# Patient Record
Sex: FEMALE | Race: BLACK OR AFRICAN AMERICAN | NOT HISPANIC OR LATINO | Employment: OTHER | URBAN - METROPOLITAN AREA
[De-identification: names, ages, dates, MRNs, and addresses within clinical notes are randomized per-mention and may not be internally consistent; named-entity substitution may affect disease eponyms.]

---

## 2019-04-10 ENCOUNTER — HOSPITAL ENCOUNTER (INPATIENT)
Facility: HOSPITAL | Age: 74
LOS: 6 days | DRG: 464 | End: 2019-04-16
Attending: EMERGENCY MEDICINE | Admitting: EMERGENCY MEDICINE
Payer: MEDICARE

## 2019-04-10 DIAGNOSIS — S91.302A OPEN WOUND OF LEFT HEEL, INITIAL ENCOUNTER: ICD-10-CM

## 2019-04-10 DIAGNOSIS — S91.309A WOUND OF FOOT: ICD-10-CM

## 2019-04-10 DIAGNOSIS — M86.072 ACUTE HEMATOGENOUS OSTEOMYELITIS OF LEFT FOOT: ICD-10-CM

## 2019-04-10 DIAGNOSIS — R09.89 DECREASED PEDAL PULSES: ICD-10-CM

## 2019-04-10 DIAGNOSIS — L89.90 PRESSURE INJURY OF SKIN, UNSPECIFIED INJURY STAGE, UNSPECIFIED LOCATION: Primary | ICD-10-CM

## 2019-04-10 LAB
ALBUMIN SERPL BCP-MCNC: 2.9 G/DL (ref 3.5–5.2)
ALP SERPL-CCNC: 76 U/L (ref 55–135)
ALT SERPL W/O P-5'-P-CCNC: 21 U/L (ref 10–44)
ANION GAP SERPL CALC-SCNC: 8 MMOL/L (ref 8–16)
AST SERPL-CCNC: 16 U/L (ref 10–40)
BASOPHILS # BLD AUTO: 0.03 K/UL (ref 0–0.2)
BASOPHILS NFR BLD: 0.3 % (ref 0–1.9)
BILIRUB SERPL-MCNC: 0.2 MG/DL (ref 0.1–1)
BUN SERPL-MCNC: 29 MG/DL (ref 8–23)
CALCIUM SERPL-MCNC: 10.8 MG/DL (ref 8.7–10.5)
CHLORIDE SERPL-SCNC: 112 MMOL/L (ref 95–110)
CO2 SERPL-SCNC: 27 MMOL/L (ref 23–29)
CREAT SERPL-MCNC: 1.7 MG/DL (ref 0.5–1.4)
CRP SERPL-MCNC: 68.1 MG/L (ref 0–8.2)
DIFFERENTIAL METHOD: ABNORMAL
EOSINOPHIL # BLD AUTO: 0.2 K/UL (ref 0–0.5)
EOSINOPHIL NFR BLD: 2.5 % (ref 0–8)
ERYTHROCYTE [DISTWIDTH] IN BLOOD BY AUTOMATED COUNT: 15 % (ref 11.5–14.5)
ERYTHROCYTE [SEDIMENTATION RATE] IN BLOOD BY WESTERGREN METHOD: 120 MM/HR (ref 0–20)
EST. GFR  (AFRICAN AMERICAN): 34 ML/MIN/1.73 M^2
EST. GFR  (NON AFRICAN AMERICAN): 29 ML/MIN/1.73 M^2
GLUCOSE SERPL-MCNC: 98 MG/DL (ref 70–110)
HCT VFR BLD AUTO: 36.6 % (ref 37–48.5)
HGB BLD-MCNC: 10.9 G/DL (ref 12–16)
LYMPHOCYTES # BLD AUTO: 1.8 K/UL (ref 1–4.8)
LYMPHOCYTES NFR BLD: 19.7 % (ref 18–48)
MCH RBC QN AUTO: 26.1 PG (ref 27–31)
MCHC RBC AUTO-ENTMCNC: 29.8 G/DL (ref 32–36)
MCV RBC AUTO: 88 FL (ref 82–98)
MONOCYTES # BLD AUTO: 0.7 K/UL (ref 0.3–1)
MONOCYTES NFR BLD: 7.5 % (ref 4–15)
NEUTROPHILS # BLD AUTO: 6.5 K/UL (ref 1.8–7.7)
NEUTROPHILS NFR BLD: 70 % (ref 38–73)
PLATELET # BLD AUTO: 387 K/UL (ref 150–350)
PMV BLD AUTO: 8.6 FL (ref 9.2–12.9)
POTASSIUM SERPL-SCNC: 4.5 MMOL/L (ref 3.5–5.1)
PROT SERPL-MCNC: 8.4 G/DL (ref 6–8.4)
RBC # BLD AUTO: 4.18 M/UL (ref 4–5.4)
SODIUM SERPL-SCNC: 147 MMOL/L (ref 136–145)
WBC # BLD AUTO: 9.29 K/UL (ref 3.9–12.7)

## 2019-04-10 PROCEDURE — 87077 CULTURE AEROBIC IDENTIFY: CPT

## 2019-04-10 PROCEDURE — 99285 EMERGENCY DEPT VISIT HI MDM: CPT

## 2019-04-10 PROCEDURE — 85652 RBC SED RATE AUTOMATED: CPT

## 2019-04-10 PROCEDURE — 87186 SC STD MICRODIL/AGAR DIL: CPT

## 2019-04-10 PROCEDURE — 80053 COMPREHEN METABOLIC PANEL: CPT

## 2019-04-10 PROCEDURE — 87070 CULTURE OTHR SPECIMN AEROBIC: CPT

## 2019-04-10 PROCEDURE — 11000001 HC ACUTE MED/SURG PRIVATE ROOM

## 2019-04-10 PROCEDURE — 86140 C-REACTIVE PROTEIN: CPT

## 2019-04-10 PROCEDURE — 85025 COMPLETE CBC W/AUTO DIFF WBC: CPT

## 2019-04-10 RX ORDER — DONEPEZIL HYDROCHLORIDE 10 MG/1
10 TABLET, FILM COATED ORAL NIGHTLY
Status: DISCONTINUED | OUTPATIENT
Start: 2019-04-10 | End: 2019-04-16 | Stop reason: HOSPADM

## 2019-04-10 RX ORDER — FLUTICASONE PROPIONATE 50 MCG
1 SPRAY, SUSPENSION (ML) NASAL DAILY
Status: DISCONTINUED | OUTPATIENT
Start: 2019-04-11 | End: 2019-04-11

## 2019-04-10 RX ORDER — CARVEDILOL 6.25 MG/1
6.25 TABLET ORAL 2 TIMES DAILY WITH MEALS
Status: DISCONTINUED | OUTPATIENT
Start: 2019-04-11 | End: 2019-04-16 | Stop reason: HOSPADM

## 2019-04-10 RX ORDER — SODIUM CHLORIDE 0.9 % (FLUSH) 0.9 %
10 SYRINGE (ML) INJECTION
Status: DISCONTINUED | OUTPATIENT
Start: 2019-04-10 | End: 2019-04-11

## 2019-04-10 RX ORDER — ZINC SULFATE 50(220)MG
220 CAPSULE ORAL DAILY
COMMUNITY

## 2019-04-10 RX ORDER — CHOLECALCIFEROL (VITAMIN D3) 25 MCG
1000 TABLET ORAL DAILY
COMMUNITY

## 2019-04-10 RX ORDER — ARIPIPRAZOLE 5 MG/1
5 TABLET ORAL DAILY
Status: DISCONTINUED | OUTPATIENT
Start: 2019-04-11 | End: 2019-04-16 | Stop reason: HOSPADM

## 2019-04-10 RX ORDER — ACETAMINOPHEN 325 MG/1
650 TABLET ORAL EVERY 8 HOURS PRN
Status: DISCONTINUED | OUTPATIENT
Start: 2019-04-10 | End: 2019-04-11

## 2019-04-10 RX ORDER — FLUTICASONE PROPIONATE 50 MCG
1 SPRAY, SUSPENSION (ML) NASAL DAILY
COMMUNITY

## 2019-04-10 RX ORDER — ONDANSETRON 2 MG/ML
4 INJECTION INTRAMUSCULAR; INTRAVENOUS EVERY 8 HOURS PRN
Status: DISCONTINUED | OUTPATIENT
Start: 2019-04-10 | End: 2019-04-11

## 2019-04-10 RX ORDER — DONEPEZIL HYDROCHLORIDE 10 MG/1
10 TABLET, FILM COATED ORAL NIGHTLY
COMMUNITY

## 2019-04-10 RX ORDER — TRAZODONE HYDROCHLORIDE 50 MG/1
25 TABLET ORAL NIGHTLY
COMMUNITY

## 2019-04-10 RX ORDER — CARVEDILOL 6.25 MG/1
6.25 TABLET ORAL 2 TIMES DAILY WITH MEALS
COMMUNITY

## 2019-04-10 RX ORDER — ARIPIPRAZOLE 15 MG/1
15 TABLET ORAL NIGHTLY
COMMUNITY

## 2019-04-10 RX ORDER — ESCITALOPRAM OXALATE 10 MG/1
15 TABLET ORAL DAILY
COMMUNITY

## 2019-04-10 NOTE — ED PROVIDER NOTES
Encounter Date: 4/10/2019    SCRIBE #1 NOTE: I, Emeka Bajwa, am scribing for, and in the presence of,  Anu Dunn MD. I have scribed the following portions of the note - Other sections scribed: HPI, ROS.       History     Chief Complaint   Patient presents with    Wound Infection     From [place] with wound to left heel. Per EMS, nursing home wants to rule out osteo.      CC: Wound Infection    HPI: This 74 y.o. Female with history of cognitive disturbance, gait disturbance, GERD, CKD, dementia presents to the ED from [place] for an evaluation of a L heel wound. Her nursing home wanted her to come to the ED to rule out osteomyelitis.  Patient denies any complaints but she is a poor historian.  Pt states her wound is not painful anymore.  History obtained primarily by reviewing the nursing home medical records that were sent with the patient.      The history is provided by the patient. No  was used.     Review of patient's allergies indicates:  No Known Allergies  Past Medical History:   Diagnosis Date    Hypertension      History reviewed. No pertinent surgical history.  History reviewed. No pertinent family history.  Social History     Tobacco Use    Smoking status: Never Smoker   Substance Use Topics    Alcohol use: Not Currently    Drug use: Not on file     Review of Systems   Constitutional: Negative for chills and fever.   Respiratory: Negative for shortness of breath.    Cardiovascular: Negative for chest pain.   Gastrointestinal: Negative for abdominal pain, nausea and vomiting.   Genitourinary: Negative for difficulty urinating.   Musculoskeletal: Negative for back pain and neck pain.   Skin: Positive for wound.   Neurological: Negative for headaches.   Psychiatric/Behavioral: The patient is not nervous/anxious.        Physical Exam     Initial Vitals [04/10/19 1620]   BP Pulse Resp Temp SpO2   (!) 162/89 72 18 98.8 °F (37.1 °C) 100 %      MAP       --         Physical  Exam    Constitutional: She appears well-developed and well-nourished. She is not diaphoretic. No distress.   HENT:   Mouth/Throat: Oropharynx is clear and moist.   Eyes: Pupils are equal, round, and reactive to light.   Neck: Neck supple.   Cardiovascular: Normal rate and regular rhythm.   Pulses:       Dorsalis pedis pulses are 2+ on the right side, and 2+ on the left side.   Pulmonary/Chest: Breath sounds normal.   Abdominal: Soft. Bowel sounds are normal. There is no tenderness.   Neurological: She is alert.   Patient moves all 4 extremities   Skin: Skin is warm.   Approximately 4-5 cm ulceration to the left posterior heel, the tissue within this ulceration appears necrotic and has a foul smell.  There is no surrounding crepitus or swelling. I do not palpate any bone within this tissue.  There is serous fluid draining from this wound.  On the right heel there appears to be a dark fluid filled blister, this area is nontender to palpation, there is no crepitus or surrounding erythema, there is no drainage from this wound.         ED Course   Procedures  Labs Reviewed   CBC W/ AUTO DIFFERENTIAL - Abnormal; Notable for the following components:       Result Value    Hemoglobin 10.9 (*)     Hematocrit 36.6 (*)     MCH 26.1 (*)     MCHC 29.8 (*)     RDW 15.0 (*)     Platelets 387 (*)     MPV 8.6 (*)     All other components within normal limits   COMPREHENSIVE METABOLIC PANEL - Abnormal; Notable for the following components:    Sodium 147 (*)     Chloride 112 (*)     BUN, Bld 29 (*)     Creatinine 1.7 (*)     Calcium 10.8 (*)     Albumin 2.9 (*)     eGFR if  34 (*)     eGFR if non  29 (*)     All other components within normal limits   SEDIMENTATION RATE - Abnormal; Notable for the following components:    Sed Rate 120 (*)     All other components within normal limits   C-REACTIVE PROTEIN - Abnormal; Notable for the following components:    CRP 68.1 (*)     All other components within  normal limits          Imaging Results          MRI Foot (Hindfoot) Left Without Contrast (Final result)  Result time 04/10/19 22:49:34    Final result by Keshav Venegas MD (04/10/19 22:49:34)                 Impression:      Osseous edema within the posterior calcaneus which may be secondary to reactive edema in this patient with posterior heel ulceration, although potential early developing osteomyelitis not excluded.      Electronically signed by: Keshav Venegas MD  Date:    04/10/2019  Time:    22:49             Narrative:    EXAMINATION:  MRI FOOT (HINDFOOT) LEFT WITHOUT CONTRAST    CLINICAL HISTORY:  Soft tissue disorders related to use, overuse and pressure;pressure ulcer L heel, rule out osteomyelitis;    TECHNIQUE:  Multiplanar, multisequence images were obtained of the left hindfoot without the use of IV contrast.    COMPARISON:  Left foot radiographs from the same date.    FINDINGS:  Osseous edema is seen involving the posterior aspect of the calcaneus in the region of posterior soft tissue ulceration.  Mild heterogenous T1 marrow signal is seen in this region.  T1 marrow signal is otherwise preserved throughout the remainder of the hindfoot.  No evidence of fracture.  Achilles tendon is intact.  No focal fluid collections seen.                               X-Ray Foot Complete Left (Final result)  Result time 04/10/19 17:33:42    Final result by Raymundo Peraza MD (04/10/19 17:33:42)                 Impression:      Suspected soft tissue ulceration about the heel without definite underlying acute displaced fracture-dislocation or destructive osseous process seen, noting that radiographic appearance of osteomyelitis can lag clinical presentation up to 2 weeks.  Further evaluation with elective/nonemergent bone scan can be obtained as warranted.      Electronically signed by: Raymundo Peraza MD  Date:    04/10/2019  Time:    17:33             Narrative:    EXAMINATION:  XR FOOT COMPLETE 3 VIEW  LEFT    CLINICAL HISTORY:  .  Unspecified open wound, unspecified foot, initial encounter    TECHNIQUE:  AP, lateral and oblique views of the left foot were performed.    COMPARISON:  None    FINDINGS:  Slight hallux valgus configuration.  Exaggerated flexion at the 2nd through 5th PIP joints with overlapping of osseous structures somewhat limits evaluation on all views.    Skin irregularity with localized soft tissue defect measuring at least 2.5 cm at the heel overlying the calcaneal tuberosity.  No associated subcutaneous gas foci or radiodense retained foreign body.  No subjacent osseous erosion or periosteal reaction.    No displaced fracture, dislocation or destructive osseous process.  Small plantar calcaneal spur.  Mild degenerative change at the 1st MTP joint and dorsal midfoot.                                 Medical Decision Making:   Initial Assessment:   74-year-old female presents from nursing home to rule out osteomyelitis on her left heel.  The wound appears to have necrotic tissue within it and has a foul smell and is draining serous fluid.  The patient does not appear toxic.  Differential includes pressure ulcer versus osteomyelitis, I do not suspect necrotizing fasciitis versus abscess based on my exam.  Workup with labs including inflammatory markers, x-ray.  ED Management:  No leukocytosis, inflammatory markers are elevated, x-ray is nondiagnostic for osteomyelitis.  MRI of her left hindfoot has been ordered, patient to be admitted for MRI and podiatry consult.  Discussed case with Dr. Salcedo.             Scribe Attestation:   Scribe #1: I performed the above scribed service and the documentation accurately describes the services I performed. I attest to the accuracy of the note.    Attending Attestation:           Physician Attestation for Scribe:  Physician Attestation Statement for Scribe #1: I, Anu Dunn MD, reviewed documentation, as scribed by Emeka Bajwa in my presence, and it is  both accurate and complete.                    Clinical Impression:       ICD-10-CM ICD-9-CM   1. Pressure injury of skin, unspecified injury stage, unspecified location L89.90 707.00     707.20   2. Wound of foot S91.309A 892.0                                Anu Dunn MD  04/11/19 6233

## 2019-04-10 NOTE — ED TRIAGE NOTES
Pt to the ED via EMS from [place] with c/o L heel infection. MD would like pt evaluated for infection to bone. No acute distress noted. Pt is awake alert and oriented. VSS.

## 2019-04-11 ENCOUNTER — TELEPHONE (OUTPATIENT)
Dept: PODIATRY | Facility: CLINIC | Age: 74
End: 2019-04-11

## 2019-04-11 PROBLEM — D63.8 ANEMIA OF CHRONIC DISEASE: Chronic | Status: ACTIVE | Noted: 2019-04-11

## 2019-04-11 PROBLEM — F03.918 DEMENTIA WITH BEHAVIORAL DISTURBANCE: Chronic | Status: ACTIVE | Noted: 2019-04-11

## 2019-04-11 PROBLEM — L97.423 CHRONIC HEEL ULCER, LEFT, WITH NECROSIS OF MUSCLE: Status: ACTIVE | Noted: 2019-04-11

## 2019-04-11 PROBLEM — M86.072 ACUTE HEMATOGENOUS OSTEOMYELITIS OF LEFT FOOT: Status: ACTIVE | Noted: 2019-04-11

## 2019-04-11 PROBLEM — I10 ESSENTIAL HYPERTENSION: Chronic | Status: ACTIVE | Noted: 2019-04-11

## 2019-04-11 PROBLEM — F32.A DEPRESSION: Chronic | Status: ACTIVE | Noted: 2019-04-11

## 2019-04-11 PROBLEM — S91.302A OPEN WOUND OF LEFT HEEL: Status: ACTIVE | Noted: 2019-04-10

## 2019-04-11 PROBLEM — N18.30 CKD (CHRONIC KIDNEY DISEASE) STAGE 3, GFR 30-59 ML/MIN: Chronic | Status: ACTIVE | Noted: 2019-04-11

## 2019-04-11 PROBLEM — L97.424: Status: ACTIVE | Noted: 2019-04-11

## 2019-04-11 PROBLEM — K21.9 GERD (GASTROESOPHAGEAL REFLUX DISEASE): Chronic | Status: ACTIVE | Noted: 2019-04-11

## 2019-04-11 LAB
ALBUMIN SERPL BCP-MCNC: 2.6 G/DL (ref 3.5–5.2)
ALP SERPL-CCNC: 66 U/L (ref 55–135)
ALT SERPL W/O P-5'-P-CCNC: 16 U/L (ref 10–44)
ANION GAP SERPL CALC-SCNC: 9 MMOL/L (ref 8–16)
AST SERPL-CCNC: 13 U/L (ref 10–40)
BASOPHILS # BLD AUTO: 0.03 K/UL (ref 0–0.2)
BASOPHILS NFR BLD: 0.3 % (ref 0–1.9)
BILIRUB SERPL-MCNC: 0.3 MG/DL (ref 0.1–1)
BUN SERPL-MCNC: 29 MG/DL (ref 8–23)
CALCIUM SERPL-MCNC: 10.4 MG/DL (ref 8.7–10.5)
CHLORIDE SERPL-SCNC: 113 MMOL/L (ref 95–110)
CO2 SERPL-SCNC: 24 MMOL/L (ref 23–29)
CREAT SERPL-MCNC: 1.6 MG/DL (ref 0.5–1.4)
DIFFERENTIAL METHOD: ABNORMAL
EOSINOPHIL # BLD AUTO: 0.2 K/UL (ref 0–0.5)
EOSINOPHIL NFR BLD: 1.6 % (ref 0–8)
ERYTHROCYTE [DISTWIDTH] IN BLOOD BY AUTOMATED COUNT: 14.8 % (ref 11.5–14.5)
EST. GFR  (AFRICAN AMERICAN): 36 ML/MIN/1.73 M^2
EST. GFR  (NON AFRICAN AMERICAN): 31 ML/MIN/1.73 M^2
GLUCOSE SERPL-MCNC: 127 MG/DL (ref 70–110)
HCT VFR BLD AUTO: 35.1 % (ref 37–48.5)
HGB BLD-MCNC: 10.4 G/DL (ref 12–16)
LYMPHOCYTES # BLD AUTO: 2.5 K/UL (ref 1–4.8)
LYMPHOCYTES NFR BLD: 21.2 % (ref 18–48)
MAGNESIUM SERPL-MCNC: 2.3 MG/DL (ref 1.6–2.6)
MCH RBC QN AUTO: 25.9 PG (ref 27–31)
MCHC RBC AUTO-ENTMCNC: 29.6 G/DL (ref 32–36)
MCV RBC AUTO: 88 FL (ref 82–98)
MONOCYTES # BLD AUTO: 1.1 K/UL (ref 0.3–1)
MONOCYTES NFR BLD: 9.3 % (ref 4–15)
NEUTROPHILS # BLD AUTO: 7.9 K/UL (ref 1.8–7.7)
NEUTROPHILS NFR BLD: 67.6 % (ref 38–73)
PHOSPHATE SERPL-MCNC: 3.8 MG/DL (ref 2.7–4.5)
PLATELET # BLD AUTO: 358 K/UL (ref 150–350)
PMV BLD AUTO: 8.8 FL (ref 9.2–12.9)
POTASSIUM SERPL-SCNC: 4.8 MMOL/L (ref 3.5–5.1)
PROT SERPL-MCNC: 7.4 G/DL (ref 6–8.4)
RBC # BLD AUTO: 4.01 M/UL (ref 4–5.4)
SODIUM SERPL-SCNC: 146 MMOL/L (ref 136–145)
WBC # BLD AUTO: 11.61 K/UL (ref 3.9–12.7)

## 2019-04-11 PROCEDURE — 11000001 HC ACUTE MED/SURG PRIVATE ROOM

## 2019-04-11 PROCEDURE — 36415 COLL VENOUS BLD VENIPUNCTURE: CPT

## 2019-04-11 PROCEDURE — 84100 ASSAY OF PHOSPHORUS: CPT

## 2019-04-11 PROCEDURE — 99223 PR INITIAL HOSPITAL CARE,LEVL III: ICD-10-PCS | Mod: ,,,

## 2019-04-11 PROCEDURE — 25000003 PHARM REV CODE 250: Performed by: INTERNAL MEDICINE

## 2019-04-11 PROCEDURE — 83735 ASSAY OF MAGNESIUM: CPT

## 2019-04-11 PROCEDURE — 99223 1ST HOSP IP/OBS HIGH 75: CPT | Mod: ,,,

## 2019-04-11 PROCEDURE — 63600175 PHARM REV CODE 636 W HCPCS: Performed by: INTERNAL MEDICINE

## 2019-04-11 PROCEDURE — 25000003 PHARM REV CODE 250: Performed by: EMERGENCY MEDICINE

## 2019-04-11 PROCEDURE — 85025 COMPLETE CBC W/AUTO DIFF WBC: CPT

## 2019-04-11 PROCEDURE — 80053 COMPREHEN METABOLIC PANEL: CPT

## 2019-04-11 PROCEDURE — 94761 N-INVAS EAR/PLS OXIMETRY MLT: CPT

## 2019-04-11 RX ORDER — VANCOMYCIN HCL IN 5 % DEXTROSE 1G/250ML
1000 PLASTIC BAG, INJECTION (ML) INTRAVENOUS
Status: DISCONTINUED | OUTPATIENT
Start: 2019-04-12 | End: 2019-04-15

## 2019-04-11 RX ORDER — ACETAMINOPHEN 500 MG
500 TABLET ORAL EVERY 6 HOURS PRN
Status: DISCONTINUED | OUTPATIENT
Start: 2019-04-11 | End: 2019-04-16 | Stop reason: HOSPADM

## 2019-04-11 RX ORDER — CLONIDINE HYDROCHLORIDE 0.1 MG/1
0.1 TABLET ORAL 3 TIMES DAILY PRN
Status: DISCONTINUED | OUTPATIENT
Start: 2019-04-11 | End: 2019-04-16 | Stop reason: HOSPADM

## 2019-04-11 RX ORDER — FAMOTIDINE 20 MG/1
20 TABLET, FILM COATED ORAL DAILY
Status: DISCONTINUED | OUTPATIENT
Start: 2019-04-11 | End: 2019-04-16 | Stop reason: HOSPADM

## 2019-04-11 RX ORDER — RAMELTEON 8 MG/1
8 TABLET ORAL NIGHTLY PRN
Status: DISCONTINUED | OUTPATIENT
Start: 2019-04-11 | End: 2019-04-16 | Stop reason: HOSPADM

## 2019-04-11 RX ADMIN — DONEPEZIL HYDROCHLORIDE 10 MG: 10 TABLET, FILM COATED ORAL at 08:04

## 2019-04-11 RX ADMIN — ESCITALOPRAM OXALATE 15 MG: 5 TABLET, FILM COATED ORAL at 08:04

## 2019-04-11 RX ADMIN — DONEPEZIL HYDROCHLORIDE 10 MG: 10 TABLET, FILM COATED ORAL at 12:04

## 2019-04-11 RX ADMIN — PIPERACILLIN AND TAZOBACTAM 4.5 G: 4; .5 INJECTION, POWDER, LYOPHILIZED, FOR SOLUTION INTRAVENOUS; PARENTERAL at 04:04

## 2019-04-11 RX ADMIN — VANCOMYCIN HYDROCHLORIDE 1250 MG: 500 INJECTION, POWDER, LYOPHILIZED, FOR SOLUTION INTRAVENOUS at 02:04

## 2019-04-11 RX ADMIN — PIPERACILLIN AND TAZOBACTAM 4.5 G: 4; .5 INJECTION, POWDER, LYOPHILIZED, FOR SOLUTION INTRAVENOUS; PARENTERAL at 01:04

## 2019-04-11 RX ADMIN — PIPERACILLIN AND TAZOBACTAM 4.5 G: 4; .5 INJECTION, POWDER, LYOPHILIZED, FOR SOLUTION INTRAVENOUS; PARENTERAL at 08:04

## 2019-04-11 RX ADMIN — ARIPIPRAZOLE 5 MG: 5 TABLET ORAL at 09:04

## 2019-04-11 RX ADMIN — FAMOTIDINE 20 MG: 20 TABLET ORAL at 08:04

## 2019-04-11 NOTE — PLAN OF CARE
Problem: Adult Inpatient Plan of Care  Goal: Plan of Care Review  Outcome: Ongoing (interventions implemented as appropriate)     04/11/19 0302   Plan of Care Review   Plan of Care Reviewed With patient   Pt remains free of falls and injuries. With bilat heel wounds. Okay to leave L heel ANTHONY per Dr. Salcedo. Will consult podiatry and wound care. sundance boots applied. Pt turned as needed to prevent further skin breakdown. New hydrocolloid placed to sacral pressure injury. IV abx initiated. VSSAF. No other complaints at this time.

## 2019-04-11 NOTE — PLAN OF CARE
TN met with patient to introduce self and to explain duties of case management. Patient oriented to self only. TN contacted [facility] to complete patient's discharge needs assessment, TN spoke with Nurse Jt. Jt stated patient has been oriented mostly to self after her return from [facility] approximately 2 weeks ago. Jt also stated patient was transferred to [facility] due to refusal of mediations, hallucinations, and acts of paranoia. Patient returned from [facility] with a decline in her mental status and blisters to her heels per Jt. Jt stated patient is currently at total assist; however, before going to [facility] she ambulated with assistance. Patient will return to [facility] when medically stable for discharge.         04/11/19 8688   Discharge Assessment   Assessment Type Discharge Planning Assessment   Assessment information obtained from? Other  (nurse at [facility])   Prior to hospitilization cognitive status: Unable to Assess   Prior to hospitalization functional status: Wheelchair Bound;Needs Assistance   Current cognitive status: Not Oriented to Place;Not Oriented to Time  (ORIENTED to PERSON )   Current Functional Status:   (tbd)   Facility Arrived From:   ([facility] )   Lives With facility resident   Able to Return to Prior Arrangements yes   Is patient able to care for self after discharge? No   Who are your caregiver(s) and their phone number(s)? Adela Thomas- daughter [phone], [phone]   Patient's perception of discharge disposition nursing home   Readmission Within the Last 30 Days no previous admission in last 30 days   Patient currently being followed by outpatient case management? No   Patient currently receives any other outside agency services? No   Equipment Currently Used at Home wheelchair   Do you have any problems affording any of your prescribed medications? No   Is the patient taking medications as prescribed? yes   Does the  patient have transportation home? Yes   Transportation Anticipated agency   Does the patient receive services at the [facility]? No   Discharge Plan A Return to nursing home   Discharge Plan B Return to Nursing Home   Patient/Family in Agreement with Plan yes

## 2019-04-11 NOTE — ASSESSMENT & PLAN NOTE
On review of outside records, her baseline creatinine is around 1.4-1.6.  Her creatinine today is 1.7 which is near her baseline.  Will continue to monitor urine output.

## 2019-04-11 NOTE — HPI
Mrs. Keira Starkey is a 74 y.o. female [facility] resident with essential hypertension, CKD stage 3, anemia chronic disease, GERD, dementia, and depression who presents to [facility]- ED with complaints of left heel infection.  NH staff was concerned about the possibility of osteomyelitis and would like her to be evaluated.  Further history is otherwise limited at this time.

## 2019-04-11 NOTE — ASSESSMENT & PLAN NOTE
"She was sent from the NH for evaluation of her left heel wound due to concerns for osteomyelitis.  It is necrotic and odiferous.  Plain radiograph of the left foot was significant for "...soft tissue ulceration about the heel without definite underlying acute displaced fracture-dislocation or destructive osseous process seen...".  An MRI of the left foot was equivocal: Osseous edema within the posterior calcaneus which may be secondary to reactive edema in this patient with posterior heel ulceration, although potential early developing osteomyelitis not excluded.  She does not meet criteria for sepsis and has been started on empiric antibiotic therapy.  Will consult Podiatry for further recommendations.  "

## 2019-04-11 NOTE — H&P
[facility] Hospital Medicine  History & Physical    Patient Name: Keira Starkey  MRN: [id number]  Admission Date: 4/10/2019  Attending Physician: Laura Abrams MD   Primary Care Provider: Primary Doctor No         Patient information was obtained from ER records.     Subjective:     Principal Problem:Open wound of left heel    Chief Complaint: Left heel wound.    HPI: Mrs. Keira Starkey is a 74 y.o. female [facility] resident with essential hypertension, CKD stage 3, anemia chronic disease, GERD, dementia, and depression who presents to [facility] ED with complaints of left heel infection.  NH staff was concerned about the possibility of osteomyelitis and would like her to be evaluated.  Further history is otherwise limited at this time.    Chart Review:  Patient has not had any recent hospitalizations or outpatient clinic visits within the system.    Past Medical History:   Diagnosis Date    Hypertension        History reviewed. No pertinent surgical history.    Review of patient's allergies indicates:  No Known Allergies    No current facility-administered medications on file prior to encounter.      Current Outpatient Medications on File Prior to Encounter   Medication Sig    ARIPiprazole (ABILIFY) 15 MG Tab Take 15 mg by mouth nightly.     ascorbic acid, vitamin C, (VITAMIN C) 100 MG tablet Take 500 mg by mouth once daily.     carvedilol (COREG) 6.25 MG tablet Take 6.25 mg by mouth 2 (two) times daily with meals.    donepezil (ARICEPT) 10 MG tablet Take 10 mg by mouth every evening.    escitalopram oxalate (LEXAPRO) 10 MG tablet Take 15 mg by mouth once daily.    fluticasone (FLONASE) 50 mcg/actuation nasal spray 1 spray by Each Nare route once daily.    multivitamin capsule Take 1 capsule by mouth once daily.    ranitidine (ZANTAC) 150 MG tablet Take 150 mg by mouth once daily.     traZODone (DESYREL) 50 MG tablet Take 25 mg by mouth every evening.    vitamin D (VITAMIN D3)  1000 units Tab Take 1,000 Units by mouth once daily.    zinc sulfate (ZINCATE) 220 (50) mg capsule Take 220 mg by mouth once daily.     Family History     None        Tobacco Use    Smoking status: Never Smoker   Substance and Sexual Activity    Alcohol use: Not Currently    Drug use: Not on file    Sexual activity: Not on file     Review of Systems   Unable to perform ROS: Dementia     Objective:     Vital Signs (Most Recent):  Temp: 98.5 °F (36.9 °C) (04/10/19 2308)  Pulse: 64 (04/10/19 2308)  Resp: 18 (04/10/19 2308)  BP: (!) 153/66 (04/10/19 2308)  SpO2: 95 % (04/10/19 2308) Vital Signs (24h Range):  Temp:  [98.5 °F (36.9 °C)-98.8 °F (37.1 °C)] 98.5 °F (36.9 °C)  Pulse:  [64-72] 64  Resp:  [18] 18  SpO2:  [95 %-100 %] 95 %  BP: (133-162)/(66-89) 153/66     Weight: 75.1 kg (165 lb 9.6 oz)  Body mass index is 26.73 kg/m².    Physical Exam   Constitutional: She appears well-developed and well-nourished. No distress.   HENT:   Head: Normocephalic and atraumatic.   Right Ear: External ear normal.   Left Ear: External ear normal.   Nose: Nose normal.   Eyes: Right eye exhibits no discharge. Left eye exhibits no discharge.   Neck: Normal range of motion.   Cardiovascular: Normal rate, regular rhythm, normal heart sounds and intact distal pulses. Exam reveals no gallop and no friction rub.   No murmur heard.  Pulmonary/Chest: Effort normal and breath sounds normal. No stridor. No respiratory distress. She has no wheezes. She has no rales. She exhibits no tenderness.   Abdominal:   Mildly distended, nontender to palpation, bowel sounds are hypoactive   Musculoskeletal: Normal range of motion. She exhibits no edema.   Neurological:   Awake, alert, cooperative, pleasant, but oriented only to person.   Skin: She is not diaphoretic.   There is a large odiferous  wound to the left heel with a necrotic center with minimal drainage.     Psychiatric: She has a normal mood and affect. Her behavior is normal. Judgment and  "thought content normal.   Nursing note and vitals reviewed.          Significant Labs: All pertinent labs within the past 24 hours have been reviewed.    Significant Imaging: I have reviewed and interpreted all pertinent imaging results/findings within the past 24 hours.    Assessment/Plan:     * Open wound of left heel  She was sent from the NH for evaluation of her left heel wound due to concerns for osteomyelitis.  It is necrotic and odiferous.  Plain radiograph of the left foot was significant for "...soft tissue ulceration about the heel without definite underlying acute displaced fracture-dislocation or destructive osseous process seen...".  An MRI of the left foot was equivocal: Osseous edema within the posterior calcaneus which may be secondary to reactive edema in this patient with posterior heel ulceration, although potential early developing osteomyelitis not excluded.  She does not meet criteria for sepsis and has been started on empiric antibiotic therapy.  Will consult Podiatry for further recommendations.    Essential hypertension  Patient's blood pressure is fairly-controlled; will continue home regimen of carvedilol and provide as-needed clonidine.    CKD stage 3  On review of outside records, her baseline creatinine is around 1.4-1.6.  Her creatinine today is 1.7 which is near her baseline.  Will continue to monitor urine output.    Anemia of chronic disease  The patient's H/H is stable and consistent with previous laboratory measurements, and the patient exhibits no signs or symptoms of acute bleeding; there is no indication for transfusion.  Will continue to monitor.    GERD (gastroesophageal reflux disease)  Will substitute her home regimen of ranitidine for famotidine while inpatient.    Dementia with behavioral disturbance  Stable; will continue home regimen of donepezil.    Depression  Stable; will continue her home regimen of escitalopram.    VTE Risk Mitigation (From admission, onward)      "   Ordered     IP VTE HIGH RISK PATIENT  Once      04/11/19 0056     Place PAUL hose  Until discontinued      04/11/19 0056     Place sequential compression device  Until discontinued      04/11/19 0056           hCristopher Salcedo M.D.  Staff Nocturnist  Department of Hospital Medicine  [facility]  Pager: [phone]          N.B.: Portions of this note was dictated using M*Modal Fluency Direct--there may be phonetic errors occasionally missed on review.

## 2019-04-11 NOTE — CONSULTS
A 74 year old female admitted 4/10/19 from [facility] with open wound of left heel; essential hypertension; CKD Stage 3; anemia of chronic disease; GERD; dementia; depression  4/11 WBC 11.61 Hgb 10.4 Hct 35.1 Alb 2.6  Consult to Podiatry for left heel pressure injury; assess for osteomyelitis  Will assist nursing as needed with wound management.

## 2019-04-11 NOTE — CONSULTS
[facility] [place]  [place]iatry  Consult Note    Patient Name: Keira Starkey  MRN: [id number]  Admission Date: 4/10/2019  Hospital Length of Stay: 1 days  Attending Physician: Mimi Pace MD  Primary Care Provider: Primary Doctor No     Inpatient consult to Podiatry  Consult performed by: Warner Kramer DPM  Consult ordered by: Mimi Pace MD  Reason for consult: Left heel ulcer        Subjective:     History of Present Illness: Mrs. Keira Starkey is a 74 y.o. female [facility] resident with essential hypertension, CKD stage 3, anemia chronic disease, GERD, dementia, and depression who was admitted for further evaluation of her left heel wound.  NH staff was concerned about the possibility of osteomyelitis and would like her to be evaluated.  She is a limited historian but is alert to time place name.  No further history obtained.        Scheduled Meds:   ARIPiprazole  5 mg Oral Daily    carvedilol  6.25 mg Oral BID WM    donepezil  10 mg Oral QHS    escitalopram oxalate  15 mg Oral Daily    famotidine  20 mg Oral Daily    piperacillin-tazobactam (ZOSYN) IVPB  4.5 g Intravenous Q8H    [START ON 4/12/2019] vancomycin (VANCOCIN) IVPB  1,000 mg Intravenous Q24H     Continuous Infusions:  PRN Meds:acetaminophen, cloNIDine, promethazine (PHENERGAN) IVPB, ramelteon    Review of patient's allergies indicates:  No Known Allergies     Past Medical History:   Diagnosis Date    Hypertension      History reviewed. No pertinent surgical history.    Family History     None        Tobacco Use    Smoking status: Never Smoker   Substance and Sexual Activity    Alcohol use: Not Currently    Drug use: Not on file    Sexual activity: Not on file     Review of Systems   Unable to perform ROS: Dementia         Objective:     Vital Signs (Most Recent):  Temp: 98.4 °F (36.9 °C) (04/11/19 1542)  Pulse: (Abnormal) 59 (04/11/19 1542)  Resp: 16 (04/11/19 1542)  BP: 138/63 (04/11/19 1542)  SpO2: 100  % (04/11/19 1542) Vital Signs (24h Range):  Temp:  [97.7 °F (36.5 °C)-98.6 °F (37 °C)] 98.4 °F (36.9 °C)  Pulse:  [51-66] 59  Resp:  [16-18] 16  SpO2:  [95 %-100 %] 100 %  BP: (114-153)/(52-82) 138/63     Weight: 75.1 kg (165 lb 9.6 oz)  Body mass index is 26.73 kg/m².    Foot Exam     Constitutional:  Patient is oriented to person, place, and time.  Appears well-developed and well-nourished.     Vascular:  Dorsalis pedis pulses are 1+ on the right side, and 1+ on the left side.   Posterior tibial pulses are 1+ on the right side, and 1+ on the left side.   - digital hair growth, capillary fill time to all toes = seconds, no swelling, feet and toes warm to the touch    Skin/Dermatological:  Skin is warm and intact.  No cyanosis or clubbing.    Ulcer located left heel, measuring 12 x 7 x 0.2  cm to the subcutaneous  level, necrotic base, - overlying/periwound keratosis,  + mild edema, -erythema, -drainage, - fluctuance, + malodor            Musculoskeletal:      Restricted range of motion of midtarsal, subtalar joints.   No restriction of ankle joint dorsiflexion or plantarflexion.         Neurological:  No deficits to sharp/dull, light touch or vibratory sensation.   Muscle strength to tibialis anterior, extensor hallucis longus, extensor digitorum longus, peroneal muscles, flexor hallucis/digotorum longus, posterior tibial and gastrosoleal complex is 4/5, normal tone without assymmetry       Laboratory:  A1C: No results for input(s): HGBA1C in the last 4320 hours.  ABGs: No results for input(s): PH, PCO2, HCO3, POCSATURATED, BE in the last 168 hours.  Blood Cultures: No results for input(s): LABBLOO in the last 48 hours.  BMP:   Recent Labs   Lab 04/11/19  0442   *   *   K 4.8   *   CO2 24   BUN 29*   CREATININE 1.6*   CALCIUM 10.4   MG 2.3     Cardiac Markers: No results for input(s): CKMB, TROPONINT, MYOGLOBIN in the last 168 hours.  CBC:   Recent Labs   Lab 04/11/19  0442   WBC 11.61   RBC 4.01    HGB 10.4*   HCT 35.1*   *   MCV 88   MCH 25.9*   MCHC 29.6*     CMP:   Recent Labs   Lab 04/11/19  0442   *   CALCIUM 10.4   ALBUMIN 2.6*   PROT 7.4   *   K 4.8   CO2 24   *   BUN 29*   CREATININE 1.6*   ALKPHOS 66   ALT 16   AST 13   BILITOT 0.3     Coagulation: No results for input(s): PT, INR, APTT in the last 168 hours.  CPS: {:LNK,LABRCNTIP[  CRP:   Recent Labs   Lab 04/10/19  1728   CRP 68.1*     ESR:   Recent Labs   Lab 04/10/19  1728   SEDRATE 120*     LFTs:   Recent Labs   Lab 04/11/19  0442   ALT 16   AST 13   ALKPHOS 66   BILITOT 0.3   PROT 7.4   ALBUMIN 2.6*     Prealbumin: No results for input(s): PREALBUMIN in the last 48 hours.  Wound Cultures:   Recent Labs   Lab 04/10/19  1715   LABAERO GRAM NEGATIVE CRISSY, NON-LACTOSE   Many  Identification and susceptibility pending       Microbiology Results (last 7 days)     Procedure Component Value Units Date/Time    Aerobic culture (Specify Source) **CANNOT BE ORDERED AS STAT** [[id number]] Collected:  04/10/19 1715    Order Status:  Completed Specimen:  Wound from Foot, Left Updated:  04/11/19 1155     Aerobic Bacterial Culture --     GRAM NEGATIVE CRISSY, NON-LACTOSE   Many  Identification and susceptibility pending      Aerobic culture [[id number]]     Order Status:  No result Specimen:  Wound from Foot, Left         Specimen (12h ago, onward)    None        No results for input(s): COLORU, CLARITYU, SPECGRAV, PHUR, PROTEINUA, GLUCOSEU, BILIRUBINCON, BLOODU, WBCU, RBCU, BACTERIA, MUCUS, NITRITE, LEUKOCYTESUR, UROBILINOGEN, HYALINECASTS in the last 168 hours.  All pertinent labs reviewed within the last 24 hours.    Diagnostic Results:  MRI left foot 4/10/19: I have reviewed all pertinent results/findings within the past 24 hours.  Per radiology report: Osseous edema within the posterior calcaneus which may be secondary to reactive edema in this patient with posterior heel ulceration, although potential early developing  osteomyelitis not excluded.     X-ray left foot 4/10/19 per report Suspected soft tissue ulceration about the heel without definite underlying acute displaced fracture-dislocation or destructive osseous process seen, noting that radiographic appearance of osteomyelitis can lag clinical presentation up to 2 weeks.  Further evaluation with elective/nonemergent bone scan can be obtained as warranted.      Assessment/Plan:     Active Diagnoses:    Diagnosis Date Noted POA    PRINCIPAL PROBLEM:  Open wound of left heel [S91.302A] 04/10/2019 Yes    Essential hypertension [I10] 04/11/2019 Yes     Chronic    CKD stage 3 [N18.3] 04/11/2019 Yes     Chronic    Anemia of chronic disease [D63.8] 04/11/2019 Yes     Chronic    GERD (gastroesophageal reflux disease) [K21.9] 04/11/2019 Yes     Chronic    Dementia with behavioral disturbance [F03.91] 04/11/2019 Yes     Chronic    Depression [F32.9] 04/11/2019 Yes     Chronic    Chronic heel ulcer, left, with necrosis of muscle [L97.423] 04/11/2019 Unknown    Acute hematogenous osteomyelitis of left foot [M86.072] 04/11/2019 Unknown      Problems Resolved During this Admission:     Discussion with patient who verbalized understanding of the clinical findings and plan.  MRI suspicious for left calcaneal osteomyeelitis and will plan for debridement and bone biopsy tomorrow at noon should the arterial studies reflect adequate flow. For now offload both heels with heel protectors.  No weight left heel.  Painted heel with betadine, dressed with acqauacel,4x4, kerlix and tape. NPO for now.     Thank you for your consult. I will follow-up with patient. Please contact us if you have any additional questions.    Warner Singh DPM  Podiatry  [facility]

## 2019-04-11 NOTE — TELEPHONE ENCOUNTER
----- Message from Leatha Leonardo sent at 4/11/2019  9:14 AM CDT -----  Contact: Deanna - [facility]   Needs Advice    Reason for call: Need Hospital Consult - Osteo of the Left Foot Ulcer - RM 409B        Communication Preference: [phone]    Additional Information: If not available please contact Vignesh

## 2019-04-11 NOTE — PROGRESS NOTES
MRI attempted. Patient unable to answer safety questionnaire. Patient returned to ER. Patient's H & P from nursing home does not provide sufficient information to clear patient. Patient has no recent imaging in chart to clear patient. No emergency contact listed in patient's chart to contact. Patient's nurse, Jenny, was given an MRI screening form and the MRI clearance policy to properly clear patient for MRI. MRI on hold at 21:00pm until patient can be properly cleared for MRI.

## 2019-04-11 NOTE — MEDICAL/APP STUDENT
[facility]  History & Physical    Subjective:      Chief Complaint/Reason for Admission: Ulcer to heel    Keira Starkey is a 74 y.o. female who was brought to the ED for evaluation of a wound to her L heel. Patient is confused at baseline with dementia and is a poor historian.    Per nursing and medical chart review, patient did not complain of pain to her heel.    Past Medical History:   Diagnosis Date    Hypertension      History reviewed. No pertinent surgical history.  History reviewed. No pertinent family history.  Social History     Tobacco Use    Smoking status: Never Smoker   Substance Use Topics    Alcohol use: Not Currently    Drug use: Not on file       PTA Medications   Medication Sig    ARIPiprazole (ABILIFY) 15 MG Tab Take 15 mg by mouth nightly.     ascorbic acid, vitamin C, (VITAMIN C) 100 MG tablet Take 500 mg by mouth once daily.     carvedilol (COREG) 6.25 MG tablet Take 6.25 mg by mouth 2 (two) times daily with meals.    donepezil (ARICEPT) 10 MG tablet Take 10 mg by mouth every evening.    escitalopram oxalate (LEXAPRO) 10 MG tablet Take 15 mg by mouth once daily.    fluticasone (FLONASE) 50 mcg/actuation nasal spray 1 spray by Each Nare route once daily.    multivitamin capsule Take 1 capsule by mouth once daily.    ranitidine (ZANTAC) 150 MG tablet Take 150 mg by mouth once daily.     traZODone (DESYREL) 50 MG tablet Take 25 mg by mouth every evening.    vitamin D (VITAMIN D3) 1000 units Tab Take 1,000 Units by mouth once daily.    zinc sulfate (ZINCATE) 220 (50) mg capsule Take 220 mg by mouth once daily.     Review of patient's allergies indicates:  No Known Allergies     Review of Systems   Unable to perform ROS: Dementia       Objective:      Vital Signs (Most Recent)  Temp: 98.5 °F (36.9 °C) (04/10/19 2308)  Pulse: 64 (04/10/19 2308)  Resp: 18 (04/10/19 2308)  BP: (!) 153/66 (04/10/19 2308)  SpO2: 95 % (04/10/19 2308)    Vital Signs Range (Last  24H):  Temp:  [98.5 °F (36.9 °C)-98.8 °F (37.1 °C)]   Pulse:  [64-72]   Resp:  [18]   BP: (133-162)/(66-89)   SpO2:  [95 %-100 %]     Physical Exam   Constitutional: She is oriented to person, place, and time. She appears well-developed and well-nourished.   HENT:   Head: Normocephalic and atraumatic.   Eyes: Pupils are equal, round, and reactive to light. EOM are normal.   Neck: Normal range of motion. Neck supple.   Cardiovascular: Normal rate, regular rhythm, normal heart sounds and intact distal pulses.   Pulmonary/Chest: Effort normal and breath sounds normal.   Abdominal: Soft. Bowel sounds are normal.   Musculoskeletal: She exhibits no edema.   Feet:   Right Foot:   Skin Integrity: Positive for blister (dark purple blister on posterior calcaneous).   Left Foot:   Skin Integrity: Positive for ulcer (4cm necrotic  ulcer with serosanguinous fluid and foul smell. No crepitus or swelling. Center of ulcer is deep but cannot visualise bone. No erythema travelling up leg. No hyperpigmentation around ulcer.).   Neurological: She is alert and oriented to person, place, and time.   Nursing note and vitals reviewed.      Data Review:    CBC:   Lab Results   Component Value Date    WBC 9.29 04/10/2019    RBC 4.18 04/10/2019    HGB 10.9 (L) 04/10/2019    HCT 36.6 (L) 04/10/2019     (H) 04/10/2019     Recent Labs   Lab 04/10/19  1728   *   K 4.5   *   CO2 27   BUN 29*   CREATININE 1.7*       ECG: no prior ECG.   Xray foot: Suspected soft tissue ulceration about the heel without definite underlying acute displaced fracture-dislocation or destructive osseous process seen, noting that radiographic appearance of osteomyelitis can lag clinical presentation up to 2 weeks.  Further evaluation with elective/nonemergent bone scan can be obtained as warranted.    MRI Hind[place]:Osseous edema within the posterior calcaneus which may be secondary to reactive edema in this patient with posterior heel ulceration,  although potential early developing osteomyelitis not excluded.  Assessment:      Active Hospital Problems    Diagnosis  POA    Pressure injury of skin [L89.90]  Yes      Resolved Hospital Problems   No resolved problems to display.       Plan:    73yo NH resident with dementia and cognitive decline with pressure ulcer to L calcaneus.    Ulcers can be pressure, vascular, or diabetic in nature. In a patient with no DM or major cardiovascular disease, most likely diagnosis is a pressure ulcer. The patient is a poor historian so unable to verify preexisting conditions.    Pressure ulcers have risk factors including immobility and poor nutritional status.  Patient has a Stage III-full thickness skin loss-   decubitus ulcer.  -Offload area-raise ankles with mattress and support boots  -Culture swab for antibiotic tailoring  -Xray to assess for bone involvement  -MRI considered to investigate potential osteomyelitis.  -Surgical consult is considered debridement necessary  -Podiatry consult  -Consider wound vac    *Hypertension  Currently well controlled. Continue home regimen of carvedilol 6.25,g    *Dementia without behavior disturbances  Patient is alert and oriented to person and place but is a poor historian with no complaints. Currently on donepezil 10mg, escitalopram 15mg, and aripiprazole 5mg.   -Continue home medications    Dispo: Keep inpatient, podiatry +/- surgery consult. Schedule wound care and tailor antibiotics on culture results. Will return to NH.

## 2019-04-11 NOTE — NURSING
Pt arrived to [facility] floor via transport. Pt AAOx2. Wounds to bilat heels noted. L heel with eschar noted, ANTHONY. R heel with dressing intact. Small pressure injury noted to sacrum. New hydrocolloid applied. New IV started to LFA. Denies any pain at this time.

## 2019-04-11 NOTE — ASSESSMENT & PLAN NOTE
Patient's blood pressure is fairly-controlled; will continue home regimen of carvedilol and provide as-needed clonidine.

## 2019-04-11 NOTE — PLAN OF CARE
Problem: Adult Inpatient Plan of Care  Goal: Plan of Care Review  Outcome: Ongoing (interventions implemented as appropriate)     04/11/19 7704   Plan of Care Review   Plan of Care Reviewed With patient   Progress improving     Patient oriented x 2 and confused intermittently.  IV abx infused as ordered.  Bilateral heels offloading w/ [employer] boots and drsgs CDI.  Frequent checks made. Will continue to monitor.

## 2019-04-11 NOTE — SUBJECTIVE & OBJECTIVE
Past Medical History:   Diagnosis Date    Hypertension        History reviewed. No pertinent surgical history.    Review of patient's allergies indicates:  No Known Allergies    No current facility-administered medications on file prior to encounter.      Current Outpatient Medications on File Prior to Encounter   Medication Sig    ARIPiprazole (ABILIFY) 15 MG Tab Take 15 mg by mouth nightly.     ascorbic acid, vitamin C, (VITAMIN C) 100 MG tablet Take 500 mg by mouth once daily.     carvedilol (COREG) 6.25 MG tablet Take 6.25 mg by mouth 2 (two) times daily with meals.    donepezil (ARICEPT) 10 MG tablet Take 10 mg by mouth every evening.    escitalopram oxalate (LEXAPRO) 10 MG tablet Take 15 mg by mouth once daily.    fluticasone (FLONASE) 50 mcg/actuation nasal spray 1 spray by Each Nare route once daily.    multivitamin capsule Take 1 capsule by mouth once daily.    ranitidine (ZANTAC) 150 MG tablet Take 150 mg by mouth once daily.     traZODone (DESYREL) 50 MG tablet Take 25 mg by mouth every evening.    vitamin D (VITAMIN D3) 1000 units Tab Take 1,000 Units by mouth once daily.    zinc sulfate (ZINCATE) 220 (50) mg capsule Take 220 mg by mouth once daily.     Family History     None        Tobacco Use    Smoking status: Never Smoker   Substance and Sexual Activity    Alcohol use: Not Currently    Drug use: Not on file    Sexual activity: Not on file     Review of Systems   Unable to perform ROS: Dementia     Objective:     Vital Signs (Most Recent):  Temp: 98.5 °F (36.9 °C) (04/10/19 2308)  Pulse: 64 (04/10/19 2308)  Resp: 18 (04/10/19 2308)  BP: (!) 153/66 (04/10/19 2308)  SpO2: 95 % (04/10/19 2308) Vital Signs (24h Range):  Temp:  [98.5 °F (36.9 °C)-98.8 °F (37.1 °C)] 98.5 °F (36.9 °C)  Pulse:  [64-72] 64  Resp:  [18] 18  SpO2:  [95 %-100 %] 95 %  BP: (133-162)/(66-89) 153/66     Weight: 75.1 kg (165 lb 9.6 oz)  Body mass index is 26.73 kg/m².    Physical Exam   Constitutional: She appears  well-developed and well-nourished. No distress.   HENT:   Head: Normocephalic and atraumatic.   Right Ear: External ear normal.   Left Ear: External ear normal.   Nose: Nose normal.   Eyes: Right eye exhibits no discharge. Left eye exhibits no discharge.   Neck: Normal range of motion.   Cardiovascular: Normal rate, regular rhythm, normal heart sounds and intact distal pulses. Exam reveals no gallop and no friction rub.   No murmur heard.  Pulmonary/Chest: Effort normal and breath sounds normal. No stridor. No respiratory distress. She has no wheezes. She has no rales. She exhibits no tenderness.   Abdominal:   Mildly distended, nontender to palpation, bowel sounds are hypoactive   Musculoskeletal: Normal range of motion. She exhibits no edema.   Neurological:   Awake, alert, cooperative, pleasant, but oriented only to person.   Skin: She is not diaphoretic.   There is a large odiferous  wound to the left heel with a necrotic center with minimal drainage.     Psychiatric: She has a normal mood and affect. Her behavior is normal. Judgment and thought content normal.   Nursing note and vitals reviewed.          Significant Labs: All pertinent labs within the past 24 hours have been reviewed.    Significant Imaging: I have reviewed and interpreted all pertinent imaging results/findings within the past 24 hours.

## 2019-04-12 ENCOUNTER — ANESTHESIA EVENT (OUTPATIENT)
Dept: SURGERY | Facility: HOSPITAL | Age: 74
DRG: 464 | End: 2019-04-12
Payer: MEDICARE

## 2019-04-12 ENCOUNTER — ANESTHESIA (OUTPATIENT)
Dept: SURGERY | Facility: HOSPITAL | Age: 74
DRG: 464 | End: 2019-04-12
Payer: MEDICARE

## 2019-04-12 PROBLEM — L97.424: Chronic | Status: ACTIVE | Noted: 2019-04-11

## 2019-04-12 LAB
ANION GAP SERPL CALC-SCNC: 6 MMOL/L (ref 8–16)
BACTERIA SPEC AEROBE CULT: NORMAL
BASOPHILS # BLD AUTO: 0.05 K/UL (ref 0–0.2)
BASOPHILS NFR BLD: 0.5 % (ref 0–1.9)
BUN SERPL-MCNC: 23 MG/DL (ref 8–23)
CALCIUM SERPL-MCNC: 10 MG/DL (ref 8.7–10.5)
CHLORIDE SERPL-SCNC: 111 MMOL/L (ref 95–110)
CO2 SERPL-SCNC: 26 MMOL/L (ref 23–29)
CREAT SERPL-MCNC: 1.7 MG/DL (ref 0.5–1.4)
DIFFERENTIAL METHOD: ABNORMAL
EOSINOPHIL # BLD AUTO: 0.4 K/UL (ref 0–0.5)
EOSINOPHIL NFR BLD: 3.4 % (ref 0–8)
ERYTHROCYTE [DISTWIDTH] IN BLOOD BY AUTOMATED COUNT: 14.7 % (ref 11.5–14.5)
EST. GFR  (AFRICAN AMERICAN): 34 ML/MIN/1.73 M^2
EST. GFR  (NON AFRICAN AMERICAN): 29 ML/MIN/1.73 M^2
GLUCOSE SERPL-MCNC: 87 MG/DL (ref 70–110)
HCT VFR BLD AUTO: 32.3 % (ref 37–48.5)
HGB BLD-MCNC: 9.6 G/DL (ref 12–16)
INR PPP: 1 (ref 0.8–1.2)
LYMPHOCYTES # BLD AUTO: 2.1 K/UL (ref 1–4.8)
LYMPHOCYTES NFR BLD: 20.6 % (ref 18–48)
MCH RBC QN AUTO: 25.8 PG (ref 27–31)
MCHC RBC AUTO-ENTMCNC: 29.7 G/DL (ref 32–36)
MCV RBC AUTO: 87 FL (ref 82–98)
MONOCYTES # BLD AUTO: 1 K/UL (ref 0.3–1)
MONOCYTES NFR BLD: 9.2 % (ref 4–15)
NEUTROPHILS # BLD AUTO: 6.8 K/UL (ref 1.8–7.7)
NEUTROPHILS NFR BLD: 66.3 % (ref 38–73)
PLATELET # BLD AUTO: 324 K/UL (ref 150–350)
PMV BLD AUTO: 8.6 FL (ref 9.2–12.9)
POTASSIUM SERPL-SCNC: 4.3 MMOL/L (ref 3.5–5.1)
PROTHROMBIN TIME: 10.7 SEC (ref 9–12.5)
RBC # BLD AUTO: 3.72 M/UL (ref 4–5.4)
SODIUM SERPL-SCNC: 143 MMOL/L (ref 136–145)
WBC # BLD AUTO: 10.32 K/UL (ref 3.9–12.7)

## 2019-04-12 PROCEDURE — 87015 SPECIMEN INFECT AGNT CONCNTJ: CPT

## 2019-04-12 PROCEDURE — 63600175 PHARM REV CODE 636 W HCPCS

## 2019-04-12 PROCEDURE — 97605 PR NEG PRESS WOUND THERAPY (NPWT) W/NON-DISPOSABLE WOUND VAC DEVICE (DME), <=50 CM: ICD-10-PCS | Mod: 51,,,

## 2019-04-12 PROCEDURE — 88307 TISSUE SPECIMEN TO PATHOLOGY - SURGERY: ICD-10-PCS | Mod: 26,,, | Performed by: PATHOLOGY

## 2019-04-12 PROCEDURE — 11047: ICD-10-PCS | Mod: ,,,

## 2019-04-12 PROCEDURE — 97605 NEG PRS WND THER DME<=50SQCM: CPT | Mod: 51,,,

## 2019-04-12 PROCEDURE — 63600175 PHARM REV CODE 636 W HCPCS: Performed by: INTERNAL MEDICINE

## 2019-04-12 PROCEDURE — 87102 FUNGUS ISOLATION CULTURE: CPT

## 2019-04-12 PROCEDURE — 11044 PR DEBRIDEMENT, SKIN, SUB-Q TISSUE,MUSCLE,BONE,=<20 SQ CM: ICD-10-PCS | Mod: ,,,

## 2019-04-12 PROCEDURE — 88307 TISSUE EXAM BY PATHOLOGIST: CPT | Performed by: PATHOLOGY

## 2019-04-12 PROCEDURE — 87075 CULTR BACTERIA EXCEPT BLOOD: CPT

## 2019-04-12 PROCEDURE — 25000003 PHARM REV CODE 250

## 2019-04-12 PROCEDURE — S0020 INJECTION, BUPIVICAINE HYDRO: HCPCS

## 2019-04-12 PROCEDURE — 25000003 PHARM REV CODE 250: Performed by: INTERNAL MEDICINE

## 2019-04-12 PROCEDURE — 87077 CULTURE AEROBIC IDENTIFY: CPT

## 2019-04-12 PROCEDURE — 11000001 HC ACUTE MED/SURG PRIVATE ROOM

## 2019-04-12 PROCEDURE — 85025 COMPLETE CBC W/AUTO DIFF WBC: CPT

## 2019-04-12 PROCEDURE — 36000707

## 2019-04-12 PROCEDURE — 88311 TISSUE SPECIMEN TO PATHOLOGY - SURGERY: ICD-10-PCS | Mod: 26,,, | Performed by: PATHOLOGY

## 2019-04-12 PROCEDURE — 88311 DECALCIFY TISSUE: CPT | Mod: 26,,, | Performed by: PATHOLOGY

## 2019-04-12 PROCEDURE — 80048 BASIC METABOLIC PNL TOTAL CA: CPT

## 2019-04-12 PROCEDURE — 88307 TISSUE EXAM BY PATHOLOGIST: CPT | Mod: 26,,, | Performed by: PATHOLOGY

## 2019-04-12 PROCEDURE — D9220A PRA ANESTHESIA: ICD-10-PCS | Mod: ANES,,, | Performed by: ANESTHESIOLOGY

## 2019-04-12 PROCEDURE — 87070 CULTURE OTHR SPECIMN AEROBIC: CPT | Mod: 59

## 2019-04-12 PROCEDURE — 87205 SMEAR GRAM STAIN: CPT | Mod: 59

## 2019-04-12 PROCEDURE — 36000706

## 2019-04-12 PROCEDURE — 20220 PR BONE BIOPSY,TROCAR/NEEDLE SUPERF: ICD-10-PCS | Mod: 51,,,

## 2019-04-12 PROCEDURE — 37000008 HC ANESTHESIA 1ST 15 MINUTES

## 2019-04-12 PROCEDURE — 25000003 PHARM REV CODE 250: Performed by: NURSE ANESTHETIST, CERTIFIED REGISTERED

## 2019-04-12 PROCEDURE — 20220 BONE BIOPSY TROCAR/NDL SUPFC: CPT | Mod: 51,,,

## 2019-04-12 PROCEDURE — 36415 COLL VENOUS BLD VENIPUNCTURE: CPT

## 2019-04-12 PROCEDURE — 11047 DBRDMT BONE EACH ADDL: CPT | Mod: ,,,

## 2019-04-12 PROCEDURE — 11044 DBRDMT BONE 1ST 20 SQ CM/<: CPT | Mod: ,,,

## 2019-04-12 PROCEDURE — 37000009 HC ANESTHESIA EA ADD 15 MINS

## 2019-04-12 PROCEDURE — 71000033 HC RECOVERY, INTIAL HOUR

## 2019-04-12 PROCEDURE — D9220A PRA ANESTHESIA: ICD-10-PCS | Mod: CRNA,,, | Performed by: NURSE ANESTHETIST, CERTIFIED REGISTERED

## 2019-04-12 PROCEDURE — 87206 SMEAR FLUORESCENT/ACID STAI: CPT | Mod: 91

## 2019-04-12 PROCEDURE — 87116 MYCOBACTERIA CULTURE: CPT | Mod: 59

## 2019-04-12 PROCEDURE — D9220A PRA ANESTHESIA: Mod: ANES,,, | Performed by: ANESTHESIOLOGY

## 2019-04-12 PROCEDURE — D9220A PRA ANESTHESIA: Mod: CRNA,,, | Performed by: NURSE ANESTHETIST, CERTIFIED REGISTERED

## 2019-04-12 PROCEDURE — 87076 CULTURE ANAEROBE IDENT EACH: CPT | Mod: 59

## 2019-04-12 PROCEDURE — 85610 PROTHROMBIN TIME: CPT

## 2019-04-12 PROCEDURE — 63600175 PHARM REV CODE 636 W HCPCS: Performed by: NURSE ANESTHETIST, CERTIFIED REGISTERED

## 2019-04-12 PROCEDURE — 87186 SC STD MICRODIL/AGAR DIL: CPT

## 2019-04-12 RX ORDER — LIDOCAINE HCL/PF 100 MG/5ML
SYRINGE (ML) INTRAVENOUS
Status: DISCONTINUED | OUTPATIENT
Start: 2019-04-12 | End: 2019-04-12

## 2019-04-12 RX ORDER — BUPIVACAINE HYDROCHLORIDE 5 MG/ML
INJECTION, SOLUTION PERINEURAL
Status: DISCONTINUED | OUTPATIENT
Start: 2019-04-12 | End: 2019-04-12 | Stop reason: HOSPADM

## 2019-04-12 RX ORDER — LIDOCAINE HYDROCHLORIDE 10 MG/ML
INJECTION, SOLUTION EPIDURAL; INFILTRATION; INTRACAUDAL; PERINEURAL
Status: DISCONTINUED | OUTPATIENT
Start: 2019-04-12 | End: 2019-04-12 | Stop reason: HOSPADM

## 2019-04-12 RX ORDER — PROPOFOL 10 MG/ML
VIAL (ML) INTRAVENOUS
Status: DISCONTINUED | OUTPATIENT
Start: 2019-04-12 | End: 2019-04-12

## 2019-04-12 RX ORDER — PHENYLEPHRINE HYDROCHLORIDE 10 MG/ML
INJECTION INTRAVENOUS
Status: DISCONTINUED | OUTPATIENT
Start: 2019-04-12 | End: 2019-04-12

## 2019-04-12 RX ORDER — GLYCOPYRROLATE 0.2 MG/ML
INJECTION INTRAMUSCULAR; INTRAVENOUS
Status: DISCONTINUED | OUTPATIENT
Start: 2019-04-12 | End: 2019-04-12

## 2019-04-12 RX ORDER — SODIUM CHLORIDE, SODIUM LACTATE, POTASSIUM CHLORIDE, CALCIUM CHLORIDE 600; 310; 30; 20 MG/100ML; MG/100ML; MG/100ML; MG/100ML
INJECTION, SOLUTION INTRAVENOUS CONTINUOUS PRN
Status: DISCONTINUED | OUTPATIENT
Start: 2019-04-12 | End: 2019-04-12

## 2019-04-12 RX ADMIN — LIDOCAINE HYDROCHLORIDE 100 MG: 20 INJECTION, SOLUTION INTRAVENOUS at 12:04

## 2019-04-12 RX ADMIN — PROPOFOL 20 MG: 10 INJECTION, EMULSION INTRAVENOUS at 12:04

## 2019-04-12 RX ADMIN — PROPOFOL 10 MG: 10 INJECTION, EMULSION INTRAVENOUS at 12:04

## 2019-04-12 RX ADMIN — PIPERACILLIN AND TAZOBACTAM 4.5 G: 4; .5 INJECTION, POWDER, LYOPHILIZED, FOR SOLUTION INTRAVENOUS; PARENTERAL at 03:04

## 2019-04-12 RX ADMIN — ESCITALOPRAM OXALATE 15 MG: 5 TABLET, FILM COATED ORAL at 08:04

## 2019-04-12 RX ADMIN — CARVEDILOL 6.25 MG: 6.25 TABLET, FILM COATED ORAL at 04:04

## 2019-04-12 RX ADMIN — PIPERACILLIN AND TAZOBACTAM 4.5 G: 4; .5 INJECTION, POWDER, LYOPHILIZED, FOR SOLUTION INTRAVENOUS; PARENTERAL at 11:04

## 2019-04-12 RX ADMIN — VANCOMYCIN HYDROCHLORIDE 1000 MG: 1 INJECTION, POWDER, FOR SOLUTION INTRAVENOUS at 01:04

## 2019-04-12 RX ADMIN — FAMOTIDINE 20 MG: 20 TABLET ORAL at 08:04

## 2019-04-12 RX ADMIN — DONEPEZIL HYDROCHLORIDE 10 MG: 10 TABLET, FILM COATED ORAL at 09:04

## 2019-04-12 RX ADMIN — SODIUM CHLORIDE, SODIUM LACTATE, POTASSIUM CHLORIDE, AND CALCIUM CHLORIDE: .6; .31; .03; .02 INJECTION, SOLUTION INTRAVENOUS at 11:04

## 2019-04-12 RX ADMIN — ARIPIPRAZOLE 5 MG: 5 TABLET ORAL at 08:04

## 2019-04-12 RX ADMIN — GLYCOPYRROLATE 0.1 MG: 0.2 INJECTION, SOLUTION INTRAMUSCULAR; INTRAVENOUS at 12:04

## 2019-04-12 RX ADMIN — PHENYLEPHRINE HYDROCHLORIDE 100 MCG: 10 INJECTION INTRAVENOUS at 12:04

## 2019-04-12 RX ADMIN — PROPOFOL 20 MG: 10 INJECTION, EMULSION INTRAVENOUS at 01:04

## 2019-04-12 RX ADMIN — PROPOFOL 10 MG: 10 INJECTION, EMULSION INTRAVENOUS at 01:04

## 2019-04-12 RX ADMIN — PROPOFOL 15 MG: 10 INJECTION, EMULSION INTRAVENOUS at 12:04

## 2019-04-12 RX ADMIN — PIPERACILLIN AND TAZOBACTAM 4.5 G: 4; .5 INJECTION, POWDER, LYOPHILIZED, FOR SOLUTION INTRAVENOUS; PARENTERAL at 08:04

## 2019-04-12 NOTE — NURSING
Pt resting in bed w/eyes closed no distress noted; IV antibiotics infusing; safety measures maintained will cont to monitor

## 2019-04-12 NOTE — NURSING
Pt resting in bed w/eyes closed no distress noted; IV antibiotics infusing; bed alarm set will cont to monitor

## 2019-04-12 NOTE — PROGRESS NOTES
Arterial; US/LUCIO report from yesterday:    Ankle-brachial index of 1.33 on the right and 1.24 on the left.    No hemodynamically significant stenosis demonstrated in the right or left lower extremity arterial system.    Will proceed with planned foot debridement today at noon.

## 2019-04-12 NOTE — OP NOTE
[facility]  Operative Note    SUMMARY     Surgery Date: 4/12/2019     Surgeon(s) and Role:     * Warner Kramer DPM - Primary    Assisting Surgeon: None    Pre-op Diagnosis:  Wound of foot [S91.309A]    Post-op Diagnosis:  Post-Op Diagnosis Codes:     * Wound of foot [S91.309A]    Procedure(s) (LRB):  DEBRIDEMENT, FOOT, BONE BIOPSY, WOUND VAC PLACEMENT (Left)    Anesthesia: Local MAC    Description of Procedure:   The patient was brought to the operating room on a stretcher and placed on the operating table in a supine position. Following the successful induction of MAC a local anesthetic block consisting of aproximately 20cc 1:1 mixture of 1% lidocaine plain + 0.5% ropivacaine plain was injected left heel region. Then, the left foot was scrubbed, prepped and draped in the usual aseptic manner.  A time out was performed.       Attention was directed to the left heel where an ulceration was noted measuring 5 x 3 x 1 cm.  The base was necrotic and malodorous.  Exploration showed the wound to probe to the calcaneus howver no deep space abscess noted. There was fullthickness areas of necrosis and devitalized tissue.     Utilizing a No 15 scalpel , I debrided the wound full-thickness through skin, subcutaneous tissue, fat,to the level of bone excising non-viable tissue.     Tissue removed included skin, subcutaneous tissue, necrotic fat down to the heel bone.  A Jamshidi was used to take a bone specimen from the left calcaneus and sent for permanent and cultures.  Wound cultures were also taken.  3000 ml NS was used to pulse lavage the wound.  A wound vac was applied to the left heel 125 mmHg pressure.  Foot dressed with cast padding, kerlix and ace wrap and she was placed back in the heel protector boot. Post-debridement, the wound was mildly bleeding with healthy tissue. Bleeding was well-controlled with direct pressure. Patient procedure tolerated well.     Pre debridement measurements : 5 x 3  x 1 cm  Post debridement measurements:6 x 4 x 1.5        The patient tolerated the procedure and anesthesia well. Shee was transferred to the recovery room with vital signs stable, vascular status intact, and capillary refill time < 3 seconds to the distal left foot. She will be transferred back to the floor under hospital medicine.  Wound check/wound vac change Monday by podiatry.  Monitor cultures and tailor antibiotics.  Continue to offload with heel protectors, no weigh left foot.         Estimated Blood Loss: 1cc         Specimens:   Specimen (12h ago, onward)    Start     Ordered    04/12/19 1250  Specimen to Pathology - Surgery  Once     Comments:  LEFT FOOT CALCANEUS     Start Status     04/12/19 1250 Collected (04/12/19 1250) Order ID: [id number]       04/12/19 1308

## 2019-04-12 NOTE — ASSESSMENT & PLAN NOTE
Chronic heal ulcer with worsening and now suspected acute osteomyelitis. Appreciate podiatry's recs. Empirically started on Vanc and  Zosyn on admission. Debridement and bone biopsy 4/12/2019

## 2019-04-12 NOTE — BRIEF OP NOTE
[facility]  Brief Operative Note    SUMMARY     Surgery Date: 4/12/2019     Surgeon(s) and Role:     * Warner Kramer DPM - Primary    Assisting Surgeon: None    Pre-op Diagnosis:  Wound of foot [S91.309A]    Post-op Diagnosis:  Post-Op Diagnosis Codes:     * Wound of foot [S91.309A]    Procedure(s) (LRB):  DEBRIDEMENT, FOOT, BONE BIOPSY, WOUND VAC PLACEMENT (Left)    Anesthesia: Local MAC    Description of Procedure: Debridement of left heel wound to bone, application of wound vac    Description of the findings of the procedure: Wound culture, permanent specimen to path and left calcaneal bone culture obtained.  Will await results of bone specimen and culture to determine need for treatment of acute osteomyelitis.  Patient will require serial debridements at bedside and as outpatient    Estimated Blood Loss: 1 cc         Specimens:   Specimen (12h ago, onward)    Start     Ordered    04/12/19 1250  Specimen to Pathology - Surgery  Once     Comments:  LEFT FOOT CALCANEUS     Start Status     04/12/19 1250 Collected (04/12/19 1250) Order ID: [id number]       04/12/19 1308

## 2019-04-12 NOTE — SUBJECTIVE & OBJECTIVE
Interval History:  No acute events overnight. No complaints this morning.    Review of Systems   Constitutional: Negative for chills and fever.   Respiratory: Negative for cough and shortness of breath.    Cardiovascular: Negative for chest pain and leg swelling.     Objective:     Vital Signs (Most Recent):  Temp: 98.1 °F (36.7 °C) (04/12/19 1120)  Pulse: 60 (04/12/19 1120)  Resp: 18 (04/12/19 1120)  BP: (!) 110/56 (04/12/19 1120)  SpO2: 96 % (04/12/19 1120) Vital Signs (24h Range):  Temp:  [97.6 °F (36.4 °C)-98.6 °F (37 °C)] 98.1 °F (36.7 °C)  Pulse:  [50-74] 60  Resp:  [16-18] 18  SpO2:  [96 %-100 %] 96 %  BP: (110-138)/(56-64) 110/56     Weight: 75.1 kg (165 lb 9.6 oz)  Body mass index is 26.73 kg/m².    Physical Exam   Constitutional: She appears well-developed and well-nourished. No distress.   HENT:   Head: Normocephalic and atraumatic.   Right Ear: External ear normal.   Left Ear: External ear normal.   Nose: Nose normal.   Eyes: Right eye exhibits no discharge. Left eye exhibits no discharge.   Neck: Normal range of motion.   Cardiovascular: Normal rate, regular rhythm, normal heart sounds and intact distal pulses. Exam reveals no gallop and no friction rub.   No murmur heard.  Pulmonary/Chest: Effort normal and breath sounds normal. No stridor. No respiratory distress. She has no wheezes. She has no rales. She exhibits no tenderness.   Abdominal: Soft. Bowel sounds are normal. She exhibits no distension. There is no tenderness.   Musculoskeletal: Normal range of motion. She exhibits no edema.   Neurological: She is alert.   Oriented to person, place, and situation   Skin: She is not diaphoretic.   There is a large maloderous wound to the left heel with a necrotic center with minimal drainage.     Nursing note and vitals reviewed.          Significant Labs: All pertinent labs within the past 24 hours have been reviewed.    Significant Imaging: I have reviewed and interpreted all pertinent imaging  results/findings within the past 24 hours.

## 2019-04-12 NOTE — TRANSFER OF CARE
"Anesthesia Transfer of Care Note    Patient: Keira tSarkey    Procedure(s) Performed: Procedure(s) (LRB):  DEBRIDEMENT, FOOT, BONE BIOPSY, WOUND VAC PLACEMENT (Left)    Patient location: PACU    Anesthesia Type: general    Transport from OR: Transported from OR on room air with adequate spontaneous ventilation    Post pain: adequate analgesia    Post assessment: no apparent anesthetic complications and tolerated procedure well    Post vital signs: stable    Level of consciousness: awake and alert    Nausea/Vomiting: no nausea/vomiting    Complications: none    Transfer of care protocol was followed      Last vitals:   Visit Vitals  Blood Pressure (Abnormal) 144/63 (BP Location: Left arm, Patient Position: Lying)   Pulse 70   Temperature 36.7 °C (98.1 °F) (Oral)   Respiration 18   Height 5' 6" (1.676 m)   Weight 75.1 kg (165 lb 9.6 oz)   Oxygen Saturation 99%   Breastfeeding? No   Body Mass Index 26.73 kg/m²     "

## 2019-04-12 NOTE — PLAN OF CARE
Problem: Adult Inpatient Plan of Care  Goal: Plan of Care Review  Outcome: Ongoing (interventions implemented as appropriate)  Pt's free of accidental injury during shift. No s/s of distress noted. Denies pain at present. S/P debridement and bone biopsy of left foot. Wound vac in place. Dressing CDI. Off floating boots in place. Diet resumed. Safety measures maintained. Bed alarm active and audible. Will continue to monitor

## 2019-04-12 NOTE — PROGRESS NOTES
"[facility] Hospital Medicine  Progress Note    Patient Name: Keira Starkey  MRN: [id number]  Patient Class: IP- Inpatient   Admission Date: 4/10/2019  Length of Stay: 2 days  Attending Physician: Mimi Pace MD  Primary Care Provider: Primary Doctor No        Subjective:     Principal Problem:Acute hematogenous osteomyelitis of left foot    HPI:  Mrs. Keira Starkey is a 74 y.o. female [facility] resident with essential hypertension, CKD stage 3, anemia chronic disease, GERD, dementia, and depression who presents to [facility] ED with complaints of left heel infection.  NH staff was concerned about the possibility of osteomyelitis and would like her to be evaluated.  Further history is otherwise limited at this time.    Hospital Course:  Ms. Starkey was admitted for a left heal wound with imaging concerning for osteomyelitis. Xray and MRI of the foot were both borderline with MRI read as, "Osseous edema within the posterior calcaneus which may be secondary to reactive edema in this patient with posterior heel ulceration, although potential early developing osteomyelitis not excluded." She was not septic but was started empirically on antibiotics on admission. Podiatry evaluated the patient. Arterial studies showed adequate flow. She went for debridement and bone biopsy 4/12/2019.    Interval History:  No acute events overnight. No complaints this morning.    Review of Systems   Constitutional: Negative for chills and fever.   Respiratory: Negative for cough and shortness of breath.    Cardiovascular: Negative for chest pain and leg swelling.     Objective:     Vital Signs (Most Recent):  Temp: 98.1 °F (36.7 °C) (04/12/19 1120)  Pulse: 60 (04/12/19 1120)  Resp: 18 (04/12/19 1120)  BP: (!) 110/56 (04/12/19 1120)  SpO2: 96 % (04/12/19 1120) Vital Signs (24h Range):  Temp:  [97.6 °F (36.4 °C)-98.6 °F (37 °C)] 98.1 °F (36.7 °C)  Pulse:  [50-74] 60  Resp:  [16-18] 18  SpO2:  [96 %-100 %] 96 " %  BP: (110-138)/(56-64) 110/56     Weight: 75.1 kg (165 lb 9.6 oz)  Body mass index is 26.73 kg/m².    Physical Exam   Constitutional: She appears well-developed and well-nourished. No distress.   HENT:   Head: Normocephalic and atraumatic.   Right Ear: External ear normal.   Left Ear: External ear normal.   Nose: Nose normal.   Eyes: Right eye exhibits no discharge. Left eye exhibits no discharge.   Neck: Normal range of motion.   Cardiovascular: Normal rate, regular rhythm, normal heart sounds and intact distal pulses. Exam reveals no gallop and no friction rub.   No murmur heard.  Pulmonary/Chest: Effort normal and breath sounds normal. No stridor. No respiratory distress. She has no wheezes. She has no rales. She exhibits no tenderness.   Abdominal: Soft. Bowel sounds are normal. She exhibits no distension. There is no tenderness.   Musculoskeletal: Normal range of motion. She exhibits no edema.   Neurological: She is alert.   Oriented to person, place, and situation   Skin: She is not diaphoretic.   There is a large maloderous wound to the left heel with a necrotic center with minimal drainage.     Nursing note and vitals reviewed.          Significant Labs: All pertinent labs within the past 24 hours have been reviewed.    Significant Imaging: I have reviewed and interpreted all pertinent imaging results/findings within the past 24 hours.    Assessment/Plan:      * Acute hematogenous osteomyelitis of left foot  Chronic heal ulcer with worsening and now suspected acute osteomyelitis. Appreciate podiatry's recs. Empirically started on Vanc and  Zosyn on admission. Debridement and bone biopsy 4/12/2019    Chronic heel ulcer, left, with necrosis of bone  See above    Chronic heel ulcer, left, with necrosis of muscle  See above    Depression  Stable; will continue her home regimen of escitalopram.    Dementia with behavioral disturbance  Stable; will continue home regimen of donepezil.    GERD (gastroesophageal  reflux disease)  Will substitute her home regimen of ranitidine for famotidine while inpatient.    Anemia of chronic disease  The patient's H/H is stable and consistent with previous laboratory measurements, and the patient exhibits no signs or symptoms of acute bleeding; there is no indication for transfusion.  Will continue to monitor.    CKD stage 3  On review of outside records, her baseline creatinine is around 1.4-1.6.  Her creatinine today is 1.7 which is near her baseline.  Will continue to monitor urine output.    Essential hypertension  Patient's blood pressure is fairly-controlled; will continue home regimen of carvedilol and provide as-needed clonidine.    Open wound of left heel  See above      VTE Risk Mitigation (From admission, onward)        Ordered     IP VTE HIGH RISK PATIENT  Once      04/11/19 0056     Place PAUL hose  Until discontinued      04/11/19 0056     Place sequential compression device  Until discontinued      04/11/19 0056              Mimi Pace MD  Department of Hospital Medicine   [facility]

## 2019-04-12 NOTE — HOSPITAL COURSE
"Ms. Starkey was admitted for a left heal wound with imaging concerning for osteomyelitis. Xray and MRI of the foot were both borderline with MRI read as, "Osseous edema within the posterior calcaneus which may be secondary to reactive edema in this patient with posterior heel ulceration, although potential early developing osteomyelitis not excluded."  She was not septic but was started empirically on antibiotics on admission. Podiatry evaluated the patient. Arterial studies showed adequate flow. She went for debridement and bone biopsy 4/12/2019. She tolerated the procedure well. Cultures growing Proteus mirabilis. ID consulted.  ID recommended IV Rocephin and oral Flagyl until 5/23/19.  Patient has remained afebrile and hemodynamically stable.  Podiatry placed wound vac to left heel.  Patient will be discharged to SNF to continue ABx treatment and wound care.  Patient to follow up with Podiatry and ID.  "

## 2019-04-12 NOTE — NURSING
Pt resting in bed no distress noted no complaint of pain; scheduled meds given w/out difficulty; npo except meds; tele sitter at bedside; IV saline lock; SCDs; sundance boots placed; safety measures maintained bed alarm set will cont to monitor

## 2019-04-12 NOTE — ANESTHESIA PREPROCEDURE EVALUATION
04/12/2019  Keira Starkey is a 74 y.o., female.    Anesthesia Evaluation    I have reviewed the Patient Summary Reports.    I have reviewed the Nursing Notes.   I have reviewed the Medications.     Review of Systems  Anesthesia Hx:  No problems with previous Anesthesia  Neg history of prior surgery. Denies Family Hx of Anesthesia complications.   Denies Personal Hx of Anesthesia complications.   Social:  Non-Smoker    Hematology/Oncology:         -- Anemia:   Cardiovascular:   Exercise tolerance: poor Hypertension    Renal/:   Chronic Renal Disease, CRI    Hepatic/GI:   GERD    Musculoskeletal:   osteomyelitis   Psych:   Psychiatric History depression Dementia         Physical Exam  General:  Well nourished    Airway/Jaw/Neck:  Airway Findings: Mouth Opening: Normal Tongue: Normal  General Airway Assessment: Adult  Mallampati: II     Eyes/Ears/Nose:  Eyes/Ears/Nose Findings:          Mental Status:  Mental Status Findings:  Cooperative  alert       Anesthesia Plan  Type of Anesthesia, risks & benefits discussed:  Anesthesia Type:  MAC  Patient's Preference:   Intra-op Monitoring Plan: standard ASA monitors  Intra-op Monitoring Plan Comments:   Post Op Pain Control Plan: multimodal analgesia, IV/PO Opioids PRN and per primary service following discharge from PACU  Post Op Pain Control Plan Comments:   Induction:   IV  Beta Blocker:  Patient is not currently on a Beta-Blocker (No further documentation required).       Informed Consent: Patient understands risks and agrees with Anesthesia plan.  Questions answered. Anesthesia consent signed with patient.  ASA Score: 2     Day of Surgery Review of History & Physical: I have interviewed and examined the patient. I have reviewed the patient's H&P dated: 04/12/19.    H&P completed by Anesthesiologist.       Ready For Surgery From Anesthesia Perspective.

## 2019-04-13 LAB
ANION GAP SERPL CALC-SCNC: 9 MMOL/L (ref 8–16)
BASOPHILS # BLD AUTO: 0.02 K/UL (ref 0–0.2)
BASOPHILS NFR BLD: 0.2 % (ref 0–1.9)
BUN SERPL-MCNC: 22 MG/DL (ref 8–23)
CALCIUM SERPL-MCNC: 9.8 MG/DL (ref 8.7–10.5)
CHLORIDE SERPL-SCNC: 107 MMOL/L (ref 95–110)
CO2 SERPL-SCNC: 24 MMOL/L (ref 23–29)
CREAT SERPL-MCNC: 1.8 MG/DL (ref 0.5–1.4)
DIFFERENTIAL METHOD: ABNORMAL
EOSINOPHIL # BLD AUTO: 0.3 K/UL (ref 0–0.5)
EOSINOPHIL NFR BLD: 2.5 % (ref 0–8)
ERYTHROCYTE [DISTWIDTH] IN BLOOD BY AUTOMATED COUNT: 14.8 % (ref 11.5–14.5)
EST. GFR  (AFRICAN AMERICAN): 31 ML/MIN/1.73 M^2
EST. GFR  (NON AFRICAN AMERICAN): 27 ML/MIN/1.73 M^2
GLUCOSE SERPL-MCNC: 121 MG/DL (ref 70–110)
GRAM STN SPEC: NORMAL
HCT VFR BLD AUTO: 33 % (ref 37–48.5)
HGB BLD-MCNC: 9.8 G/DL (ref 12–16)
LYMPHOCYTES # BLD AUTO: 2.4 K/UL (ref 1–4.8)
LYMPHOCYTES NFR BLD: 22.4 % (ref 18–48)
MCH RBC QN AUTO: 25.8 PG (ref 27–31)
MCHC RBC AUTO-ENTMCNC: 29.7 G/DL (ref 32–36)
MCV RBC AUTO: 87 FL (ref 82–98)
MONOCYTES # BLD AUTO: 1 K/UL (ref 0.3–1)
MONOCYTES NFR BLD: 9.3 % (ref 4–15)
NEUTROPHILS # BLD AUTO: 7 K/UL (ref 1.8–7.7)
NEUTROPHILS NFR BLD: 65.6 % (ref 38–73)
PLATELET # BLD AUTO: 347 K/UL (ref 150–350)
PMV BLD AUTO: 8.6 FL (ref 9.2–12.9)
POTASSIUM SERPL-SCNC: 4.3 MMOL/L (ref 3.5–5.1)
RBC # BLD AUTO: 3.8 M/UL (ref 4–5.4)
SODIUM SERPL-SCNC: 140 MMOL/L (ref 136–145)
WBC # BLD AUTO: 10.73 K/UL (ref 3.9–12.7)

## 2019-04-13 PROCEDURE — 11000001 HC ACUTE MED/SURG PRIVATE ROOM

## 2019-04-13 PROCEDURE — 63600175 PHARM REV CODE 636 W HCPCS

## 2019-04-13 PROCEDURE — 36415 COLL VENOUS BLD VENIPUNCTURE: CPT

## 2019-04-13 PROCEDURE — 25000003 PHARM REV CODE 250

## 2019-04-13 PROCEDURE — 85025 COMPLETE CBC W/AUTO DIFF WBC: CPT

## 2019-04-13 PROCEDURE — 80048 BASIC METABOLIC PNL TOTAL CA: CPT

## 2019-04-13 RX ADMIN — PIPERACILLIN AND TAZOBACTAM 4.5 G: 4; .5 INJECTION, POWDER, LYOPHILIZED, FOR SOLUTION INTRAVENOUS; PARENTERAL at 12:04

## 2019-04-13 RX ADMIN — PIPERACILLIN AND TAZOBACTAM 4.5 G: 4; .5 INJECTION, POWDER, LYOPHILIZED, FOR SOLUTION INTRAVENOUS; PARENTERAL at 04:04

## 2019-04-13 RX ADMIN — DONEPEZIL HYDROCHLORIDE 10 MG: 10 TABLET, FILM COATED ORAL at 08:04

## 2019-04-13 RX ADMIN — PIPERACILLIN AND TAZOBACTAM 4.5 G: 4; .5 INJECTION, POWDER, LYOPHILIZED, FOR SOLUTION INTRAVENOUS; PARENTERAL at 08:04

## 2019-04-13 RX ADMIN — CARVEDILOL 6.25 MG: 6.25 TABLET, FILM COATED ORAL at 09:04

## 2019-04-13 RX ADMIN — ESCITALOPRAM OXALATE 15 MG: 5 TABLET, FILM COATED ORAL at 09:04

## 2019-04-13 RX ADMIN — VANCOMYCIN HYDROCHLORIDE 1000 MG: 1 INJECTION, POWDER, FOR SOLUTION INTRAVENOUS at 01:04

## 2019-04-13 RX ADMIN — CARVEDILOL 6.25 MG: 6.25 TABLET, FILM COATED ORAL at 05:04

## 2019-04-13 RX ADMIN — FAMOTIDINE 20 MG: 20 TABLET ORAL at 09:04

## 2019-04-13 RX ADMIN — ARIPIPRAZOLE 5 MG: 5 TABLET ORAL at 09:04

## 2019-04-13 NOTE — PROGRESS NOTES
"[facility] Hospital Medicine  Progress Note    Patient Name: Keira Starkey  MRN: [id number]  Patient Class: IP- Inpatient   Admission Date: 4/10/2019  Length of Stay: 3 days  Attending Physician: Mimi Pace MD  Primary Care Provider: Primary Doctor No        Subjective:     Principal Problem:Acute hematogenous osteomyelitis of left foot    HPI:  Mrs. Keira Starkey is a 74 y.o. female [facility] resident with essential hypertension, CKD stage 3, anemia chronic disease, GERD, dementia, and depression who presents to [facility] ED with complaints of left heel infection.  NH staff was concerned about the possibility of osteomyelitis and would like her to be evaluated.  Further history is otherwise limited at this time.    Hospital Course:  Ms. Starkey was admitted for a left heal wound with imaging concerning for osteomyelitis. Xray and MRI of the foot were both borderline with MRI read as, "Osseous edema within the posterior calcaneus which may be secondary to reactive edema in this patient with posterior heel ulceration, although potential early developing osteomyelitis not excluded." She was not septic but was started empirically on antibiotics on admission. Podiatry evaluated the patient. Arterial studies showed adequate flow. She went for debridement and bone biopsy 4/12/2019. She tolerated the procedure well. Biopsy pending.    Interval History:  Doing well. Tolerated the surgery well. Denies pain.    Review of Systems   Constitutional: Negative for chills and fever.   Respiratory: Negative for cough and shortness of breath.    Cardiovascular: Negative for chest pain and leg swelling.     Objective:     Vital Signs (Most Recent):  Temp: 98.2 °F (36.8 °C) (04/13/19 1137)  Pulse: (!) 58 (04/13/19 1137)  Resp: 18 (04/13/19 1137)  BP: 130/60 (04/13/19 1137)  SpO2: 100 % (04/13/19 1137) Vital Signs (24h Range):  Temp:  [97.2 °F (36.2 °C)-98.7 °F (37.1 °C)] 98.2 °F (36.8 °C)  Pulse:  " [56-79] 58  Resp:  [13-18] 18  SpO2:  [95 %-100 %] 100 %  BP: (112-158)/(54-75) 130/60     Weight: 75.1 kg (165 lb 9.6 oz)  Body mass index is 26.73 kg/m².    Physical Exam   Constitutional: She appears well-developed and well-nourished. No distress.   HENT:   Head: Normocephalic and atraumatic.   Right Ear: External ear normal.   Left Ear: External ear normal.   Nose: Nose normal.   Eyes: Right eye exhibits no discharge. Left eye exhibits no discharge.   Neck: Normal range of motion.   Cardiovascular: Normal rate, regular rhythm, normal heart sounds and intact distal pulses. Exam reveals no gallop and no friction rub.   No murmur heard.  Pulmonary/Chest: Effort normal and breath sounds normal. No stridor. No respiratory distress. She has no wheezes. She has no rales. She exhibits no tenderness.   Abdominal: Soft. Bowel sounds are normal. She exhibits no distension. There is no tenderness.   Musculoskeletal: Normal range of motion. She exhibits no edema.   Neurological: She is alert.   Oriented to person, place, and situation   Skin: She is not diaphoretic.   Wound debrided, dressing in place. Wound vac in place.   Nursing note and vitals reviewed.          Significant Labs: All pertinent labs within the past 24 hours have been reviewed.    Significant Imaging: I have reviewed and interpreted all pertinent imaging results/findings within the past 24 hours.    Assessment/Plan:      * Acute hematogenous osteomyelitis of left foot  Chronic heal ulcer with worsening and now suspected acute osteomyelitis. Appreciate podiatry's recs. Empirically started on Vanc and  Zosyn on admission. Debridement and bone biopsy 4/12/2019. Bone biopsy and cultures.     Chronic heel ulcer, left, with necrosis of bone  See above    Chronic heel ulcer, left, with necrosis of muscle  See above    Depression  Stable; will continue her home regimen of escitalopram.    Dementia with behavioral disturbance  Stable; will continue home regimen of  donepezil.    GERD (gastroesophageal reflux disease)  Will substitute her home regimen of ranitidine for famotidine while inpatient.    Anemia of chronic disease  The patient's H/H is stable and consistent with previous laboratory measurements, and the patient exhibits no signs or symptoms of acute bleeding; there is no indication for transfusion.  Will continue to monitor.    CKD stage 3  Cr stable at baseline around 1.4-1.6.     Essential hypertension  Patient's blood pressure is fairly-controlled; will continue home regimen of carvedilol and provide as-needed clonidine.    Open wound of left heel  See above    VTE Risk Mitigation (From admission, onward)        Ordered     IP VTE HIGH RISK PATIENT  Once      04/11/19 0056     Place PAUL hose  Until discontinued      04/11/19 0056     Place sequential compression device  Until discontinued      04/11/19 0056              Mimi Pace MD  Department of Hospital Medicine   [facility]

## 2019-04-13 NOTE — ASSESSMENT & PLAN NOTE
Chronic heal ulcer with worsening and now suspected acute osteomyelitis. Appreciate podiatry's recs. Empirically started on Vanc and  Zosyn on admission. Debridement and bone biopsy 4/12/2019. Bone biopsy and cultures.

## 2019-04-13 NOTE — SUBJECTIVE & OBJECTIVE
Interval History:  Doing well. Tolerated the surgery well. Denies pain.    Review of Systems   Constitutional: Negative for chills and fever.   Respiratory: Negative for cough and shortness of breath.    Cardiovascular: Negative for chest pain and leg swelling.     Objective:     Vital Signs (Most Recent):  Temp: 98.2 °F (36.8 °C) (04/13/19 1137)  Pulse: (!) 58 (04/13/19 1137)  Resp: 18 (04/13/19 1137)  BP: 130/60 (04/13/19 1137)  SpO2: 100 % (04/13/19 1137) Vital Signs (24h Range):  Temp:  [97.2 °F (36.2 °C)-98.7 °F (37.1 °C)] 98.2 °F (36.8 °C)  Pulse:  [56-79] 58  Resp:  [13-18] 18  SpO2:  [95 %-100 %] 100 %  BP: (112-158)/(54-75) 130/60     Weight: 75.1 kg (165 lb 9.6 oz)  Body mass index is 26.73 kg/m².    Physical Exam   Constitutional: She appears well-developed and well-nourished. No distress.   HENT:   Head: Normocephalic and atraumatic.   Right Ear: External ear normal.   Left Ear: External ear normal.   Nose: Nose normal.   Eyes: Right eye exhibits no discharge. Left eye exhibits no discharge.   Neck: Normal range of motion.   Cardiovascular: Normal rate, regular rhythm, normal heart sounds and intact distal pulses. Exam reveals no gallop and no friction rub.   No murmur heard.  Pulmonary/Chest: Effort normal and breath sounds normal. No stridor. No respiratory distress. She has no wheezes. She has no rales. She exhibits no tenderness.   Abdominal: Soft. Bowel sounds are normal. She exhibits no distension. There is no tenderness.   Musculoskeletal: Normal range of motion. She exhibits no edema.   Neurological: She is alert.   Oriented to person, place, and situation   Skin: She is not diaphoretic.   Wound debrided, dressing in place. Wound vac in place.   Nursing note and vitals reviewed.          Significant Labs: All pertinent labs within the past 24 hours have been reviewed.    Significant Imaging: I have reviewed and interpreted all pertinent imaging results/findings within the past 24 hours.

## 2019-04-13 NOTE — PLAN OF CARE
Problem: Adult Inpatient Plan of Care  Goal: Plan of Care Review  Outcome: Ongoing (interventions implemented as appropriate)  Pt free from falls, injury or any further trauma throughout shift. Pt oriented to self. Continued medications as ordered, no complaints of pain throughout shift. Sun dance boots in use. Pt turn q2 hrs. Wound vac with minimal output. Pt in no distress. Will cont to monitor.    04/13/19 9469   Plan of Care Review   Plan of Care Reviewed With patient

## 2019-04-14 LAB
ANION GAP SERPL CALC-SCNC: 12 MMOL/L (ref 8–16)
BACTERIA SPEC AEROBE CULT: NORMAL
BACTERIA SPEC AEROBE CULT: NORMAL
BASOPHILS # BLD AUTO: 0.03 K/UL (ref 0–0.2)
BASOPHILS NFR BLD: 0.4 % (ref 0–1.9)
BUN SERPL-MCNC: 23 MG/DL (ref 8–23)
CALCIUM SERPL-MCNC: 9.6 MG/DL (ref 8.7–10.5)
CHLORIDE SERPL-SCNC: 109 MMOL/L (ref 95–110)
CO2 SERPL-SCNC: 19 MMOL/L (ref 23–29)
CREAT SERPL-MCNC: 1.8 MG/DL (ref 0.5–1.4)
DIFFERENTIAL METHOD: ABNORMAL
EOSINOPHIL # BLD AUTO: 0.4 K/UL (ref 0–0.5)
EOSINOPHIL NFR BLD: 4.5 % (ref 0–8)
ERYTHROCYTE [DISTWIDTH] IN BLOOD BY AUTOMATED COUNT: 14.5 % (ref 11.5–14.5)
EST. GFR  (AFRICAN AMERICAN): 31 ML/MIN/1.73 M^2
EST. GFR  (NON AFRICAN AMERICAN): 27 ML/MIN/1.73 M^2
GLUCOSE SERPL-MCNC: 65 MG/DL (ref 70–110)
HCT VFR BLD AUTO: 31.3 % (ref 37–48.5)
HGB BLD-MCNC: 9.5 G/DL (ref 12–16)
LYMPHOCYTES # BLD AUTO: 2.7 K/UL (ref 1–4.8)
LYMPHOCYTES NFR BLD: 33 % (ref 18–48)
MCH RBC QN AUTO: 26 PG (ref 27–31)
MCHC RBC AUTO-ENTMCNC: 30.4 G/DL (ref 32–36)
MCV RBC AUTO: 86 FL (ref 82–98)
MONOCYTES # BLD AUTO: 0.8 K/UL (ref 0.3–1)
MONOCYTES NFR BLD: 9.4 % (ref 4–15)
NEUTROPHILS # BLD AUTO: 4.3 K/UL (ref 1.8–7.7)
NEUTROPHILS NFR BLD: 52.7 % (ref 38–73)
PLATELET # BLD AUTO: 298 K/UL (ref 150–350)
PMV BLD AUTO: 8.4 FL (ref 9.2–12.9)
POTASSIUM SERPL-SCNC: 4.2 MMOL/L (ref 3.5–5.1)
RBC # BLD AUTO: 3.65 M/UL (ref 4–5.4)
SODIUM SERPL-SCNC: 140 MMOL/L (ref 136–145)
VANCOMYCIN TROUGH SERPL-MCNC: 22.9 UG/ML (ref 10–22)
WBC # BLD AUTO: 8.06 K/UL (ref 3.9–12.7)

## 2019-04-14 PROCEDURE — 63600175 PHARM REV CODE 636 W HCPCS

## 2019-04-14 PROCEDURE — 80202 ASSAY OF VANCOMYCIN: CPT

## 2019-04-14 PROCEDURE — 85025 COMPLETE CBC W/AUTO DIFF WBC: CPT

## 2019-04-14 PROCEDURE — 25000003 PHARM REV CODE 250

## 2019-04-14 PROCEDURE — 36415 COLL VENOUS BLD VENIPUNCTURE: CPT

## 2019-04-14 PROCEDURE — 11000001 HC ACUTE MED/SURG PRIVATE ROOM

## 2019-04-14 PROCEDURE — 80048 BASIC METABOLIC PNL TOTAL CA: CPT

## 2019-04-14 RX ADMIN — DONEPEZIL HYDROCHLORIDE 10 MG: 10 TABLET, FILM COATED ORAL at 09:04

## 2019-04-14 RX ADMIN — ESCITALOPRAM OXALATE 15 MG: 5 TABLET, FILM COATED ORAL at 09:04

## 2019-04-14 RX ADMIN — PIPERACILLIN AND TAZOBACTAM 4.5 G: 4; .5 INJECTION, POWDER, LYOPHILIZED, FOR SOLUTION INTRAVENOUS; PARENTERAL at 10:04

## 2019-04-14 RX ADMIN — FAMOTIDINE 20 MG: 20 TABLET ORAL at 09:04

## 2019-04-14 RX ADMIN — PIPERACILLIN AND TAZOBACTAM 4.5 G: 4; .5 INJECTION, POWDER, LYOPHILIZED, FOR SOLUTION INTRAVENOUS; PARENTERAL at 04:04

## 2019-04-14 RX ADMIN — CARVEDILOL 6.25 MG: 6.25 TABLET, FILM COATED ORAL at 09:04

## 2019-04-14 RX ADMIN — ARIPIPRAZOLE 5 MG: 5 TABLET ORAL at 09:04

## 2019-04-14 RX ADMIN — CARVEDILOL 6.25 MG: 6.25 TABLET, FILM COATED ORAL at 05:04

## 2019-04-14 RX ADMIN — PIPERACILLIN AND TAZOBACTAM 4.5 G: 4; .5 INJECTION, POWDER, LYOPHILIZED, FOR SOLUTION INTRAVENOUS; PARENTERAL at 12:04

## 2019-04-15 LAB
ANION GAP SERPL CALC-SCNC: 7 MMOL/L (ref 8–16)
BACTERIA SPEC ANAEROBE CULT: NORMAL
BACTERIA SPEC ANAEROBE CULT: NORMAL
BASOPHILS # BLD AUTO: 0.02 K/UL (ref 0–0.2)
BASOPHILS NFR BLD: 0.3 % (ref 0–1.9)
BUN SERPL-MCNC: 19 MG/DL (ref 8–23)
CALCIUM SERPL-MCNC: 9.6 MG/DL (ref 8.7–10.5)
CHLORIDE SERPL-SCNC: 107 MMOL/L (ref 95–110)
CO2 SERPL-SCNC: 24 MMOL/L (ref 23–29)
CREAT SERPL-MCNC: 1.5 MG/DL (ref 0.5–1.4)
DIFFERENTIAL METHOD: ABNORMAL
EOSINOPHIL # BLD AUTO: 0.4 K/UL (ref 0–0.5)
EOSINOPHIL NFR BLD: 4.5 % (ref 0–8)
ERYTHROCYTE [DISTWIDTH] IN BLOOD BY AUTOMATED COUNT: 14.4 % (ref 11.5–14.5)
EST. GFR  (AFRICAN AMERICAN): 39 ML/MIN/1.73 M^2
EST. GFR  (NON AFRICAN AMERICAN): 34 ML/MIN/1.73 M^2
GLUCOSE SERPL-MCNC: 118 MG/DL (ref 70–110)
HCT VFR BLD AUTO: 31.3 % (ref 37–48.5)
HGB BLD-MCNC: 9.4 G/DL (ref 12–16)
LYMPHOCYTES # BLD AUTO: 2.3 K/UL (ref 1–4.8)
LYMPHOCYTES NFR BLD: 29.9 % (ref 18–48)
MCH RBC QN AUTO: 25.8 PG (ref 27–31)
MCHC RBC AUTO-ENTMCNC: 30 G/DL (ref 32–36)
MCV RBC AUTO: 86 FL (ref 82–98)
MONOCYTES # BLD AUTO: 0.7 K/UL (ref 0.3–1)
MONOCYTES NFR BLD: 8.7 % (ref 4–15)
NEUTROPHILS # BLD AUTO: 4.4 K/UL (ref 1.8–7.7)
NEUTROPHILS NFR BLD: 56.6 % (ref 38–73)
PLATELET # BLD AUTO: 303 K/UL (ref 150–350)
PMV BLD AUTO: 8.5 FL (ref 9.2–12.9)
POTASSIUM SERPL-SCNC: 3.8 MMOL/L (ref 3.5–5.1)
RBC # BLD AUTO: 3.65 M/UL (ref 4–5.4)
SODIUM SERPL-SCNC: 138 MMOL/L (ref 136–145)
WBC # BLD AUTO: 7.8 K/UL (ref 3.9–12.7)

## 2019-04-15 PROCEDURE — 11000001 HC ACUTE MED/SURG PRIVATE ROOM

## 2019-04-15 PROCEDURE — 63600175 PHARM REV CODE 636 W HCPCS

## 2019-04-15 PROCEDURE — 99223 PR INITIAL HOSPITAL CARE,LEVL III: ICD-10-PCS | Mod: ,,, | Performed by: INTERNAL MEDICINE

## 2019-04-15 PROCEDURE — 63600175 PHARM REV CODE 636 W HCPCS: Performed by: PHYSICIAN ASSISTANT

## 2019-04-15 PROCEDURE — 25000003 PHARM REV CODE 250: Performed by: PHYSICIAN ASSISTANT

## 2019-04-15 PROCEDURE — 36415 COLL VENOUS BLD VENIPUNCTURE: CPT

## 2019-04-15 PROCEDURE — 25000003 PHARM REV CODE 250

## 2019-04-15 PROCEDURE — 85025 COMPLETE CBC W/AUTO DIFF WBC: CPT

## 2019-04-15 PROCEDURE — 99232 SBSQ HOSP IP/OBS MODERATE 35: CPT | Mod: ,,, | Performed by: PODIATRIST

## 2019-04-15 PROCEDURE — 36569 INSJ PICC 5 YR+ W/O IMAGING: CPT

## 2019-04-15 PROCEDURE — 99232 PR SUBSEQUENT HOSPITAL CARE,LEVL II: ICD-10-PCS | Mod: ,,, | Performed by: PODIATRIST

## 2019-04-15 PROCEDURE — 99223 1ST HOSP IP/OBS HIGH 75: CPT | Mod: ,,, | Performed by: INTERNAL MEDICINE

## 2019-04-15 PROCEDURE — 80048 BASIC METABOLIC PNL TOTAL CA: CPT

## 2019-04-15 RX ORDER — SODIUM CHLORIDE 0.9 % (FLUSH) 0.9 %
10 SYRINGE (ML) INJECTION
Status: DISCONTINUED | OUTPATIENT
Start: 2019-04-15 | End: 2019-04-16 | Stop reason: HOSPADM

## 2019-04-15 RX ORDER — METRONIDAZOLE 500 MG/1
500 TABLET ORAL EVERY 8 HOURS
Qty: 90 TABLET | Refills: 1
Start: 2019-04-15 | End: 2019-05-23

## 2019-04-15 RX ORDER — METRONIDAZOLE 500 MG/1
500 TABLET ORAL EVERY 8 HOURS
Status: DISCONTINUED | OUTPATIENT
Start: 2019-04-15 | End: 2019-04-16 | Stop reason: HOSPADM

## 2019-04-15 RX ORDER — SODIUM CHLORIDE 0.9 % (FLUSH) 0.9 %
10 SYRINGE (ML) INJECTION EVERY 6 HOURS
Status: DISCONTINUED | OUTPATIENT
Start: 2019-04-16 | End: 2019-04-16 | Stop reason: HOSPADM

## 2019-04-15 RX ADMIN — ACETAMINOPHEN 500 MG: 500 TABLET, FILM COATED ORAL at 07:04

## 2019-04-15 RX ADMIN — DONEPEZIL HYDROCHLORIDE 10 MG: 10 TABLET, FILM COATED ORAL at 09:04

## 2019-04-15 RX ADMIN — ARIPIPRAZOLE 5 MG: 5 TABLET ORAL at 09:04

## 2019-04-15 RX ADMIN — CARVEDILOL 6.25 MG: 6.25 TABLET, FILM COATED ORAL at 06:04

## 2019-04-15 RX ADMIN — CARVEDILOL 6.25 MG: 6.25 TABLET, FILM COATED ORAL at 09:04

## 2019-04-15 RX ADMIN — METRONIDAZOLE 500 MG: 500 TABLET ORAL at 09:04

## 2019-04-15 RX ADMIN — CEFTRIAXONE 2 G: 2 INJECTION, SOLUTION INTRAVENOUS at 01:04

## 2019-04-15 RX ADMIN — ESCITALOPRAM OXALATE 15 MG: 5 TABLET, FILM COATED ORAL at 09:04

## 2019-04-15 RX ADMIN — PIPERACILLIN AND TAZOBACTAM 4.5 G: 4; .5 INJECTION, POWDER, LYOPHILIZED, FOR SOLUTION INTRAVENOUS; PARENTERAL at 04:04

## 2019-04-15 RX ADMIN — VANCOMYCIN HYDROCHLORIDE 1000 MG: 1 INJECTION, POWDER, FOR SOLUTION INTRAVENOUS at 02:04

## 2019-04-15 RX ADMIN — METRONIDAZOLE 500 MG: 500 TABLET ORAL at 01:04

## 2019-04-15 RX ADMIN — FAMOTIDINE 20 MG: 20 TABLET ORAL at 09:04

## 2019-04-15 RX ADMIN — RAMELTEON 8 MG: 8 TABLET, FILM COATED ORAL at 09:04

## 2019-04-15 NOTE — PHYSICIAN QUERY
PT Name: Keira Starkey  MR #: [id number]    Physician Query Form -Integumentary  Clarification     CDS/: Maty Krause               Contact information: [phone]    This form is a permanent document in the medical record.     Query Date: April 15, 2019    By submitting this query, we are merely seeking further clarification of documentation. Please utilize your independent clinical judgment when addressing the question(s) below.    The Medical record contains the following:   Indicator  Supporting Clinical Findings Location in Medical Record    Redness      x Decubitus, Pressure Ulcer, etc.  Small pressure injury noted to sacrum.          Pressure Injury 4/10/19 2300 lower Sacral spine Stage 2   Date First Assessed/Time First Assessed: 4/10/19 2300 Pressure Injury  Present on Admissions: yes  Orientation: lower Location: Sacral spine Staging: Stage 2  Registered Nurse Nursing Notes   File Time: 4/11/2019 12:05 AM    LDAs on Admit 4/10/2019    Deep Tissue Injury      Wound Care Consult      Medication      x Treatment  New hydrocolloid applied       Registered Nurse Nursing Notes   File Time: 4/11/2019 12:05 AM     Other       National Pressure Ulcer Advisory Panel (2007) Pressure Ulcer Definitions & Stages:    Stage I:  Intact skin with non-blanchable redness of a localized area usually over a bony prominence.   Stage II:  Partial thickness loss of dermis presenting as a shallow open ulcer with a red pink wound bed, without slough.   Stage III: Full thickness tissue loss. Subcutaneous fat may be visible but bone, tendon or muscle is not exposed. Slough may be present but does not obscure the depth of tissue loss. May include undermining and tunneling.   Stage IV: Full thickness tissue loss with exposed bone, tendon or muscle. Slough or eschar may be present on some parts of the wound bed. Often include undermining and tunneling.   Unstageable:   Full thickness tissue loss in which the base of the ulcer  is covered by slough and/or eschar in the wound bed. Until enough slough and/or eschar is removed to expose the base of the wound, the true depth, and therefore stage, cannot be determined.   Deep Tissue Injury: Purple or maroon localized area of discolored intact skin or blood-filled blister due to damage of underlying soft tissue from  pressure and/or shear.     Provider, please specify the diagnosis or diagnoses associated with above clinical findings.  [ x ] Decubitus (Pressure) Ulcer / Deep Tissue Injury   (please specify site, laterality, stage, and POA status)    SITE LATERALITY STAGE PRESENT ON ADMISSION?    [ x ]  sacrum   [ x ] yes  [  ] no  [  ] unknown    [ [   ] ] clinically undetermined          [  ] Other Integumentary Diagnosis (please specify): _________      [ [   ] ] Clinically Undetermined                Please document in your progress notes daily for the duration of treatment until resolved and include in your discharge summary.

## 2019-04-15 NOTE — SUBJECTIVE & OBJECTIVE
Past Medical History:   Diagnosis Date    Hypertension        Past Surgical History:   Procedure Laterality Date    DEBRIDEMENT, FOOT, BONE BIOPSY, WOUND VAC PLACEMENT Left 4/12/2019    Performed by Warner Kramer DPM at [facility] OR       Review of patient's allergies indicates:  No Known Allergies    Medications:  Medications Prior to Admission   Medication Sig    ARIPiprazole (ABILIFY) 15 MG Tab Take 15 mg by mouth nightly.     ascorbic acid, vitamin C, (VITAMIN C) 100 MG tablet Take 500 mg by mouth once daily.     carvedilol (COREG) 6.25 MG tablet Take 6.25 mg by mouth 2 (two) times daily with meals.    donepezil (ARICEPT) 10 MG tablet Take 10 mg by mouth every evening.    escitalopram oxalate (LEXAPRO) 10 MG tablet Take 15 mg by mouth once daily.    fluticasone (FLONASE) 50 mcg/actuation nasal spray 1 spray by Each Nare route once daily.    multivitamin capsule Take 1 capsule by mouth once daily.    ranitidine (ZANTAC) 150 MG tablet Take 150 mg by mouth once daily.     traZODone (DESYREL) 50 MG tablet Take 25 mg by mouth every evening.    vitamin D (VITAMIN D3) 1000 units Tab Take 1,000 Units by mouth once daily.    zinc sulfate (ZINCATE) 220 (50) mg capsule Take 220 mg by mouth once daily.     Antibiotics (From admission, onward)    Start     Stop Route Frequency Ordered    04/15/19 1400  metroNIDAZOLE tablet 500 mg      -- Oral Every 8 hours 04/15/19 1126    04/15/19 1230  cefTRIAXone (ROCEPHIN) 2 g in dextrose 5 % 50 mL IVPB      -- IV Every 24 hours (non-standard times) 04/15/19 1126        Antifungals (From admission, onward)    None        Antivirals (From admission, onward)    None             There is no immunization history on file for this patient.    Family History     None        Social History     Socioeconomic History    Marital status:      Spouse name: Not on file    Number of children: Not on file    Years of education: Not on file    Highest education level: Not on file    Occupational History    Not on file   Social Needs    Financial resource strain: Not on file    Food insecurity:     Worry: Not on file     Inability: Not on file    Transportation needs:     Medical: Not on file     Non-medical: Not on file   Tobacco Use    Smoking status: Never Smoker   Substance and Sexual Activity    Alcohol use: Not Currently    Drug use: Not on file    Sexual activity: Not on file   Lifestyle    Physical activity:     Days per week: Not on file     Minutes per session: Not on file    Stress: Not on file   Relationships    Social connections:     Talks on phone: Not on file     Gets together: Not on file     Attends Zoroastrianism service: Not on file     Active member of club or organization: Not on file     Attends meetings of clubs or organizations: Not on file     Relationship status: Not on file   Other Topics Concern    Not on file   Social History Narrative    Not on file     Review of Systems   Constitutional: Negative for chills and fever.   Respiratory: Negative for cough and shortness of breath.    Cardiovascular: Negative for chest pain and leg swelling.   Genitourinary: Negative for difficulty urinating, dysuria, flank pain and hematuria.   Musculoskeletal: Negative for arthralgias, joint swelling and myalgias.   Skin: Positive for wound. Negative for color change.     Objective:     Vital Signs (Most Recent):  Temp: 97.9 °F (36.6 °C) (04/15/19 1057)  Pulse: 76 (04/15/19 1057)  Resp: 18 (04/15/19 1057)  BP: (!) 147/63 (04/15/19 1057)  SpO2: 98 % (04/15/19 1057) Vital Signs (24h Range):  Temp:  [97.9 °F (36.6 °C)-98.6 °F (37 °C)] 97.9 °F (36.6 °C)  Pulse:  [54-76] 76  Resp:  [18-20] 18  SpO2:  [96 %-99 %] 98 %  BP: (134-152)/(60-80) 147/63     Weight: 75.1 kg (165 lb 9.6 oz)  Body mass index is 26.73 kg/m².    Estimated Creatinine Clearance: 33.6 mL/min (A) (based on SCr of 1.5 mg/dL (H)).    Physical Exam   Constitutional: She appears well-developed and well-nourished. No  distress.   HENT:   Head: Normocephalic and atraumatic.   Right Ear: External ear normal.   Left Ear: External ear normal.   Nose: Nose normal.   Eyes: Right eye exhibits no discharge. Left eye exhibits no discharge.   Neck: Normal range of motion.   Cardiovascular: Normal rate, regular rhythm, normal heart sounds and intact distal pulses. Exam reveals no gallop and no friction rub.   No murmur heard.  Pulmonary/Chest: Effort normal and breath sounds normal. No stridor. No respiratory distress. She has no wheezes. She has no rales. She exhibits no tenderness.   Abdominal: Soft. Bowel sounds are normal. She exhibits no distension. There is no tenderness.   Musculoskeletal: Normal range of motion. She exhibits no edema.   Neurological: She is alert.   Oriented to person, place, and situation   Skin: She is not diaphoretic.   Wound debrided, dressing in place. Wound vac in place.   Nursing note and vitals reviewed.      Significant Labs:   Blood Culture: No results for input(s): LABBLOO in the last 4320 hours.  BMP:   Recent Labs   Lab 04/15/19  0416   *      K 3.8      CO2 24   BUN 19   CREATININE 1.5*   CALCIUM 9.6     CBC:   Recent Labs   Lab 04/14/19 0246 04/15/19  0416   WBC 8.06 7.80   HGB 9.5* 9.4*   HCT 31.3* 31.3*    303     CMP:   Recent Labs   Lab 04/14/19 0246 04/15/19  0416    138   K 4.2 3.8    107   CO2 19* 24   GLU 65* 118*   BUN 23 19   CREATININE 1.8* 1.5*   CALCIUM 9.6 9.6   ANIONGAP 12 7*   EGFRNONAA 27* 34*     Microbiology Results (last 7 days)     Procedure Component Value Units Date/Time    Culture, Anaerobic [[id number]] Collected:  04/12/19 1250    Order Status:  Completed Specimen:  Wound from Toe, Left Foot Updated:  04/15/19 1349     Anaerobic Culture --     FINEGOLDIA MAGNA  Moderate      Narrative:       LEFT FOOT CALCANEUS    Culture, Anaerobic [[id number]] Collected:  04/12/19 1250    Order Status:  Completed Specimen:  Wound from Foot, Left  Updated:  04/15/19 1347     Anaerobic Culture --     FINEGOLDIA MAGNA  Moderate      Narrative:       LEFT HEEL    AFB Culture & Smear [[id number]] Collected:  04/12/19 1250    Order Status:  Completed Specimen:  Wound from Toe, Left Foot Updated:  04/14/19 2127     AFB Culture & Smear Culture in progress    Narrative:       LEFT FOOT CALCANEUS    AFB Culture & Smear [[id number]] Collected:  04/12/19 1250    Order Status:  Completed Specimen:  Wound from Foot, Left Updated:  04/14/19 2127     AFB Culture & Smear Culture in progress    Narrative:       LEFT HEEL    Aerobic culture [[id number]]  (Susceptibility) Collected:  04/12/19 1250    Order Status:  Completed Specimen:  Wound from Toe, Left Foot Updated:  04/14/19 1543     Aerobic Bacterial Culture --     PROTEUS MIRABILIS  Few      Narrative:       LEFT FOOT CALCANEUS    Aerobic culture [[id number]]  (Susceptibility) Collected:  04/12/19 1250    Order Status:  Completed Specimen:  Wound from Foot, Left Updated:  04/14/19 1542     Aerobic Bacterial Culture --     PROTEUS MIRABILIS  Moderate      Narrative:       LEFT HEEL    Gram stain [[id number]] Collected:  04/12/19 1250    Order Status:  Completed Specimen:  Wound from Toe, Left Foot Updated:  04/13/19 0917     Gram Stain Result Few WBC's      Rare Gram negative rods      Few Gram positive cocci    Narrative:       LEFT FOOT CALCANEUS    Gram stain [[id number]] Collected:  04/12/19 1250    Order Status:  Completed Specimen:  Wound from Foot, Left Updated:  04/13/19 0915     Gram Stain Result Rare WBC's      Rare Gram negative rods    Narrative:       LEFT HEEL    Fungus culture [[id number]] Collected:  04/12/19 1250    Order Status:  Sent Specimen:  Wound from Foot, Left Updated:  04/12/19 1448    Fungus culture [[id number]] Collected:  04/12/19 1250    Order Status:  Sent Specimen:  Wound from Toe, Left Foot Updated:  04/12/19 1437    Aerobic culture (Specify Source) **CANNOT BE ORDERED AS STAT** [[id number]]   (Susceptibility) Collected:  04/10/19 1715    Order Status:  Completed Specimen:  Wound from Foot, Left Updated:  04/12/19 0907     Aerobic Bacterial Culture --     PROTEUS MIRABILIS  Many      Aerobic culture [[id number]]     Order Status:  No result Specimen:  Wound from Foot, Left         Wound Culture:   Recent Labs   Lab 04/10/19  1715 04/12/19  1250   LABAERO PROTEUS MIRABILIS  Many   PROTEUS MIRABILIS  Few    PROTEUS MIRABILIS  Moderate       All pertinent labs within the past 24 hours have been reviewed.    Significant Imaging: I have reviewed all pertinent imaging results/findings within the past 24 hours.

## 2019-04-15 NOTE — ASSESSMENT & PLAN NOTE
Chronic heal ulcer with worsening and now suspected acute osteomyelitis. Appreciate podiatry's recs. Empirically started on Vanc and  Zosyn on admission. Debridement and bone biopsy 4/12/2019. Bone biopsy pending. Cultures with Proteus mirabilis. ID following.

## 2019-04-15 NOTE — PLAN OF CARE
Problem: Adult Inpatient Plan of Care  Goal: Plan of Care Review  Outcome: Ongoing (interventions implemented as appropriate)  Pt has denied pain or needs this shift. Bed is locked and low with call light within reach. Avasys in use. Bed alarm on and audible. Pt has remained free from falls or injuries this shift. Pt does have a slow response but is able to answer questions appropriately.

## 2019-04-15 NOTE — ASSESSMENT & PLAN NOTE
Mrs. Keira Starkey is a 74 y.o. female [facility] resident with essential hypertension, CKD stage 3, anemia chronic disease, GERD, dementia, and depression who presents to [facility] ED with complaints of left heel infection. MRI unable to exclude osteo.  ESR, CRP-elevated.  LUCIO -no hemodynamicly significat stenosis.    Podiatry evaluated the patient. She went for debridement and bone biopsy 4/12/2019. Cx growing Proteus mirabilis, one cx resistant to fluorquinolones.    - Recommend discontinuing Vanc and Zosyn  -Start pt on Ceftriaxone IV for Proteus Mirabilis and Flagyl PO as anaerobic cx still pending  - Anticipate pt will need 6 weeks of Abx for osteo  - ESR, CRP, CBC, CMP, and Vanc trough need to be drawn weekly as an outpatient with results faxed to the ID Department [phone]  -Pt will need ID f/u in 2-3 weeks  -ID will sign off

## 2019-04-15 NOTE — HPI
Mrs. Keira Starkey is a 74 y.o. female [facility] resident with essential hypertension, CKD stage 3, anemia chronic disease, GERD, dementia, and depression who presents to [facility] ED with complaints of left heel infection.  NH staff was concerned about the possibility of osteomyelitis and would like her to be evaluated.  Further history is otherwise limited at this time.  ESR, CRP-elevated.  LUCIO -no hemodynamicly significat stenosis.  MRI w/ Osseous edema within the posterior calcaneus which may be secondary to reactive edema in this patient with posterior heel ulceration, although potential early developing osteomyelitis not excluded. She was not septic but was started empirically on antibiotics on admission. Podiatry evaluated the patient. Arterial studies showed adequate flow. She went for debridement and bone biopsy 4/12/2019. She tolerated the procedure well. Cultures growing Proteus mirabilis. Bone biopsy pending.

## 2019-04-15 NOTE — PROCEDURES
"Keira Starkey is a 74 y.o. female patient.    Temp: 98.3 °F (36.8 °C) (04/15/19 1629)  Pulse: (!) 51 (04/15/19 1629)  Resp: 18 (04/15/19 1629)  BP: (!) 144/63 (04/15/19 1629)  SpO2: 97 % (04/15/19 1629)  Weight: 75.1 kg (165 lb 9.6 oz) (04/10/19 2308)  Height: 5' 6" (167.6 cm) (04/10/19 2308)    PICC  Date/Time: 4/15/2019 6:15 PM  Performed by: Lavon Casas RN  Consent Done: Yes  Time out: Immediately prior to procedure a time out was called to verify the correct patient, procedure, equipment, support staff and site/side marked as required  Indications: med administration and vascular access  Anesthesia: local infiltration  Local anesthetic: lidocaine 1% without epinephrine  Anesthetic Total (mL): 2  Preparation: skin prepped with ChloraPrep  Skin prep agent dried: skin prep agent completely dried prior to procedure  Sterile barriers: all five maximum sterile barriers used - cap, mask, sterile gown, sterile gloves, and large sterile sheet  Hand hygiene: hand hygiene performed prior to central venous catheter insertion  Location details: right basilic  Catheter type: double lumen  Catheter size: 5 Fr  Catheter Length: 40 (2cm external)cm    Ultrasound guidance: yes  Vessel Caliber: medium, compressibility normal  Needle advanced into vessel with real time Ultrasound guidance.  Guidewire confirmed in vessel.  Sterile sheath used.  Number of attempts: 1  Post-procedure: blood return through all ports, chlorhexidine patch and sterile dressing applied            Lavon Casas  4/15/2019  "

## 2019-04-15 NOTE — CONSULTS
[facility]  Infectious Disease  Consult Note    Patient Name: Keira Starkey  MRN: [id number]  Admission Date: 4/10/2019  Hospital Length of Stay: 5 days  Attending Physician: Mimi Pace MD  Primary Care Provider: Primary Doctor No     Isolation Status: No active isolations      Inpatient consult to Infectious Diseases  Consult performed by: Ezequiel Lundberg PA-C  Consult ordered by: Mimi Pace MD      ID consult received. Chart being reviewed. Full consult note with recommendations to follow.      Thank you,  Ezequiel Lundberg PA-C  [id number]

## 2019-04-15 NOTE — PROGRESS NOTES
"[facility] Hospital Medicine  Progress Note    Patient Name: Keira Starkey  MRN: [id number]  Patient Class: IP- Inpatient   Admission Date: 4/10/2019  Length of Stay: 5 days  Attending Physician: Mimi Pace MD  Primary Care Provider: Primary Doctor No        Subjective:     Principal Problem:Acute hematogenous osteomyelitis of left foot    HPI:  Mrs. Keira Starkey is a 74 y.o. female [facility] resident with essential hypertension, CKD stage 3, anemia chronic disease, GERD, dementia, and depression who presents to [facility] ED with complaints of left heel infection.  NH staff was concerned about the possibility of osteomyelitis and would like her to be evaluated.  Further history is otherwise limited at this time.    Hospital Course:  Ms. Starkey was admitted for a left heal wound with imaging concerning for osteomyelitis. Xray and MRI of the foot were both borderline with MRI read as, "Osseous edema within the posterior calcaneus which may be secondary to reactive edema in this patient with posterior heel ulceration, although potential early developing osteomyelitis not excluded." She was not septic but was started empirically on antibiotics on admission. Podiatry evaluated the patient. Arterial studies showed adequate flow. She went for debridement and bone biopsy 4/12/2019. She tolerated the procedure well. Cultures growing Proteus mirabilis. Bone biopsy pending. ID following. Final abx recs pending.    Interval History:  Doing well. Denies pain. Regular BMs. Eating most of her meal trays.    Review of Systems   Constitutional: Negative for chills and fever.   Respiratory: Negative for cough and shortness of breath.    Cardiovascular: Negative for chest pain and leg swelling.     Objective:     Vital Signs (Most Recent):  Temp: 98.4 °F (36.9 °C) (04/15/19 0710)  Pulse: 69 (04/15/19 0710)  Resp: 18 (04/15/19 0710)  BP: (!) 152/80 (04/15/19 0710)  SpO2: 96 % (04/15/19 0710) Vital " Signs (24h Range):  Temp:  [98 °F (36.7 °C)-98.6 °F (37 °C)] 98.4 °F (36.9 °C)  Pulse:  [54-69] 69  Resp:  [18-20] 18  SpO2:  [96 %-99 %] 96 %  BP: (124-152)/(58-80) 152/80     Weight: 75.1 kg (165 lb 9.6 oz)  Body mass index is 26.73 kg/m².    Physical Exam   Constitutional: She appears well-developed and well-nourished. No distress.   HENT:   Head: Normocephalic and atraumatic.   Right Ear: External ear normal.   Left Ear: External ear normal.   Nose: Nose normal.   Eyes: Right eye exhibits no discharge. Left eye exhibits no discharge.   Neck: Normal range of motion.   Cardiovascular: Normal rate, regular rhythm, normal heart sounds and intact distal pulses. Exam reveals no gallop and no friction rub.   No murmur heard.  Pulmonary/Chest: Effort normal and breath sounds normal. No stridor. No respiratory distress. She has no wheezes. She has no rales. She exhibits no tenderness.   Abdominal: Soft. Bowel sounds are normal. She exhibits no distension. There is no tenderness.   Musculoskeletal: Normal range of motion. She exhibits no edema.   Neurological: She is alert.   Oriented to person, place, and situation   Skin: She is not diaphoretic.   Wound debrided, dressing in place. Wound vac in place.   Nursing note and vitals reviewed.          Significant Labs: All pertinent labs within the past 24 hours have been reviewed.      Assessment/Plan:      * Acute hematogenous osteomyelitis of left foot  Chronic heal ulcer with worsening and now suspected acute osteomyelitis. Appreciate podiatry's recs. Empirically started on Vanc and  Zosyn on admission. Debridement and bone biopsy 4/12/2019. Bone biopsy pending. Cultures with Proteus mirabilis. ID following.    Chronic heel ulcer, left, with necrosis of bone  See above    Chronic heel ulcer, left, with necrosis of muscle  See above    Depression  Stable; will continue her home regimen of escitalopram.    Dementia with behavioral disturbance  Stable; will continue home  regimen of donepezil.    GERD (gastroesophageal reflux disease)  Will substitute her home regimen of ranitidine for famotidine while inpatient.    Anemia of chronic disease  The patient's H/H is stable and consistent with previous laboratory measurements, and the patient exhibits no signs or symptoms of acute bleeding; there is no indication for transfusion.  Will continue to monitor.    CKD stage 3  Cr stable at baseline around 1.4-1.6.     Essential hypertension  Patient's blood pressure is fairly-controlled; will continue home regimen of carvedilol and provide as-needed clonidine.    Open wound of left heel  See above      VTE Risk Mitigation (From admission, onward)        Ordered     IP VTE HIGH RISK PATIENT  Once      04/11/19 0056     Place PAUL hose  Until discontinued      04/11/19 0056     Place sequential compression device  Until discontinued      04/11/19 0056              Mimi Pace MD  Department of Hospital Medicine   [facility]

## 2019-04-15 NOTE — PLAN OF CARE
[facility]  [address] [place] [address]    Facility Transfer Orders                        04/16/2019    Admit to: SNF    Diagnoses:  Active Hospital Problems    Diagnosis  POA    *Acute hematogenous osteomyelitis of left foot [M86.072]  No    Essential hypertension [I10]  Yes     Chronic    CKD stage 3 [N18.3]  Yes     Chronic    Anemia of chronic disease [D63.8]  Yes     Chronic    GERD (gastroesophageal reflux disease) [K21.9]  Yes     Chronic    Dementia with behavioral disturbance [F03.91]  Yes     Chronic    Depression [F32.9]  Yes     Chronic    Chronic heel ulcer, left, with necrosis of muscle [L97.423]  Yes    Chronic heel ulcer, left, with necrosis of bone [L97.424]  Yes     Chronic    Open wound of left heel [S91.302A]  Yes      Resolved Hospital Problems   No resolved problems to display.       Allergies:Review of patient's allergies indicates:  No Known Allergies    Vitals: Every shift (Skilled Nursing patients)    Diet: Regular     Activity: Turn Q2H     Nursing Precautions:     - Aspiration precautions:             - Total assistance with meals            -  Upright 90 degrees befor during and after meals             -  Suction at bedside          - Fall precautions   - Seizure precaution   - Decubitus precautions:        -  for positioning   - Pressure reducing foam mattress   - Turn patient every two hours. Use wedge pillows to anchor patient        Follow up with ID Clinic in 2-3 weeks and podiatry in 1 week.       CONSULTS:     PT to evaluate and treat - five times a week     OT to evaluate and treat - five times a week        LABS:  Weekly CBC, CMP, ESR, CRP starting 4/22/2019 to be faxed to ID Clinic at [phone].      HOME INFUSION THERAPY:   SN to perform Infusion Therapy/Central Line Care.  Review Central Line Care & Central Line Flush with patient.    Administer (drug and dose): Ceftriaxone 2g IV daily until 5/23/2019  Last dose given:  4/16/2019                         Home dose due: 4/17/2019    Scrub the Hub: Prior to accessing the line, always perform a 30 second alcohol scrub  Each lumen of the central line is to be flushed at least daily with 10 mL Normal Saline and 3 mL Heparin flush (100 units/mL)  Skilled Nurse (SN) may draw blood from IV access  Blood Draw Procedure:   - Aspirate at least 5 mL of blood   - Discard   - Obtain specimen   - Change posiflow cap   - Flush with 20 mL Normal Saline followed by a                 3-5 mL Heparin flush (100 units/mL)  Central :   - Sterile dressing changes are done weekly and as needed.   - Use chlor-hexadine scrub to cleanse site, apply Biopatch to insertion site,       apply securement device dressing   - Posi-flow caps are changed weekly and after EVERY lab draw.   - If sterile gauze is under dressing to control oozing,                 dressing change must be performed every 24 hours until gauze is not needed.  SN TO REMOVE PICC AFTER LAST DOSE OF IV ANTIBIOTICS IS COMPLETED.        WOUND CARE:    Wound VAC to left heel ulceration. Wound vac change every 3 days. Set to 75 mmhg  Next VAC change on 4/17/19.     Wound care orders below:     Left foot- to be done every 3 days  1. Cleanse wound with saline. Pat dry  2. Apply VAC to left heel  3. Set to 75mmhg  4. Wrap with cast padding, ACE     Right foot- to be done every 3 days  1. Cleanse wound with saline. Pat dry  2. Apply betadine to site. Cover with non adherent foam  3. Wrap with cast padding.           Medications: Discontinue all previous medication orders, if any. See new list below.     Keira Starkey   Home Medication Instructions LUDWIN:[id number]    Printed on:04/15/19 0517   Medication Information                      ARIPiprazole (ABILIFY) 15 MG Tab  Take 15 mg by mouth nightly.              ascorbic acid, vitamin C, (VITAMIN C) 100 MG tablet  Take 500 mg by mouth once daily.              carvedilol (COREG) 6.25 MG  tablet  Take 6.25 mg by mouth 2 (two) times daily with meals.             cefTRIAXone 2 g in dextrose 5 % 50 mL (ROCEPHIN) 2 g/50 mL PgBk IVPB  Inject 50 mLs (2 g total) into the vein once daily until 5/23/2019             donepezil (ARICEPT) 10 MG tablet  Take 10 mg by mouth every evening.             escitalopram oxalate (LEXAPRO) 10 MG tablet  Take 15 mg by mouth once daily.             fluticasone (FLONASE) 50 mcg/actuation nasal spray  1 spray by Each Nare route once daily.             metroNIDAZOLE (FLAGYL) 500 MG tablet  Take 1 tablet (500 mg total) by mouth every 8 (eight) hours until 5/23/2019             multivitamin capsule  Take 1 capsule by mouth once daily.             ranitidine (ZANTAC) 150 MG tablet  Take 150 mg by mouth once daily.              traZODone (DESYREL) 50 MG tablet  Take 25 mg by mouth every evening.             vitamin D (VITAMIN D3) 1000 units Tab  Take 1,000 Units by mouth once daily.             zinc sulfate (ZINCATE) 220 (50) mg capsule  Take 220 mg by mouth once daily.                       _________________________________  Mimi Pace MD  04/15/2019

## 2019-04-15 NOTE — NURSING
Pt has denied pain or needs this shift. Bed is locked and low with call light within reach. Avasys in use. Bed alarm on and audible. Pt has remained free from falls or injuries this shift. Pt does have a slow response but is able to answer questions appropriately.

## 2019-04-15 NOTE — CONSULTS
[facility]  Infectious Disease  Consult Note    Patient Name: Keira Starkey  MRN: [id number]  Admission Date: 4/10/2019  Hospital Length of Stay: 5 days  Attending Physician: Mimi Pace MD  Primary Care Provider: Primary Doctor No     Isolation Status: No active isolations    Patient information was obtained from patient, past medical records and ER records.      Consults  Assessment/Plan:     Chronic heel ulcer, left, with necrosis of muscle  Mrs. Keira Starkey is a 74 y.o. female [facility] resident with essential hypertension, CKD stage 3, anemia chronic disease, GERD, dementia, and depression who presents to [facility] ED with complaints of left heel infection. MRI unable to exclude osteo.  ESR, CRP-elevated.  LUCIO -no hemodynamicly significat stenosis.    Podiatry evaluated the patient. She went for debridement and bone biopsy 4/12/2019. Cx growing Proteus mirabilis, one cx resistant to fluorquinolones.    - Recommend discontinuing Vanc and Zosyn  -Start pt on Ceftriaxone IV for Proteus Mirabilis and Flagyl PO as anaerobic cx still pending  - Anticipate pt will need 6 weeks of Abx for osteo  - ESR, CRP, CBC, CMP, and Vanc trough need to be drawn weekly as an outpatient with results faxed to the ID Department [phone]  -Pt will need ID f/u in 2-3 weeks  -ID will sign off        Thank you for your consult. I will sign off. Please contact us if you have any additional questions.    Ezequiel Lundberg PA-C  Infectious Disease  [facility]    Subjective:     Principal Problem: Acute hematogenous osteomyelitis of left foot    HPI: Mrs. Keira Starkey is a 74 y.o. female [facility] resident with essential hypertension, CKD stage 3, anemia chronic disease, GERD, dementia, and depression who presents to [facility] ED with complaints of left heel infection.  NH staff was concerned about the possibility of osteomyelitis and would like her to be evaluated.  Further  history is otherwise limited at this time.  ESR, CRP-elevated.  LUCIO -no hemodynamicly significat stenosis.  MRI w/ Osseous edema within the posterior calcaneus which may be secondary to reactive edema in this patient with posterior heel ulceration, although potential early developing osteomyelitis not excluded. She was not septic but was started empirically on antibiotics on admission. Podiatry evaluated the patient. Arterial studies showed adequate flow. She went for debridement and bone biopsy 4/12/2019. She tolerated the procedure well. Cultures growing Proteus mirabilis. Bone biopsy pending.        Past Medical History:   Diagnosis Date    Hypertension        Past Surgical History:   Procedure Laterality Date    DEBRIDEMENT, FOOT, BONE BIOPSY, WOUND VAC PLACEMENT Left 4/12/2019    Performed by Warner Kramer DPM at [facility] OR       Review of patient's allergies indicates:  No Known Allergies    Medications:  Medications Prior to Admission   Medication Sig    ARIPiprazole (ABILIFY) 15 MG Tab Take 15 mg by mouth nightly.     ascorbic acid, vitamin C, (VITAMIN C) 100 MG tablet Take 500 mg by mouth once daily.     carvedilol (COREG) 6.25 MG tablet Take 6.25 mg by mouth 2 (two) times daily with meals.    donepezil (ARICEPT) 10 MG tablet Take 10 mg by mouth every evening.    escitalopram oxalate (LEXAPRO) 10 MG tablet Take 15 mg by mouth once daily.    fluticasone (FLONASE) 50 mcg/actuation nasal spray 1 spray by Each Nare route once daily.    multivitamin capsule Take 1 capsule by mouth once daily.    ranitidine (ZANTAC) 150 MG tablet Take 150 mg by mouth once daily.     traZODone (DESYREL) 50 MG tablet Take 25 mg by mouth every evening.    vitamin D (VITAMIN D3) 1000 units Tab Take 1,000 Units by mouth once daily.    zinc sulfate (ZINCATE) 220 (50) mg capsule Take 220 mg by mouth once daily.     Antibiotics (From admission, onward)    Start     Stop Route Frequency Ordered    04/15/19 1400   metroNIDAZOLE tablet 500 mg      -- Oral Every 8 hours 04/15/19 1126    04/15/19 1230  cefTRIAXone (ROCEPHIN) 2 g in dextrose 5 % 50 mL IVPB      -- IV Every 24 hours (non-standard times) 04/15/19 1126        Antifungals (From admission, onward)    None        Antivirals (From admission, onward)    None             There is no immunization history on file for this patient.    Family History     None        Social History     Socioeconomic History    Marital status:      Spouse name: Not on file    Number of children: Not on file    Years of education: Not on file    Highest education level: Not on file   Occupational History    Not on file   Social Needs    Financial resource strain: Not on file    Food insecurity:     Worry: Not on file     Inability: Not on file    Transportation needs:     Medical: Not on file     Non-medical: Not on file   Tobacco Use    Smoking status: Never Smoker   Substance and Sexual Activity    Alcohol use: Not Currently    Drug use: Not on file    Sexual activity: Not on file   Lifestyle    Physical activity:     Days per week: Not on file     Minutes per session: Not on file    Stress: Not on file   Relationships    Social connections:     Talks on phone: Not on file     Gets together: Not on file     Attends Yarsanism service: Not on file     Active member of club or organization: Not on file     Attends meetings of clubs or organizations: Not on file     Relationship status: Not on file   Other Topics Concern    Not on file   Social History Narrative    Not on file     Review of Systems   Constitutional: Negative for chills and fever.   Respiratory: Negative for cough and shortness of breath.    Cardiovascular: Negative for chest pain and leg swelling.   Genitourinary: Negative for difficulty urinating, dysuria, flank pain and hematuria.   Musculoskeletal: Negative for arthralgias, joint swelling and myalgias.   Skin: Positive for wound. Negative for color  change.     Objective:     Vital Signs (Most Recent):  Temp: 97.9 °F (36.6 °C) (04/15/19 1057)  Pulse: 76 (04/15/19 1057)  Resp: 18 (04/15/19 1057)  BP: (!) 147/63 (04/15/19 1057)  SpO2: 98 % (04/15/19 1057) Vital Signs (24h Range):  Temp:  [97.9 °F (36.6 °C)-98.6 °F (37 °C)] 97.9 °F (36.6 °C)  Pulse:  [54-76] 76  Resp:  [18-20] 18  SpO2:  [96 %-99 %] 98 %  BP: (134-152)/(60-80) 147/63     Weight: 75.1 kg (165 lb 9.6 oz)  Body mass index is 26.73 kg/m².    Estimated Creatinine Clearance: 33.6 mL/min (A) (based on SCr of 1.5 mg/dL (H)).    Physical Exam   Constitutional: She appears well-developed and well-nourished. No distress.   HENT:   Head: Normocephalic and atraumatic.   Right Ear: External ear normal.   Left Ear: External ear normal.   Nose: Nose normal.   Eyes: Right eye exhibits no discharge. Left eye exhibits no discharge.   Neck: Normal range of motion.   Cardiovascular: Normal rate, regular rhythm, normal heart sounds and intact distal pulses. Exam reveals no gallop and no friction rub.   No murmur heard.  Pulmonary/Chest: Effort normal and breath sounds normal. No stridor. No respiratory distress. She has no wheezes. She has no rales. She exhibits no tenderness.   Abdominal: Soft. Bowel sounds are normal. She exhibits no distension. There is no tenderness.   Musculoskeletal: Normal range of motion. She exhibits no edema.   Neurological: She is alert.   Oriented to person, place, and situation   Skin: She is not diaphoretic.   Wound debrided, dressing in place. Wound vac in place.   Nursing note and vitals reviewed.      Significant Labs:   Blood Culture: No results for input(s): LABBLOO in the last 4320 hours.  BMP:   Recent Labs   Lab 04/15/19  0416   *      K 3.8      CO2 24   BUN 19   CREATININE 1.5*   CALCIUM 9.6     CBC:   Recent Labs   Lab 04/14/19  0246 04/15/19  0416   WBC 8.06 7.80   HGB 9.5* 9.4*   HCT 31.3* 31.3*    303     CMP:   Recent Labs   Lab 04/14/19 0246  04/15/19  0416    138   K 4.2 3.8    107   CO2 19* 24   GLU 65* 118*   BUN 23 19   CREATININE 1.8* 1.5*   CALCIUM 9.6 9.6   ANIONGAP 12 7*   EGFRNONAA 27* 34*     Microbiology Results (last 7 days)     Procedure Component Value Units Date/Time    Culture, Anaerobic [[id number]] Collected:  04/12/19 1250    Order Status:  Completed Specimen:  Wound from Toe, Left Foot Updated:  04/15/19 1349     Anaerobic Culture --     FINEGOLDIA MAGNA  Moderate      Narrative:       LEFT FOOT CALCANEUS    Culture, Anaerobic [[id number]] Collected:  04/12/19 1250    Order Status:  Completed Specimen:  Wound from Foot, Left Updated:  04/15/19 1347     Anaerobic Culture --     FINEGOLDIA MAGNA  Moderate      Narrative:       LEFT HEEL    AFB Culture & Smear [[id number]] Collected:  04/12/19 1250    Order Status:  Completed Specimen:  Wound from Toe, Left Foot Updated:  04/14/19 2127     AFB Culture & Smear Culture in progress    Narrative:       LEFT FOOT CALCANEUS    AFB Culture & Smear [[id number]] Collected:  04/12/19 1250    Order Status:  Completed Specimen:  Wound from Foot, Left Updated:  04/14/19 2127     AFB Culture & Smear Culture in progress    Narrative:       LEFT HEEL    Aerobic culture [[id number]]  (Susceptibility) Collected:  04/12/19 1250    Order Status:  Completed Specimen:  Wound from Toe, Left Foot Updated:  04/14/19 1543     Aerobic Bacterial Culture --     PROTEUS MIRABILIS  Few      Narrative:       LEFT FOOT CALCANEUS    Aerobic culture [[id number]]  (Susceptibility) Collected:  04/12/19 1250    Order Status:  Completed Specimen:  Wound from Foot, Left Updated:  04/14/19 1542     Aerobic Bacterial Culture --     PROTEUS MIRABILIS  Moderate      Narrative:       LEFT HEEL    Gram stain [[id number]] Collected:  04/12/19 1250    Order Status:  Completed Specimen:  Wound from Toe, Left Foot Updated:  04/13/19 0917     Gram Stain Result Few WBC's      Rare Gram negative rods      Few Gram positive cocci     Narrative:       LEFT FOOT CALCANEUS    Gram stain [[id number]] Collected:  04/12/19 1250    Order Status:  Completed Specimen:  Wound from Foot, Left Updated:  04/13/19 0915     Gram Stain Result Rare WBC's      Rare Gram negative rods    Narrative:       LEFT HEEL    Fungus culture [[id number]] Collected:  04/12/19 1250    Order Status:  Sent Specimen:  Wound from Foot, Left Updated:  04/12/19 1448    Fungus culture [[id number]] Collected:  04/12/19 1250    Order Status:  Sent Specimen:  Wound from Toe, Left Foot Updated:  04/12/19 1437    Aerobic culture (Specify Source) **CANNOT BE ORDERED AS STAT** [[id number]]  (Susceptibility) Collected:  04/10/19 1715    Order Status:  Completed Specimen:  Wound from Foot, Left Updated:  04/12/19 0907     Aerobic Bacterial Culture --     PROTEUS MIRABILIS  Many      Aerobic culture [[id number]]     Order Status:  No result Specimen:  Wound from Foot, Left         Wound Culture:   Recent Labs   Lab 04/10/19  1715 04/12/19  1250   LABAERO PROTEUS MIRABILIS  Many   PROTEUS MIRABILIS  Few    PROTEUS MIRABILIS  Moderate       All pertinent labs within the past 24 hours have been reviewed.    Significant Imaging: I have reviewed all pertinent imaging results/findings within the past 24 hours.

## 2019-04-15 NOTE — NURSING
Pt denied pain throughout last night. Pt is now tearful stating she is in pain. Tylenol given per order.

## 2019-04-15 NOTE — PLAN OF CARE
Patient will discharge back to [facility] with IVABX.        04/15/19 8396   Discharge Reassessment   Assessment Type Discharge Planning Reassessment   Provided patient/caregiver education on the expected discharge date and the discharge plan Yes   Do you have any problems affording any of your prescribed medications? Yes   Discharge Plan A Return to nursing home   Discharge Plan B Return to Nursing Home   Anticipated Discharge Disposition SNF   How does the patient rate their overall health at the present time? Good   Describe the patient's ability to walk at the present time. Does not walk or unable to take any steps at all   How often would a person be available to care for the patient? Whenever needed   Number of comorbid conditions (as recorded on the chart) Five or more   Post-Acute Status   Post-Acute Authorization Placement

## 2019-04-15 NOTE — PLAN OF CARE
Problem: Adult Inpatient Plan of Care  Goal: Plan of Care Review  Outcome: Ongoing (interventions implemented as appropriate)  Pt Is AAO x 3, able to make needs known, voids via bedpan, tolerating abx well, FC in place with good UOP, pt remains on bedrest per order, PICC line order in place per MD, pt has a good appetite eating % of meals, wounds to bilateral heels, and sacrum. Safety maintained, will cont to monitor.

## 2019-04-15 NOTE — SUBJECTIVE & OBJECTIVE
Interval History:  Doing well. Denies pain. Regular BMs. Eating most of her meal trays.    Review of Systems   Constitutional: Negative for chills and fever.   Respiratory: Negative for cough and shortness of breath.    Cardiovascular: Negative for chest pain and leg swelling.     Objective:     Vital Signs (Most Recent):  Temp: 98.4 °F (36.9 °C) (04/15/19 0710)  Pulse: 69 (04/15/19 0710)  Resp: 18 (04/15/19 0710)  BP: (!) 152/80 (04/15/19 0710)  SpO2: 96 % (04/15/19 0710) Vital Signs (24h Range):  Temp:  [98 °F (36.7 °C)-98.6 °F (37 °C)] 98.4 °F (36.9 °C)  Pulse:  [54-69] 69  Resp:  [18-20] 18  SpO2:  [96 %-99 %] 96 %  BP: (124-152)/(58-80) 152/80     Weight: 75.1 kg (165 lb 9.6 oz)  Body mass index is 26.73 kg/m².    Physical Exam   Constitutional: She appears well-developed and well-nourished. No distress.   HENT:   Head: Normocephalic and atraumatic.   Right Ear: External ear normal.   Left Ear: External ear normal.   Nose: Nose normal.   Eyes: Right eye exhibits no discharge. Left eye exhibits no discharge.   Neck: Normal range of motion.   Cardiovascular: Normal rate, regular rhythm, normal heart sounds and intact distal pulses. Exam reveals no gallop and no friction rub.   No murmur heard.  Pulmonary/Chest: Effort normal and breath sounds normal. No stridor. No respiratory distress. She has no wheezes. She has no rales. She exhibits no tenderness.   Abdominal: Soft. Bowel sounds are normal. She exhibits no distension. There is no tenderness.   Musculoskeletal: Normal range of motion. She exhibits no edema.   Neurological: She is alert.   Oriented to person, place, and situation   Skin: She is not diaphoretic.   Wound debrided, dressing in place. Wound vac in place.   Nursing note and vitals reviewed.          Significant Labs: All pertinent labs within the past 24 hours have been reviewed.

## 2019-04-15 NOTE — PROGRESS NOTES
[facility]  Podiatry  Consult Note    Patient Name: Keira Starkey  MRN: [id number]  Admission Date: 4/10/2019  Hospital Length of Stay: 5 days  Attending Physician: Mimi Pace MD  Primary Care Provider: Primary Doctor No     Consults  Subjective:     History of Present Illness: Mrs. Keira Starkey is a 74 y.o. female [facility] resident with essential hypertension, CKD stage 3, anemia chronic disease, GERD, dementia, and depression who was admitted for further evaluation of her left heel wound.  NH staff was concerned about the possibility of osteomyelitis and would like her to be evaluated.  She is a limited historian but is alert to time place name.  No further history obtained.    4/15/19: S/p two days left heel debridement/ bone biopsy. VAC intact no leaks noted.     Scheduled Meds:   ARIPiprazole  5 mg Oral Daily    carvedilol  6.25 mg Oral BID WM    cefTRIAXone (ROCEPHIN) IVPB  2 g Intravenous Q24H    donepezil  10 mg Oral QHS    escitalopram oxalate  15 mg Oral Daily    famotidine  20 mg Oral Daily    metroNIDAZOLE  500 mg Oral Q8H     Continuous Infusions:  PRN Meds:acetaminophen, cloNIDine, promethazine (PHENERGAN) IVPB, ramelteon    Review of patient's allergies indicates:  No Known Allergies     Past Medical History:   Diagnosis Date    Hypertension      Past Surgical History:   Procedure Laterality Date    DEBRIDEMENT, FOOT, BONE BIOPSY, WOUND VAC PLACEMENT Left 4/12/2019    Performed by Warner Kramer DPM at [facility] OR       Family History     None        Tobacco Use    Smoking status: Never Smoker   Substance and Sexual Activity    Alcohol use: Not Currently    Drug use: Not on file    Sexual activity: Not on file     Review of Systems   Unable to perform ROS: Dementia         Objective:     Vital Signs (Most Recent):  Temp: 98.3 °F (36.8 °C) (04/15/19 1629)  Pulse: (!) 51 (04/15/19 1629)  Resp: 18 (04/15/19 1629)  BP: (!) 144/63 (04/15/19 1629)  SpO2: 97  % (04/15/19 1629) Vital Signs (24h Range):  Temp:  [97.9 °F (36.6 °C)-98.6 °F (37 °C)] 98.3 °F (36.8 °C)  Pulse:  [51-76] 51  Resp:  [18-20] 18  SpO2:  [96 %-98 %] 97 %  BP: (134-152)/(60-80) 144/63     Weight: 75.1 kg (165 lb 9.6 oz)  Body mass index is 26.73 kg/m².    Foot Exam     Constitutional:  Patient is oriented to person, place, and time.  Appears well-developed and well-nourished.     Vascular:  Dorsalis pedis pulses are 1+ on the right side, and 1+ on the left side.   Posterior tibial pulses are 1+ on the right side, and 1+ on the left side.   - digital hair growth, capillary fill time to all toes = seconds, no swelling, feet and toes warm to the touch    Skin/Dermatological:  Skin is warm and intact.  No cyanosis or clubbing.    4/15/19  Wound 1: Left heel ulceration   Measurement: 9rw7yog9.5cm  Base: fibrous base  Periwound skin: Rolled edges  Drainage: none  Erythema: none  Probe: probe to bone.               S/p removal of ischemic tissue to periwound.         Right heel     Wound 1: Right lateral heel ulcer  Measurement: 6omz0ebi5.2cm  Base: necrotic   Periwound skin: HPK  Drainage: none  Erythema: none  Probe: none, no bone exposed                          Ulcer located left heel, measuring 12 x 7 x 0.2  cm to the subcutaneous  level, necrotic base, - overlying/periwound keratosis,  + mild edema, -erythema, -drainage, - fluctuance, + malodor            Musculoskeletal:      Restricted range of motion of midtarsal, subtalar joints.   No restriction of ankle joint dorsiflexion or plantarflexion.         Neurological:  No deficits to sharp/dull, light touch or vibratory sensation.   Muscle strength to tibialis anterior, extensor hallucis longus, extensor digitorum longus, peroneal muscles, flexor hallucis/digotorum longus, posterior tibial and gastrosoleal complex is 4/5, normal tone without assymmetry       Laboratory:  A1C: No results for input(s): HGBA1C in the last 4320 hours.  ABGs: No results for  input(s): PH, PCO2, HCO3, POCSATURATED, BE in the last 168 hours.  Blood Cultures: No results for input(s): LABBLOO in the last 48 hours.  BMP:   Recent Labs   Lab 04/11/19  0442  04/15/19  0416   *   < > 118*   *   < > 138   K 4.8   < > 3.8   *   < > 107   CO2 24   < > 24   BUN 29*   < > 19   CREATININE 1.6*   < > 1.5*   CALCIUM 10.4   < > 9.6   MG 2.3  --   --     < > = values in this interval not displayed.     Cardiac Markers: No results for input(s): CKMB, TROPONINT, MYOGLOBIN in the last 168 hours.  CBC:   Recent Labs   Lab 04/15/19  0416   WBC 7.80   RBC 3.65*   HGB 9.4*   HCT 31.3*      MCV 86   MCH 25.8*   MCHC 30.0*     CMP:   Recent Labs   Lab 04/11/19  0442  04/15/19  0416   *   < > 118*   CALCIUM 10.4   < > 9.6   ALBUMIN 2.6*  --   --    PROT 7.4  --   --    *   < > 138   K 4.8   < > 3.8   CO2 24   < > 24   *   < > 107   BUN 29*   < > 19   CREATININE 1.6*   < > 1.5*   ALKPHOS 66  --   --    ALT 16  --   --    AST 13  --   --    BILITOT 0.3  --   --     < > = values in this interval not displayed.     Coagulation:   Recent Labs   Lab 04/12/19  0927   INR 1.0     CPS: {:LNK,LABRCNTIP[  CRP:   Recent Labs   Lab 04/10/19  1728   CRP 68.1*     ESR:   Recent Labs   Lab 04/10/19  1728   SEDRATE 120*     LFTs:   Recent Labs   Lab 04/11/19 0442   ALT 16   AST 13   ALKPHOS 66   BILITOT 0.3   PROT 7.4   ALBUMIN 2.6*     Prealbumin: No results for input(s): PREALBUMIN in the last 48 hours.  Wound Cultures:   Recent Labs   Lab 04/10/19  1715 04/12/19  1250   LABAERO PROTEUS MIRABILIS  Many   PROTEUS MIRABILIS  Few    PROTEUS MIRABILIS  Moderate       Microbiology Results (last 7 days)     Procedure Component Value Units Date/Time    AFB Culture & Smear [[id number]] Collected:  04/12/19 1250    Order Status:  Completed Specimen:  Wound from Toe, Left Foot Updated:  04/15/19 1529     AFB Culture & Smear Culture in progress     AFB CULTURE STAIN No acid fast bacilli seen.     Narrative:       LEFT FOOT CALCANEUS    AFB Culture & Smear [[id number]] Collected:  04/12/19 1250    Order Status:  Completed Specimen:  Wound from Foot, Left Updated:  04/15/19 1529     AFB Culture & Smear Culture in progress     AFB CULTURE STAIN No acid fast bacilli seen.    Narrative:       LEFT HEEL    Culture, Anaerobic [[id number]] Collected:  04/12/19 1250    Order Status:  Completed Specimen:  Wound from Toe, Left Foot Updated:  04/15/19 1349     Anaerobic Culture --     FINEGOLDIA MAGNA  Moderate      Narrative:       LEFT FOOT CALCANEUS    Culture, Anaerobic [[id number]] Collected:  04/12/19 1250    Order Status:  Completed Specimen:  Wound from Foot, Left Updated:  04/15/19 1347     Anaerobic Culture --     FINEGOLDIA MAGNA  Moderate      Narrative:       LEFT HEEL    Aerobic culture [[id number]]  (Susceptibility) Collected:  04/12/19 1250    Order Status:  Completed Specimen:  Wound from Toe, Left Foot Updated:  04/14/19 1543     Aerobic Bacterial Culture --     PROTEUS MIRABILIS  Few      Narrative:       LEFT FOOT CALCANEUS    Aerobic culture [[id number]]  (Susceptibility) Collected:  04/12/19 1250    Order Status:  Completed Specimen:  Wound from Foot, Left Updated:  04/14/19 1542     Aerobic Bacterial Culture --     PROTEUS MIRABILIS  Moderate      Narrative:       LEFT HEEL    Gram stain [[id number]] Collected:  04/12/19 1250    Order Status:  Completed Specimen:  Wound from Toe, Left Foot Updated:  04/13/19 0917     Gram Stain Result Few WBC's      Rare Gram negative rods      Few Gram positive cocci    Narrative:       LEFT FOOT CALCANEUS    Gram stain [[id number]] Collected:  04/12/19 1250    Order Status:  Completed Specimen:  Wound from Foot, Left Updated:  04/13/19 0915     Gram Stain Result Rare WBC's      Rare Gram negative rods    Narrative:       LEFT HEEL    Fungus culture [[id number]] Collected:  04/12/19 1250    Order Status:  Sent Specimen:  Wound from Foot, Left Updated:  04/12/19 1448     Fungus culture [[id number]] Collected:  04/12/19 1250    Order Status:  Sent Specimen:  Wound from Toe, Left Foot Updated:  04/12/19 1437    Aerobic culture (Specify Source) **CANNOT BE ORDERED AS STAT** [[id number]]  (Susceptibility) Collected:  04/10/19 1715    Order Status:  Completed Specimen:  Wound from Foot, Left Updated:  04/12/19 0907     Aerobic Bacterial Culture --     PROTEUS MIRABILIS  Many      Aerobic culture [[id number]]     Order Status:  No result Specimen:  Wound from Foot, Left         Specimen (12h ago, onward)    None        No results for input(s): COLORU, CLARITYU, SPECGRAV, PHUR, PROTEINUA, GLUCOSEU, BILIRUBINCON, BLOODU, WBCU, RBCU, BACTERIA, MUCUS, NITRITE, LEUKOCYTESUR, UROBILINOGEN, HYALINECASTS in the last 168 hours.  All pertinent labs reviewed within the last 24 hours.    Diagnostic Results:  MRI left foot 4/10/19: I have reviewed all pertinent results/findings within the past 24 hours.  Per radiology report: Osseous edema within the posterior calcaneus which may be secondary to reactive edema in this patient with posterior heel ulceration, although potential early developing osteomyelitis not excluded.     X-ray left foot 4/10/19 per report Suspected soft tissue ulceration about the heel without definite underlying acute displaced fracture-dislocation or destructive osseous process seen, noting that radiographic appearance of osteomyelitis can lag clinical presentation up to 2 weeks.  Further evaluation with elective/nonemergent bone scan can be obtained as warranted.      Assessment/Plan:     Active Diagnoses:    Diagnosis Date Noted POA    PRINCIPAL PROBLEM:  Acute hematogenous osteomyelitis of left foot [M86.072] 04/11/2019 No    Essential hypertension [I10] 04/11/2019 Yes     Chronic    CKD stage 3 [N18.3] 04/11/2019 Yes     Chronic    Anemia of chronic disease [D63.8] 04/11/2019 Yes     Chronic    GERD (gastroesophageal reflux disease) [K21.9] 04/11/2019 Yes     Chronic     Dementia with behavioral disturbance [F03.91] 04/11/2019 Yes     Chronic    Depression [F32.9] 04/11/2019 Yes     Chronic    Chronic heel ulcer, left, with necrosis of muscle [L97.423] 04/11/2019 Yes    Chronic heel ulcer, left, with necrosis of bone [L97.424] 04/11/2019 Yes     Chronic    Open wound of left heel [S91.302A] 04/10/2019 Yes      Problems Resolved During this Admission:     S/p two days left heel debridement, bone biopsy, fibrous base noted to wound, mild ischemic changes to periwound. With patient's permission sterile scissors and forceps used to remove nonviable tissue.    wound VAC to left heel ulceration. Applied without problems with seal, adequate suction noted. Covered with dry sterile dressing. Wound vac change every 2-3 days. Set to 75 mmhg  Next VAC change on 4/17/19 if still inpatient.     Vascular surgery consult placed.     Abx per ID appreciate assistance     Wound care orders below:    Left foot- to be done every 2-3 days  1. Cleanse wound with saline. Pat dry  2. Apply VAC to left heel  3. Set to 75mmhg  4. Wrap with cast padding, ACE    Right foot- to be done every 2-3 days  1. Cleanse wound with saline. Pat dry  2. Apply betadine to site. Cover with non adherent foam  3. Wrap with cast padding.         Thank you for your consult. I will follow-up with patient. Please contact us if you have any additional questions.    Bharti Zaman DPM  Podiatry  [facility]

## 2019-04-16 VITALS
BODY MASS INDEX: 26.62 KG/M2 | WEIGHT: 165.63 LBS | DIASTOLIC BLOOD PRESSURE: 63 MMHG | RESPIRATION RATE: 18 BRPM | HEIGHT: 66 IN | TEMPERATURE: 98 F | HEART RATE: 54 BPM | OXYGEN SATURATION: 98 % | SYSTOLIC BLOOD PRESSURE: 124 MMHG

## 2019-04-16 LAB
ANION GAP SERPL CALC-SCNC: 6 MMOL/L (ref 8–16)
BASOPHILS # BLD AUTO: 0.03 K/UL (ref 0–0.2)
BASOPHILS NFR BLD: 0.4 % (ref 0–1.9)
BUN SERPL-MCNC: 16 MG/DL (ref 8–23)
CALCIUM SERPL-MCNC: 9.6 MG/DL (ref 8.7–10.5)
CHLORIDE SERPL-SCNC: 106 MMOL/L (ref 95–110)
CO2 SERPL-SCNC: 27 MMOL/L (ref 23–29)
CREAT SERPL-MCNC: 1.4 MG/DL (ref 0.5–1.4)
DIFFERENTIAL METHOD: ABNORMAL
EOSINOPHIL # BLD AUTO: 0.3 K/UL (ref 0–0.5)
EOSINOPHIL NFR BLD: 4.1 % (ref 0–8)
ERYTHROCYTE [DISTWIDTH] IN BLOOD BY AUTOMATED COUNT: 14.5 % (ref 11.5–14.5)
EST. GFR  (AFRICAN AMERICAN): 42 ML/MIN/1.73 M^2
EST. GFR  (NON AFRICAN AMERICAN): 37 ML/MIN/1.73 M^2
GLUCOSE SERPL-MCNC: 80 MG/DL (ref 70–110)
HCT VFR BLD AUTO: 30.3 % (ref 37–48.5)
HGB BLD-MCNC: 9.1 G/DL (ref 12–16)
LYMPHOCYTES # BLD AUTO: 2.5 K/UL (ref 1–4.8)
LYMPHOCYTES NFR BLD: 31.2 % (ref 18–48)
MCH RBC QN AUTO: 25.9 PG (ref 27–31)
MCHC RBC AUTO-ENTMCNC: 30 G/DL (ref 32–36)
MCV RBC AUTO: 86 FL (ref 82–98)
MONOCYTES # BLD AUTO: 0.7 K/UL (ref 0.3–1)
MONOCYTES NFR BLD: 9 % (ref 4–15)
NEUTROPHILS # BLD AUTO: 4.4 K/UL (ref 1.8–7.7)
NEUTROPHILS NFR BLD: 55.3 % (ref 38–73)
PLATELET # BLD AUTO: 330 K/UL (ref 150–350)
PMV BLD AUTO: 8.7 FL (ref 9.2–12.9)
POTASSIUM SERPL-SCNC: 3.8 MMOL/L (ref 3.5–5.1)
RBC # BLD AUTO: 3.52 M/UL (ref 4–5.4)
SODIUM SERPL-SCNC: 139 MMOL/L (ref 136–145)
WBC # BLD AUTO: 8.01 K/UL (ref 3.9–12.7)

## 2019-04-16 PROCEDURE — 25000003 PHARM REV CODE 250: Performed by: PHYSICIAN ASSISTANT

## 2019-04-16 PROCEDURE — A4216 STERILE WATER/SALINE, 10 ML: HCPCS | Performed by: INTERNAL MEDICINE

## 2019-04-16 PROCEDURE — 63600175 PHARM REV CODE 636 W HCPCS: Performed by: PHYSICIAN ASSISTANT

## 2019-04-16 PROCEDURE — 25000003 PHARM REV CODE 250: Performed by: INTERNAL MEDICINE

## 2019-04-16 PROCEDURE — 80048 BASIC METABOLIC PNL TOTAL CA: CPT

## 2019-04-16 PROCEDURE — 25000003 PHARM REV CODE 250

## 2019-04-16 PROCEDURE — 85025 COMPLETE CBC W/AUTO DIFF WBC: CPT

## 2019-04-16 PROCEDURE — 99223 PR INITIAL HOSPITAL CARE,LEVL III: ICD-10-PCS | Mod: ,,, | Performed by: SURGERY

## 2019-04-16 PROCEDURE — 99223 1ST HOSP IP/OBS HIGH 75: CPT | Mod: ,,, | Performed by: SURGERY

## 2019-04-16 RX ADMIN — Medication 10 ML: at 12:04

## 2019-04-16 RX ADMIN — ACETAMINOPHEN 500 MG: 500 TABLET, FILM COATED ORAL at 05:04

## 2019-04-16 RX ADMIN — Medication 10 ML: at 06:04

## 2019-04-16 RX ADMIN — CARVEDILOL 6.25 MG: 6.25 TABLET, FILM COATED ORAL at 09:04

## 2019-04-16 RX ADMIN — METRONIDAZOLE 500 MG: 500 TABLET ORAL at 01:04

## 2019-04-16 RX ADMIN — CEFTRIAXONE 2 G: 2 INJECTION, SOLUTION INTRAVENOUS at 11:04

## 2019-04-16 RX ADMIN — Medication 10 ML: at 01:04

## 2019-04-16 RX ADMIN — ESCITALOPRAM OXALATE 15 MG: 5 TABLET, FILM COATED ORAL at 09:04

## 2019-04-16 RX ADMIN — FAMOTIDINE 20 MG: 20 TABLET ORAL at 09:04

## 2019-04-16 RX ADMIN — METRONIDAZOLE 500 MG: 500 TABLET ORAL at 05:04

## 2019-04-16 RX ADMIN — ARIPIPRAZOLE 5 MG: 5 TABLET ORAL at 09:04

## 2019-04-16 NOTE — PROGRESS NOTES
After viewing cxr dictation and speaking with Dr Pace, I attempted to pull back 2 cm but it came out slightly more then 2 and I didn't feel comfortable leaving it so I over the wired new picc at 37cm, new biopatch and sterile dressing applied. New cxr ordered.

## 2019-04-16 NOTE — SUBJECTIVE & OBJECTIVE
Medications Prior to Admission   Medication Sig Dispense Refill Last Dose    ARIPiprazole (ABILIFY) 15 MG Tab Take 15 mg by mouth nightly.    4/10/2019    ascorbic acid, vitamin C, (VITAMIN C) 100 MG tablet Take 500 mg by mouth once daily.    4/10/2019    carvedilol (COREG) 6.25 MG tablet Take 6.25 mg by mouth 2 (two) times daily with meals.   4/10/2019    donepezil (ARICEPT) 10 MG tablet Take 10 mg by mouth every evening.   4/10/2019    escitalopram oxalate (LEXAPRO) 10 MG tablet Take 15 mg by mouth once daily.   4/10/2019    fluticasone (FLONASE) 50 mcg/actuation nasal spray 1 spray by Each Nare route once daily.   4/10/2019    multivitamin capsule Take 1 capsule by mouth once daily.   4/10/2019    ranitidine (ZANTAC) 150 MG tablet Take 150 mg by mouth once daily.    4/10/2019    traZODone (DESYREL) 50 MG tablet Take 25 mg by mouth every evening.       vitamin D (VITAMIN D3) 1000 units Tab Take 1,000 Units by mouth once daily.   4/10/2019    zinc sulfate (ZINCATE) 220 (50) mg capsule Take 220 mg by mouth once daily.   4/10/2019       Review of patient's allergies indicates:  No Known Allergies    Past Medical History:   Diagnosis Date    Hypertension      Past Surgical History:   Procedure Laterality Date    DEBRIDEMENT, FOOT, BONE BIOPSY, WOUND VAC PLACEMENT Left 4/12/2019    Performed by Warenr Kramer DPM at [facility]     Family History     None        Tobacco Use    Smoking status: Never Smoker   Substance and Sexual Activity    Alcohol use: Not Currently    Drug use: Not on file    Sexual activity: Not on file     Review of Systems   Constitutional: Negative for chills and fever.   HENT: Negative for congestion and sore throat.    Eyes: Negative for visual disturbance.   Respiratory: Negative for shortness of breath and wheezing.    Cardiovascular: Negative for chest pain and palpitations.   Gastrointestinal: Negative for abdominal distention, abdominal pain, constipation, diarrhea, nausea  and vomiting.   Endocrine: Negative for cold intolerance.   Genitourinary: Negative for dysuria and hematuria.   Musculoskeletal: Negative for joint swelling.   Skin: Positive for color change and wound. Negative for rash.   Allergic/Immunologic: Negative for immunocompromised state.   Neurological: Negative for seizures and headaches.   Hematological: Does not bruise/bleed easily.   Psychiatric/Behavioral: The patient is not nervous/anxious.      Objective:     Vital Signs (Most Recent):  Temp: 98 °F (36.7 °C) (04/16/19 1626)  Pulse: (!) 54 (04/16/19 1626)  Resp: 18 (04/16/19 1626)  BP: 124/63 (04/16/19 1626)  SpO2: 98 % (04/16/19 1626) Vital Signs (24h Range):  Temp:  [97.6 °F (36.4 °C)-98.1 °F (36.7 °C)] 98 °F (36.7 °C)  Pulse:  [45-90] 54  Resp:  [18] 18  SpO2:  [95 %-98 %] 98 %  BP: (113-153)/(55-68) 124/63     Weight: 75.1 kg (165 lb 9.6 oz)  Body mass index is 26.73 kg/m².    Physical Exam   Constitutional: She appears well-developed and well-nourished. No distress.   HENT:   Head: Normocephalic and atraumatic.   Eyes: Conjunctivae are normal.   Neck: Neck supple.   Cardiovascular: Normal rate.   Pulmonary/Chest: Effort normal. No respiratory distress.   Abdominal: Soft. She exhibits no distension and no mass. There is no tenderness. There is no rebound and no guarding. No hernia.   Musculoskeletal: Normal range of motion. She exhibits no edema, tenderness or deformity.   Neurological: She is alert. No sensory deficit.   Skin: Skin is warm and dry. Capillary refill takes less than 2 seconds. No rash noted. She is not diaphoretic. No erythema. No pallor.        Psychiatric: She has a normal mood and affect.   Vitals reviewed.      Significant Labs:  All pertinent labs from the last 24 hours have been reviewed.    Significant Diagnostics:  I have reviewed all pertinent imaging results/findings within the past 24 hours.

## 2019-04-16 NOTE — DISCHARGE SUMMARY
"[facility] Hospital Medicine  Discharge Summary      Patient Name: Keira Starkey  MRN: [id number]  Admission Date: 4/10/2019  Hospital Length of Stay: 6 days  Discharge Date and Time:  04/16/2019 9:26 AM  Attending Physician: Osorio Dent MD   Discharging Provider: Osorio Dent MD  Primary Care Provider: Primary Doctor No      HPI:   Mrs. Keira Starkey is a 74 y.o. female [facility] resident with essential hypertension, CKD stage 3, anemia chronic disease, GERD, dementia, and depression who presents to [facility] ED with complaints of left heel infection.  NH staff was concerned about the possibility of osteomyelitis and would like her to be evaluated.  Further history is otherwise limited at this time.    Procedure(s) (LRB):  DEBRIDEMENT, FOOT, BONE BIOPSY, WOUND VAC PLACEMENT (Left)      Hospital Course:   Ms. Starkey was admitted for a left heal wound with imaging concerning for osteomyelitis. Xray and MRI of the foot were both borderline with MRI read as, "Osseous edema within the posterior calcaneus which may be secondary to reactive edema in this patient with posterior heel ulceration, although potential early developing osteomyelitis not excluded."  She was not septic but was started empirically on antibiotics on admission. Podiatry evaluated the patient. Arterial studies showed adequate flow. She went for debridement and bone biopsy 4/12/2019. She tolerated the procedure well. Cultures growing Proteus mirabilis. ID consulted.  ID recommended IV Rocephin and oral Flagyl until 5/23/19.  Patient has remained afebrile and hemodynamically stable.  Podiatry placed wound vac to left heel.  Patient will be discharged to SNF to continue ABx treatment and wound care.  Patient to follow up with Podiatry and ID.     Consults:   Consults (From admission, onward)        Status Ordering Provider     Inpatient consult to Infectious Diseases  Once     Provider:  Didi Ramos MD    Completed " JARETH CHILDS     [facility]  Once     Provider:  (Not yet assigned)    Completed JARETH CHILDS     Inpatient consult to Podiatry  Once     Provider:  Tracey Pham DPM    Completed JARETH CHILDS     Inpatient consult to Vascular Surgery  Once     Provider:  (Not yet assigned)    BHARTI Khan            Final Active Diagnoses:    Diagnosis Date Noted POA    PRINCIPAL PROBLEM:  Acute hematogenous osteomyelitis of left foot [M86.072] 04/11/2019 No    Essential hypertension [I10] 04/11/2019 Yes     Chronic    CKD stage 3 [N18.3] 04/11/2019 Yes     Chronic    Anemia of chronic disease [D63.8] 04/11/2019 Yes     Chronic    GERD (gastroesophageal reflux disease) [K21.9] 04/11/2019 Yes     Chronic    Dementia with behavioral disturbance [F03.91] 04/11/2019 Yes     Chronic    Depression [F32.9] 04/11/2019 Yes     Chronic    Chronic heel ulcer, left, with necrosis of muscle [L97.423] 04/11/2019 Yes    Chronic heel ulcer, left, with necrosis of bone [L97.424] 04/11/2019 Yes     Chronic    Open wound of left heel [S91.302A] 04/10/2019 Yes      Problems Resolved During this Admission:       Discharged Condition: stable    Disposition: Skilled Nursing Facility    Follow Up:  Follow-up Information     Bharti Zaman DPM In 1 week.    Specialties:  Podiatry, Wound Care  Contact information:  [address] [place] [address]  [phone]             Didi Ramos MD In 3 weeks.    Specialty:  Infectious Diseases  Contact information:  [address]  [facility] [address] [phone]                 Patient Instructions:      Diet Cardiac     Notify your health care provider if you experience any of the following:  temperature >100.4     Notify your health care provider if you experience any of the following:  persistent nausea and vomiting or diarrhea     Notify your health care provider if you experience any of the following:  severe  uncontrolled pain     Notify your health care provider if you experience any of the following:  redness, tenderness, or signs of infection (pain, swelling, redness, odor or green/yellow discharge around incision site)     Notify your health care provider if you experience any of the following:  difficulty breathing or increased cough     Notify your health care provider if you experience any of the following:  persistent dizziness, light-headedness, or visual disturbances     Notify your health care provider if you experience any of the following:  increased confusion or weakness     Activity as tolerated         Pending Diagnostic Studies:     None         Medications:  Reconciled Home Medications:      Medication List      START taking these medications    cefTRIAXone 2 g in dextrose 5 % 50 mL 2 g/50 mL Pgbk IVPB  Commonly known as:  ROCEPHIN  Inject 50 mLs (2 g total) into the vein once daily.     metroNIDAZOLE 500 MG tablet  Commonly known as:  FLAGYL  Take 1 tablet (500 mg total) by mouth every 8 (eight) hours.        CONTINUE taking these medications    ARIPiprazole 15 MG Tab  Commonly known as:  ABILIFY  Take 15 mg by mouth nightly.     carvedilol 6.25 MG tablet  Commonly known as:  COREG  Take 6.25 mg by mouth 2 (two) times daily with meals.     donepezil 10 MG tablet  Commonly known as:  ARICEPT  Take 10 mg by mouth every evening.     fluticasone 50 mcg/actuation nasal spray  Commonly known as:  FLONASE  1 spray by Each Nare route once daily.     LEXAPRO 10 MG tablet  Generic drug:  escitalopram oxalate  Take 15 mg by mouth once daily.     multivitamin capsule  Take 1 capsule by mouth once daily.     ranitidine 150 MG tablet  Commonly known as:  ZANTAC  Take 150 mg by mouth once daily.     traZODone 50 MG tablet  Commonly known as:  DESYREL  Take 25 mg by mouth every evening.     VITAMIN C 100 MG tablet  Generic drug:  ascorbic acid (vitamin C)  Take 500 mg by mouth once daily.     vitamin D 1000 units  Tab  Commonly known as:  VITAMIN D3  Take 1,000 Units by mouth once daily.     zinc sulfate 220 (50) mg capsule  Commonly known as:  ZINCATE  Take 220 mg by mouth once daily.            Indwelling Lines/Drains at time of discharge:   Lines/Drains/Airways     Peripherally Inserted Central Catheter Line                 PICC Double Lumen 04/15/19 1937 right basilic less than 1 day          Drain            Female External Urinary Catheter 04/12/19 0110 4 days          Pressure Ulcer                 Pressure Injury 04/10/19 2300 lower Sacral spine Stage 2 5 days         Negative Pressure Wound Therapy  2 days                Time spent on the discharge of patient: >30 minutes  Patient was seen and examined on the date of discharge and determined to be suitable for discharge.         Osorio Dent MD  Department of Hospital Medicine  [facility]

## 2019-04-16 NOTE — NURSING
Pt has rested well tonight. Pt has denied pain or needs. Pure wick external cath putting out dark urine. Wound vac in place no drainage.

## 2019-04-16 NOTE — PHYSICIAN QUERY
PT Name: Keira Starkey  MR #: [id number]    Physician Query Form - Non-Pressure Ulcer Clarification      CDS/: Maty Krause               Contact information: [phone]    This form is a permanent document in the medical record.     Query Date: April 16, 2019    By submitting this query, we are merely seeking further clarification of documentation. Please utilize your independent clinical judgment when addressing the question(s) below.    The Medical Record contains the following:   Indicator   Supporting Clinical Findings Location in Medical Record    x Non-pressure Ulcer Right heel    Wound 1: Right lateral heel ulcer  Measurement: [id number]  Base: necrotic   Periwound skin: HPK  Drainage: none  Erythema: none  Probe: none, no bone exposed     Podiatry Progress Notes: 4/15/2019    Wound Care Consult      Radiology Findings      x Acute/Chronic Illness  Acute hematogenous osteomyelitis of left foot; Essential Hypertension; CKD Stage 3, Anemia of chronic disease; Dementia with behavorial disturbance; Chronic Depression; Chronic heel ulcer, left, with necrosis of muscle; Chronic heel ulcer, left, with necrosis of bone; Open wound of left heel  Podiatry Progress Notes 4/15/2019    x Treatment:  Right foot- to be done every 2-3 days  1. Cleanse wound with saline. Pat dry  2. Apply betadine to site. Cover with non adherent foam  3. Wrap with cast padding.     Podiatry Progress Notes: 4/15/2019    Other:        Provider, Please specify the diagnosis or diagnoses associated with above clinical findings.  [   ] Exposed fat layer   [   ] Muscle necrosis   [ x  ] Other depth (please specify): Bone   [   ] Other Integumentary Diagnosis (please specify):   [  ] Clinically Undetermined       Please document in your progress notes daily for the duration of treatment, until resolved, and include in your discharge summary.

## 2019-04-16 NOTE — NURSING
1805 [facility]. Wound vac removed.

## 2019-04-16 NOTE — NURSING
Attempted to call report to [phone] twice. Placed on hold for over 5 mins both  times.  notified. Pure wick removed, picc maintained. Brief changed, belongings packed up awaiting transport.

## 2019-04-16 NOTE — PLAN OF CARE
Call report information and discharge packet provided to Nurse Stafford.    Patient's appointments sent to [facility] via [employer] [facility]. Jennifer at [facility] informed.       04/16/19 9805   Final Note   Assessment Type Final Discharge Note   Anticipated Discharge Disposition [facility]   What phone number can be called within the next 1-3 days to see how you are doing after discharge?   ([phone])   Hospital Follow Up  Appt(s) scheduled? Yes   Discharge plans and expectations educations in teach back method with documentation complete? Yes   Right Care Referral Info   Post Acute Recommendation Other   Facility Name   ([facility] )

## 2019-04-16 NOTE — HPI
Jemal Caal MD [facility]                         04/16/2019    HPI:  Keira Starkey is a 74 y.o. female with   Patient Active Problem List   Diagnosis    Open wound of left heel    Essential hypertension    CKD stage 3    Anemia of chronic disease    GERD (gastroesophageal reflux disease)    Dementia with behavioral disturbance    Depression    Chronic heel ulcer, left, with necrosis of muscle    Acute hematogenous osteomyelitis of left foot    Chronic heel ulcer, left, with necrosis of bone    being managed by PCP and specialists who is here for evaluation of L heel wound.  Patient states location is L heel occurring for several months.  S/p debridement, bone biopsy and WV placement by Podiatry 4/12/19.  Associated signs and symptoms include infection.  No pain.  Symptoms began several months ago.  Alleviating factors include wound care.  Worsening factors include pressure.  Vascular Surgery was consulted to evaluate for PVD.    denies MI  denies Stroke  Tobacco use: denies    Past Medical History:   Diagnosis Date    Hypertension      Past Surgical History:   Procedure Laterality Date    DEBRIDEMENT, FOOT, BONE BIOPSY, WOUND VAC PLACEMENT Left 4/12/2019    Performed by Warner Kramer DPM at [facility] OR     History reviewed. No pertinent family history.  Social History     Socioeconomic History    Marital status:      Spouse name: Not on file    Number of children: Not on file    Years of education: Not on file    Highest education level: Not on file   Occupational History    Not on file   Social Needs    Financial resource strain: Not on file    Food insecurity:     Worry: Not on file     Inability: Not on file    Transportation needs:     Medical: Not on file     Non-medical: Not on file   Tobacco Use    Smoking status: Never Smoker   Substance and Sexual Activity    Alcohol use: Not Currently    Drug use: Not on file     Sexual activity: Not on file   Lifestyle    Physical activity:     Days per week: Not on file     Minutes per session: Not on file    Stress: Not on file   Relationships    Social connections:     Talks on phone: Not on file     Gets together: Not on file     Attends Judaism service: Not on file     Active member of club or organization: Not on file     Attends meetings of clubs or organizations: Not on file     Relationship status: Not on file   Other Topics Concern    Not on file   Social History Narrative    Not on file       Current Facility-Administered Medications:     acetaminophen tablet 500 mg, 500 mg, Oral, Q6H PRN, Warner Kramer, DPM, 500 mg at 04/16/19 0537    ARIPiprazole tablet 5 mg, 5 mg, Oral, Daily, Warner Kramer, DPM, 5 mg at 04/16/19 0924    carvedilol tablet 6.25 mg, 6.25 mg, Oral, BID WM, Warner Kramer, DPM, 6.25 mg at 04/16/19 0924    cefTRIAXone (ROCEPHIN) 2 g in dextrose 5 % 50 mL IVPB, 2 g, Intravenous, Q24H, Ezequiel Lundberg PA-C, 2 g at 04/16/19 1139    cloNIDine tablet 0.1 mg, 0.1 mg, Oral, TID PRN, Warner Kramer, DPM    donepezil tablet 10 mg, 10 mg, Oral, QHS, Warner Sheldonn, DPM, 10 mg at 04/15/19 2117    escitalopram oxalate tablet 15 mg, 15 mg, Oral, Daily, Warner Kramer, DPM, 15 mg at 04/16/19 0924    famotidine tablet 20 mg, 20 mg, Oral, Daily, Warner Sheldonn, DPM, 20 mg at 04/16/19 0924    metroNIDAZOLE tablet 500 mg, 500 mg, Oral, Q8H, Ezequiel Lundberg PA-C, 500 mg at 04/16/19 1337    promethazine (PHENERGAN) 6.25 mg in dextrose 5 % 50 mL IVPB, 6.25 mg, Intravenous, Q6H PRN, Warner Kramer, DPELOISE    ramelteon tablet 8 mg, 8 mg, Oral, Nightly PRN, Warner Kramer, DPM, 8 mg at 04/15/19 2117    Flushing PICC Protocol, , , Until Discontinued **AND** sodium chloride 0.9% flush 10 mL, 10 mL, Intravenous, Q6H, 10 mL at 04/16/19 1200 **AND** sodium chloride 0.9% flush 10 mL, 10 mL, Intravenous, PRN, Mimi  MD Sanjeev

## 2019-04-16 NOTE — ASSESSMENT & PLAN NOTE
-Imaging reviewed. Pt appears to have adequate perfusion to heal wound - rec cont wound care, offloading and optimization of comorbidities  -If wound does not heal well, please refer for further mgmt

## 2019-04-16 NOTE — PROGRESS NOTES
TN sent patient's clinicals (Packet, POC, MD notes, and MAR) to [facility].    Spoke with Jennifer at [facility]. Jennifer will review patient's orders. Awaiting feedback.     1320- Call report information received. Nurse will report to [facility] Nurse. Patient going to room 104.     1326- ADT 30 order placed for  Transportation.      ETA: 3:15pm, Patient going via wheelchair.   If transportation does not arrive at ETA time nurse will be instructed to follow protocol for transportation below:  How can I get in touch directly with dispatch, if needed?                 Non-emergent dispatch: [phone]                                    Emergent dispatch: [phone]  Non-emergent (stretcher): [phone]  Escalation Needs (PFC Lead): [phone]

## 2019-04-16 NOTE — NURSING
Plan of care reviewed with patient. She is alert and oriented to self and place. Iv fluids infusing as ordered. picc flushed per order. She is turned q2h. Pr can be low 50s, high 40s, md aware, no new orders given. Bed in low locked position, call light in reach. Will continue to monitor for safety.

## 2019-04-16 NOTE — PROGRESS NOTES
TN contacted Dispatcher Bhavesh to inform that patient will need a stretcher for transport. Approved by Jennifer Flores at [facility].

## 2019-04-16 NOTE — PROGRESS NOTES
Podiatry and ID appointments scheduled.    Follow-up Information     Bharti Zaman DPM On 5/1/2019.    Specialties:  Podiatry, Wound Care  Why:  Podiatry appt On Wednesday @ 10:15am, outpatient services, Patient placed on waiting list for earlier appt.  Contact information:  [address]  [place] [address]  [phone]             Natalia Lee PA-C On 5/7/2019.    Specialty:  Infectious Diseases  Why:  ID appt On Tuesday @ 2:30pm, outpatient services  Contact information:  [address]  [facility] [address]  [phone]

## 2019-04-16 NOTE — CONSULTS
[facility]  Vascular Surgery  Consult Note    Inpatient consult to Vascular Surgery  Consult performed by: Jemal Caal MD  Consult ordered by: Bharti Zaman DPM        Subjective:     Chief Complaint/Reason for Admission: L foot wound    History of Present Illness:             Jemal Caal MD [facility]                         04/16/2019    HPI:  Keira Starkey is a 74 y.o. female with   Patient Active Problem List   Diagnosis    Open wound of left heel    Essential hypertension    CKD stage 3    Anemia of chronic disease    GERD (gastroesophageal reflux disease)    Dementia with behavioral disturbance    Depression    Chronic heel ulcer, left, with necrosis of muscle    Acute hematogenous osteomyelitis of left foot    Chronic heel ulcer, left, with necrosis of bone    being managed by PCP and specialists who is here for evaluation of L heel wound.  Patient states location is L heel occurring for several months.  S/p debridement, bone biopsy and WV placement by Podiatry 4/12/19.  Associated signs and symptoms include infection.  No pain.  Symptoms began several months ago.  Alleviating factors include wound care.  Worsening factors include pressure.  Vascular Surgery was consulted to evaluate for PVD.    denies MI  denies Stroke  Tobacco use: denies    Past Medical History:   Diagnosis Date    Hypertension      Past Surgical History:   Procedure Laterality Date    DEBRIDEMENT, FOOT, BONE BIOPSY, WOUND VAC PLACEMENT Left 4/12/2019    Performed by Warner Kramer DPM at [facility] OR     History reviewed. No pertinent family history.  Social History     Socioeconomic History    Marital status:      Spouse name: Not on file    Number of children: Not on file    Years of education: Not on file    Highest education level: Not on file   Occupational History    Not on file   Social Needs    Financial resource strain:  Not on file    Food insecurity:     Worry: Not on file     Inability: Not on file    Transportation needs:     Medical: Not on file     Non-medical: Not on file   Tobacco Use    Smoking status: Never Smoker   Substance and Sexual Activity    Alcohol use: Not Currently    Drug use: Not on file    Sexual activity: Not on file   Lifestyle    Physical activity:     Days per week: Not on file     Minutes per session: Not on file    Stress: Not on file   Relationships    Social connections:     Talks on phone: Not on file     Gets together: Not on file     Attends Buddhism service: Not on file     Active member of club or organization: Not on file     Attends meetings of clubs or organizations: Not on file     Relationship status: Not on file   Other Topics Concern    Not on file   Social History Narrative    Not on file       Current Facility-Administered Medications:     acetaminophen tablet 500 mg, 500 mg, Oral, Q6H PRN, Warner Kramer, DPM, 500 mg at 04/16/19 0537    ARIPiprazole tablet 5 mg, 5 mg, Oral, Daily, Warner Kramer, DPM, 5 mg at 04/16/19 0924    carvedilol tablet 6.25 mg, 6.25 mg, Oral, BID WM, Warner Kramer, DPM, 6.25 mg at 04/16/19 0924    cefTRIAXone (ROCEPHIN) 2 g in dextrose 5 % 50 mL IVPB, 2 g, Intravenous, Q24H, Ezequiel Lundberg PA-C, 2 g at 04/16/19 1139    cloNIDine tablet 0.1 mg, 0.1 mg, Oral, TID PRN, Warner Kramer, DPM    donepezil tablet 10 mg, 10 mg, Oral, QHS, Warner Kramer, DPM, 10 mg at 04/15/19 2117    escitalopram oxalate tablet 15 mg, 15 mg, Oral, Daily, aWrner Kramer, DPM, 15 mg at 04/16/19 0924    famotidine tablet 20 mg, 20 mg, Oral, Daily, Warner Kramer, DPM, 20 mg at 04/16/19 0924    metroNIDAZOLE tablet 500 mg, 500 mg, Oral, Q8H, Ezequiel Lundberg PA-C, 500 mg at 04/16/19 1337    promethazine (PHENERGAN) 6.25 mg in dextrose 5 % 50 mL IVPB, 6.25 mg, Intravenous, Q6H PRN, Warner Kramer, SABINA marrero  tablet 8 mg, 8 mg, Oral, Nightly PRN, Warner Kramer DPM, 8 mg at 04/15/19 2117    Flushing PICC Protocol, , , Until Discontinued **AND** sodium chloride 0.9% flush 10 mL, 10 mL, Intravenous, Q6H, 10 mL at 04/16/19 1200 **AND** sodium chloride 0.9% flush 10 mL, 10 mL, Intravenous, PRN, Mimi Pace MD      Medications Prior to Admission   Medication Sig Dispense Refill Last Dose    ARIPiprazole (ABILIFY) 15 MG Tab Take 15 mg by mouth nightly.    4/10/2019    ascorbic acid, vitamin C, (VITAMIN C) 100 MG tablet Take 500 mg by mouth once daily.    4/10/2019    carvedilol (COREG) 6.25 MG tablet Take 6.25 mg by mouth 2 (two) times daily with meals.   4/10/2019    donepezil (ARICEPT) 10 MG tablet Take 10 mg by mouth every evening.   4/10/2019    escitalopram oxalate (LEXAPRO) 10 MG tablet Take 15 mg by mouth once daily.   4/10/2019    fluticasone (FLONASE) 50 mcg/actuation nasal spray 1 spray by Each Nare route once daily.   4/10/2019    multivitamin capsule Take 1 capsule by mouth once daily.   4/10/2019    ranitidine (ZANTAC) 150 MG tablet Take 150 mg by mouth once daily.    4/10/2019    traZODone (DESYREL) 50 MG tablet Take 25 mg by mouth every evening.       vitamin D (VITAMIN D3) 1000 units Tab Take 1,000 Units by mouth once daily.   4/10/2019    zinc sulfate (ZINCATE) 220 (50) mg capsule Take 220 mg by mouth once daily.   4/10/2019       Review of patient's allergies indicates:  No Known Allergies    Past Medical History:   Diagnosis Date    Hypertension      Past Surgical History:   Procedure Laterality Date    DEBRIDEMENT, FOOT, BONE BIOPSY, WOUND VAC PLACEMENT Left 4/12/2019    Performed by Warner Kramer DPM at [facility] OR     Family History     None        Tobacco Use    Smoking status: Never Smoker   Substance and Sexual Activity    Alcohol use: Not Currently    Drug use: Not on file    Sexual activity: Not on file     Review of Systems   Constitutional: Negative for chills and  fever.   HENT: Negative for congestion and sore throat.    Eyes: Negative for visual disturbance.   Respiratory: Negative for shortness of breath and wheezing.    Cardiovascular: Negative for chest pain and palpitations.   Gastrointestinal: Negative for abdominal distention, abdominal pain, constipation, diarrhea, nausea and vomiting.   Endocrine: Negative for cold intolerance.   Genitourinary: Negative for dysuria and hematuria.   Musculoskeletal: Negative for joint swelling.   Skin: Positive for color change and wound. Negative for rash.   Allergic/Immunologic: Negative for immunocompromised state.   Neurological: Negative for seizures and headaches.   Hematological: Does not bruise/bleed easily.   Psychiatric/Behavioral: The patient is not nervous/anxious.      Objective:     Vital Signs (Most Recent):  Temp: 98 °F (36.7 °C) (04/16/19 1626)  Pulse: (!) 54 (04/16/19 1626)  Resp: 18 (04/16/19 1626)  BP: 124/63 (04/16/19 1626)  SpO2: 98 % (04/16/19 1626) Vital Signs (24h Range):  Temp:  [97.6 °F (36.4 °C)-98.1 °F (36.7 °C)] 98 °F (36.7 °C)  Pulse:  [45-90] 54  Resp:  [18] 18  SpO2:  [95 %-98 %] 98 %  BP: (113-153)/(55-68) 124/63     Weight: 75.1 kg (165 lb 9.6 oz)  Body mass index is 26.73 kg/m².    Physical Exam   Constitutional: She appears well-developed and well-nourished. No distress.   HENT:   Head: Normocephalic and atraumatic.   Eyes: Conjunctivae are normal.   Neck: Neck supple.   Cardiovascular: Normal rate.   Pulmonary/Chest: Effort normal. No respiratory distress.   Abdominal: Soft. She exhibits no distension and no mass. There is no tenderness. There is no rebound and no guarding. No hernia.   Musculoskeletal: Normal range of motion. She exhibits no edema, tenderness or deformity.   Neurological: She is alert. No sensory deficit.   Skin: Skin is warm and dry. Capillary refill takes less than 2 seconds. No rash noted. She is not diaphoretic. No erythema. No pallor.        Psychiatric: She has a normal  mood and affect.   Vitals reviewed.      Significant Labs:  All pertinent labs from the last 24 hours have been reviewed.    Significant Diagnostics:  I have reviewed all pertinent imaging results/findings within the past 24 hours.    Assessment/Plan:     * Acute hematogenous osteomyelitis of left foot  -Agree with Abx    Open wound of left heel  -Imaging reviewed. Pt appears to have adequate perfusion to heal wound - rec cont wound care, offloading and optimization of comorbidities  -If wound does not heal well, please refer for further mgmt        Thank you for your consult. I will follow-up with patient. Please contact us if you have any additional questions.    Jemal Caal MD  Vascular Surgery  [facility]

## 2019-04-16 NOTE — PLAN OF CARE
04/16/19 1441   Medicare Message   Important Message from Medicare regarding Discharge Appeal Rights Signed/date by patient/caregiver;Given to patient/caregiver;Explained to patient/caregiver   Date IMM was signed 04/16/19   Time IMM was signed [id number]

## 2019-04-22 NOTE — ANESTHESIA POSTPROCEDURE EVALUATION
Anesthesia Post Evaluation    Patient: Keira Starkey    Procedure(s) Performed: Procedure(s) (LRB):  DEBRIDEMENT, FOOT, BONE BIOPSY, WOUND VAC PLACEMENT (Left)    Final Anesthesia Type: general  Patient location during evaluation: PACU  Patient participation: Yes- Able to Participate  Level of consciousness: awake and alert and oriented  Post-procedure vital signs: reviewed and stable  Airway patency: patent  PONV status at discharge: No PONV  Anesthetic complications: no      Cardiovascular status: blood pressure returned to baseline and hemodynamically stable  Respiratory status: unassisted, spontaneous ventilation and room air  Hydration status: euvolemic  Follow-up not needed.          Event Time     Out of Recovery 04/12/2019 14:21:57

## 2019-05-13 LAB
FUNGUS SPEC CULT: NORMAL
FUNGUS SPEC CULT: NORMAL

## 2019-06-04 ENCOUNTER — TELEPHONE (OUTPATIENT)
Dept: INFECTIOUS DISEASES | Facility: CLINIC | Age: 74
End: 2019-06-04

## 2019-06-04 NOTE — TELEPHONE ENCOUNTER
"----- Message from Kelly Olguin sent at 6/4/2019  1:12 PM CDT -----  Contact: [facility]  Called to make appt   Pt had surgery on foot in [facility]  In April     Pt  Had two f/u appts from hospital  5/7  And 5/24  That were not kept.   Pt was in a different facility   Pt is now in [facility]       Foot is bandaged    Pt also needs appt with Podiatry "wound care "        Does this need to be coordinated with Wound care   Or [facility]  [facility]  And us for infection ??    Please call [facility] with an answer       [phone] ([phone])     Thanks       "

## 2019-06-04 NOTE — TELEPHONE ENCOUNTER
Called [facility] and confirmed the pt has an appointment with ID and they would have to contact Wound Care and Podiatry to get appointments with those dept

## 2019-06-15 LAB
ACID FAST MOD KINY STN SPEC: NORMAL
ACID FAST MOD KINY STN SPEC: NORMAL
MYCOBACTERIUM SPEC QL CULT: NORMAL
MYCOBACTERIUM SPEC QL CULT: NORMAL

## 2019-06-20 ENCOUNTER — OFFICE VISIT (OUTPATIENT)
Dept: INFECTIOUS DISEASES | Facility: CLINIC | Age: 74
End: 2019-06-20
Payer: MEDICARE

## 2019-06-20 VITALS
TEMPERATURE: 98 F | BODY MASS INDEX: 26.73 KG/M2 | DIASTOLIC BLOOD PRESSURE: 81 MMHG | SYSTOLIC BLOOD PRESSURE: 131 MMHG | HEART RATE: 76 BPM | HEIGHT: 66 IN

## 2019-06-20 DIAGNOSIS — L97.424: Primary | ICD-10-CM

## 2019-06-20 PROCEDURE — 99214 PR OFFICE/OUTPT VISIT, EST, LEVL IV, 30-39 MIN: ICD-10-PCS | Mod: S$PBB,,, | Performed by: INTERNAL MEDICINE

## 2019-06-20 PROCEDURE — 99214 OFFICE O/P EST MOD 30 MIN: CPT | Mod: S$PBB,,, | Performed by: INTERNAL MEDICINE

## 2019-06-20 PROCEDURE — 99213 OFFICE O/P EST LOW 20 MIN: CPT | Mod: PBBFAC | Performed by: INTERNAL MEDICINE

## 2019-06-20 PROCEDURE — 99999 PR PBB SHADOW E&M-EST. PATIENT-LVL III: CPT | Mod: PBBFAC,,, | Performed by: INTERNAL MEDICINE

## 2019-06-20 PROCEDURE — 99999 PR PBB SHADOW E&M-EST. PATIENT-LVL III: ICD-10-PCS | Mod: PBBFAC,,, | Performed by: INTERNAL MEDICINE

## 2019-06-20 NOTE — PROGRESS NOTES
Subjective:      Patient ID: Keira Starkey is a 74 y.o. female.    Chief Complaint:Hospital Follow Up      History of Present Illness    75 y/o female with a history of CKD3, HTN and some dementia who lives at a nursing home.  She was admitted to [facility] in April, 2019 due to left heal ulcer.  MRI showed possible early osteomyelitis of the left calcaneus.  She underwent some debridement on April 12.  Cultures were positive for proteus mirabilis and finegoldia magna.  She was placed on IV ceftriaxone and oral metronidazole for a 6 week course.  She has completed her antibiotics now.  She is here today for follow up.  She denies diarrhea but had an episode of emesis this morning.    Review of Systems   Constitution: Negative for chills, decreased appetite, fever, malaise/fatigue, night sweats, weight gain and weight loss.   HENT: Negative for congestion, ear pain, hearing loss, hoarse voice, sore throat and tinnitus.    Eyes: Negative for blurred vision, redness and visual disturbance.   Cardiovascular: Negative for chest pain, leg swelling and palpitations.   Respiratory: Negative for cough, hemoptysis, shortness of breath and sputum production.    Hematologic/Lymphatic: Negative for adenopathy. Does not bruise/bleed easily.   Skin: Negative for dry skin, itching, rash and suspicious lesions.   Musculoskeletal: Negative for back pain, joint pain, myalgias and neck pain.   Gastrointestinal: Negative for abdominal pain, constipation, diarrhea, heartburn, nausea and vomiting.   Genitourinary: Negative for dysuria, flank pain, frequency, hematuria, hesitancy and urgency.   Neurological: Negative for dizziness, headaches, numbness, paresthesias and weakness.   Psychiatric/Behavioral: Negative for depression and memory loss. The patient does not have insomnia and is not nervous/anxious.      Objective:   Physical Exam   Constitutional: She appears well-developed and well-nourished. No distress.   HENT:   Head:  Normocephalic and atraumatic.   Right Ear: External ear normal.   Left Ear: External ear normal.   Nose: Nose normal.   Mouth/Throat: Oropharynx is clear and moist. No oropharyngeal exudate.   Eyes: Pupils are equal, round, and reactive to light. Conjunctivae and EOM are normal. Right eye exhibits no discharge. Left eye exhibits no discharge. No scleral icterus.   Neck: Normal range of motion. Neck supple. No JVD present. No tracheal deviation present. No thyromegaly present.   Cardiovascular: Normal rate, regular rhythm and intact distal pulses. Exam reveals no gallop and no friction rub.   No murmur heard.  Pulmonary/Chest: Effort normal and breath sounds normal. No stridor. No respiratory distress. She has no wheezes. She has no rales. She exhibits no tenderness.   Abdominal: Soft. Bowel sounds are normal. She exhibits no distension and no mass. There is no tenderness. There is no rebound and no guarding.   Musculoskeletal: Normal range of motion. She exhibits no edema or tenderness.   Lymphadenopathy:     She has no cervical adenopathy.   Neurological: She is alert. No cranial nerve deficit.   Skin: Skin is warm. No rash noted. She is not diaphoretic. No erythema. No pallor.   Left heel wound is shallow and significantly smaller than before.   Psychiatric: She has a normal mood and affect. Her behavior is normal. Judgment and thought content normal.   Nursing note and vitals reviewed.            Assessment:       1. Ulcer of left heel and midfoot with necrosis of bone        73 y/o female with left heel ulcer and associated osteomyelitis.  She is s/p debridement on 4/12/19 followed by 6 weeks of IV ceftriaxone and metronidazole.  The wound in the heel is shallow and has a clean base.  No signs of infection.  I will refer her to podiatry to assist with wound care.  No further antibiotics planned.  Plan:       Ulcer of left heel and midfoot with necrosis of bone  -     Ambulatory referral to Podiatry

## 2019-07-02 ENCOUNTER — TELEPHONE (OUTPATIENT)
Dept: PODIATRY | Facility: CLINIC | Age: 74
End: 2019-07-02

## 2019-07-02 ENCOUNTER — OFFICE VISIT (OUTPATIENT)
Dept: PODIATRY | Facility: CLINIC | Age: 74
End: 2019-07-02
Payer: MEDICARE

## 2019-07-02 VITALS — HEIGHT: 66 IN | WEIGHT: 165 LBS | BODY MASS INDEX: 26.52 KG/M2

## 2019-07-02 DIAGNOSIS — B35.1 ONYCHOMYCOSIS DUE TO DERMATOPHYTE: ICD-10-CM

## 2019-07-02 DIAGNOSIS — L97.522 FOOT ULCERATION, LEFT, WITH FAT LAYER EXPOSED: Primary | ICD-10-CM

## 2019-07-02 DIAGNOSIS — L97.512 FOOT ULCERATION, RIGHT, WITH FAT LAYER EXPOSED: ICD-10-CM

## 2019-07-02 PROCEDURE — 99999 PR PBB SHADOW E&M-EST. PATIENT-LVL III: ICD-10-PCS | Mod: PBBFAC,,, | Performed by: PODIATRIST

## 2019-07-02 PROCEDURE — 11042 DBRDMT SUBQ TIS 1ST 20SQCM/<: CPT | Mod: PBBFAC,PO | Performed by: PODIATRIST

## 2019-07-02 PROCEDURE — 99214 PR OFFICE/OUTPT VISIT, EST, LEVL IV, 30-39 MIN: ICD-10-PCS | Mod: S$PBB,25,, | Performed by: PODIATRIST

## 2019-07-02 PROCEDURE — 99214 OFFICE O/P EST MOD 30 MIN: CPT | Mod: S$PBB,25,, | Performed by: PODIATRIST

## 2019-07-02 PROCEDURE — 11042 WOUND DEBRIDEMENT: ICD-10-PCS | Mod: S$PBB,,, | Performed by: PODIATRIST

## 2019-07-02 PROCEDURE — 99999 PR PBB SHADOW E&M-EST. PATIENT-LVL III: CPT | Mod: PBBFAC,,, | Performed by: PODIATRIST

## 2019-07-02 PROCEDURE — 99213 OFFICE O/P EST LOW 20 MIN: CPT | Mod: PBBFAC,PO,25 | Performed by: PODIATRIST

## 2019-07-09 NOTE — PROCEDURES
Wound Debridement  Date/Time: 7/2/2019 10:00 AM  Performed by: Bharti Zaman DPM  Authorized by: Bharti Zaman DPM     Consent Done?:  Yes (Verbal)  Local anesthesia used?: No      Wound Details:    Location:  Left foot    Location:  Left Heel    Type of Debridement:  Excisional       Length (cm):  1       Area (sq cm):  1       Width (cm):  1       Percent Debrided (%):  100       Depth (cm):  0.2       Total Area Debrided (sq cm):  1    Depth of debridement:  Subcutaneous tissue    Tissue debrided:  Dermis    Devitalized tissue debrided:  Fibrin, Biofilm and Callus    Instruments:  Curette    Additional wounds:  1    2nd Wound Details:     Location:  Right foot    Location:  Right Heel    Location:  Right Heel    Type of Debridement:  Excisional       Length (cm):  0.5       Area (sq cm):  0.25       Width (cm):  0.5       Percent Debrided (%):  100       Depth (cm):  0.2       Total Area Debrided (sq cm):  0.25    Depth of debridement:  Subcutaneous tissue    Tissue debrided:  Dermis    Devitalized tissue debrided:  Fibrin, Callus and Biofilm    Instruments:  Curette    Bleeding:  Minimal  Hemostasis Achieved: Yes    Method Used:  Pressure  Patient tolerance:  Patient tolerated the procedure well with no immediate complications

## 2019-07-09 NOTE — PROGRESS NOTES
Subjective:      Patient ID: Keira Starkey is a 74 y.o. female.    Chief Complaint: Wound Care and Wound Check    Keira is a 74 y.o. female who presents to the clinic for evaluation and treatment of high risk feet. Keira has a past medical history of Hypertension. The patient's chief complaint is foot ulcer, B/L foot. Patient currently resides at NH. Patient non verbal, history obtained from chart review and daughter present for visit. S/p Left heel debridement per Dr. Singh DOS: 4/12/19. Daughter reports that she is in town from [place] and she was told the ulceration was healing well.  Patient is in wheelchair. Seen by ID 6/20/19 no further abx.     PCP: Primary Doctor No    Date Last Seen by PCP: none found    Current shoe gear:  Affected Foot: Casual shoes     Unaffected Foot: Casual shoes    History of Trauma: negative  Sign of Infection: none    No results found for: HGBA1C      Review of Systems   Unable to perform ROS: patient nonverbal           Objective:      Physical Exam   Constitutional: She appears well-developed. She is cooperative.   Oriented to time, place, and person.   Cardiovascular:   DP and PT pulses are palpable bilaterally. 3 sec capillary refill time and toes and feet are warm to touch proximally .  There is  hair growth on the feet and toes b/l. There is no edema b/l. No spider veins or varicosities present b/l.      Musculoskeletal:   Equinus noted b/l ankles with < 10 deg DF noted. MMT 5/5 in DF/PF/Inv/Ev resistance with no reproduction of pain in any direction. Passive range of motion of ankle and pedal joints is painless b/l.     Feet:   Right Foot:   Skin Integrity: Negative for callus or dry skin.   Left Foot:   Skin Integrity: Negative for callus or dry skin.   Lymphadenopathy:   Negative lymphadenopathy bilateral popliteal fossa and tarsal tunnel.   Neurological: She is alert.   Light touch, proprioception, and sharp/dull sensation are all intact bilaterally.  Protective threshold with the Stinnett-Wienstein monofilament is intact bilaterally.    Skin:   Ulceration B/L heel see description below.     Toenails 1-5 bilaterally are elongated by 2-3 mm, thickened by 2-3 mm, discolored/yellowed, dystrophic, brittle with subungual debris.     Psychiatric: She has a normal mood and affect.     7/2/19    Wound 2 : Left heel ulceration   Measurement: 0.8cmx0.8cmx0.1cm  pre debridement 7ojg1opc0.2cm post debridement.  Base: red granular   Periwound skin: callus  Drainage: none  Erythema: none  Probe: none, no bone exposed    Left heel s/p debridement       Right heel     Wound 2: Right lateral  heel ulcer   Measurement: 0.3cmxx0.3cmx0.1cmpre debridement 0.5cmx0.5cmx0.2cm post debridement.  Base: red granular   Periwound skin: callus  Drainage: none  Erythema: none  Probe: none, no bone exposed                          Assessment:       Encounter Diagnoses   Name Primary?    Foot ulceration, left, with fat layer exposed Yes    Foot ulceration, right, with fat layer exposed     Onychomycosis due to dermatophyte          Plan:       Keira was seen today for wound care and wound check.    Diagnoses and all orders for this visit:    Foot ulceration, left, with fat layer exposed  -     Ambulatory Referral to Wound Clinic  -     Wound Debridement    Foot ulceration, right, with fat layer exposed  -     Wound Debridement    Onychomycosis due to dermatophyte      I counseled the patient on her conditions, their implications and medical management.    Debridement:see procedure note   Dressings:jazmin   Offloading: cast padding, flexinet.     Follow-up:Patient is to return to the clinic in one week for follow-up but [facility] if any signs of infection, such as fever, chills, sweats, increased redness or pain.    Short-term goals include maintaining good offloading and minimizing bioburden, promoting granulation and epithelialization to healing.  Long-term goals  include keeping the wound healed by good offloading and medical management under the direction of internist.    Utilizing sterile nail nippers and electric , I trimmed nails 1-5 bilaterally down to nail beds to thin the nail plates. Pt. tolerated well. No blood was drawn.    Referral placed to wound care center.     Bharti Zaman DPM

## 2020-04-06 ENCOUNTER — HOSPITAL ENCOUNTER (INPATIENT)
Facility: HOSPITAL | Age: 75
LOS: 6 days | Discharge: HOME | DRG: 871 | End: 2020-04-13
Attending: EMERGENCY MEDICINE | Admitting: FAMILY MEDICINE
Payer: MEDICARE

## 2020-04-06 DIAGNOSIS — T68.XXXA HYPOTHERMIA, INITIAL ENCOUNTER: Primary | ICD-10-CM

## 2020-04-06 DIAGNOSIS — R41.82 ALTERED MENTAL STATUS: ICD-10-CM

## 2020-04-06 DIAGNOSIS — R41.82 AMS (ALTERED MENTAL STATUS): ICD-10-CM

## 2020-04-06 DIAGNOSIS — U07.1 COVID-19 VIRUS INFECTION: ICD-10-CM

## 2020-04-06 LAB
ALBUMIN SERPL BCP-MCNC: 2.1 G/DL (ref 3.5–5.2)
ALLENS TEST: ABNORMAL
ALP SERPL-CCNC: 53 U/L (ref 55–135)
ALT SERPL W/O P-5'-P-CCNC: 24 U/L (ref 10–44)
AMORPH CRY URNS QL MICRO: NORMAL
ANION GAP SERPL CALC-SCNC: 10 MMOL/L (ref 8–16)
AST SERPL-CCNC: 27 U/L (ref 10–40)
BACTERIA #/AREA URNS HPF: NORMAL /HPF
BASOPHILS # BLD AUTO: 0.04 K/UL (ref 0–0.2)
BASOPHILS NFR BLD: 0.6 % (ref 0–1.9)
BILIRUB SERPL-MCNC: 0.1 MG/DL (ref 0.1–1)
BILIRUB UR QL STRIP: NEGATIVE
BNP SERPL-MCNC: 127 PG/ML (ref 0–99)
BUN SERPL-MCNC: 48 MG/DL (ref 8–23)
CALCIUM SERPL-MCNC: 9.4 MG/DL (ref 8.7–10.5)
CHLORIDE SERPL-SCNC: 115 MMOL/L (ref 95–110)
CLARITY UR: CLEAR
CO2 SERPL-SCNC: 17 MMOL/L (ref 23–29)
COLOR UR: YELLOW
CREAT SERPL-MCNC: 1.8 MG/DL (ref 0.5–1.4)
CRP SERPL-MCNC: 69.9 MG/L (ref 0–8.2)
CTP QC/QA: YES
DELSYS: ABNORMAL
DIFFERENTIAL METHOD: ABNORMAL
EOSINOPHIL # BLD AUTO: 0 K/UL (ref 0–0.5)
EOSINOPHIL NFR BLD: 0.3 % (ref 0–8)
ERYTHROCYTE [DISTWIDTH] IN BLOOD BY AUTOMATED COUNT: 14.9 % (ref 11.5–14.5)
EST. GFR  (AFRICAN AMERICAN): 31 ML/MIN/1.73 M^2
EST. GFR  (NON AFRICAN AMERICAN): 27 ML/MIN/1.73 M^2
FERRITIN SERPL-MCNC: 1041 NG/ML (ref 20–300)
GLUCOSE SERPL-MCNC: 173 MG/DL (ref 70–110)
GLUCOSE UR QL STRIP: NEGATIVE
HCO3 UR-SCNC: 22.2 MMOL/L (ref 24–28)
HCT VFR BLD AUTO: 44.9 % (ref 37–48.5)
HGB BLD-MCNC: 13.6 G/DL (ref 12–16)
HGB UR QL STRIP: ABNORMAL
HYALINE CASTS #/AREA URNS LPF: 0 /LPF
IMM GRANULOCYTES # BLD AUTO: 0.25 K/UL (ref 0–0.04)
IMM GRANULOCYTES NFR BLD AUTO: 3.9 % (ref 0–0.5)
KETONES UR QL STRIP: NEGATIVE
LACTATE SERPL-SCNC: 0.8 MMOL/L (ref 0.5–2.2)
LACTATE SERPL-SCNC: 1 MMOL/L (ref 0.5–2.2)
LDH SERPL L TO P-CCNC: 230 U/L (ref 110–260)
LEUKOCYTE ESTERASE UR QL STRIP: NEGATIVE
LIPASE SERPL-CCNC: 104 U/L (ref 4–60)
LYMPHOCYTES # BLD AUTO: 0.9 K/UL (ref 1–4.8)
LYMPHOCYTES NFR BLD: 14.6 % (ref 18–48)
MAGNESIUM SERPL-MCNC: 2.5 MG/DL (ref 1.6–2.6)
MCH RBC QN AUTO: 25.2 PG (ref 27–31)
MCHC RBC AUTO-ENTMCNC: 30.3 G/DL (ref 32–36)
MCV RBC AUTO: 83 FL (ref 82–98)
MICROSCOPIC COMMENT: NORMAL
MONOCYTES # BLD AUTO: 0.5 K/UL (ref 0.3–1)
MONOCYTES NFR BLD: 7.2 % (ref 4–15)
NEUTROPHILS # BLD AUTO: 4.7 K/UL (ref 1.8–7.7)
NEUTROPHILS NFR BLD: 73.4 % (ref 38–73)
NITRITE UR QL STRIP: NEGATIVE
NRBC BLD-RTO: 0 /100 WBC
PCO2 BLDA: 50 MMHG (ref 35–45)
PH SMN: 7.25 [PH] (ref 7.35–7.45)
PH UR STRIP: 5 [PH] (ref 5–8)
PLATELET # BLD AUTO: 185 K/UL (ref 150–350)
PMV BLD AUTO: 9.8 FL (ref 9.2–12.9)
PO2 BLDA: 31 MMHG (ref 40–60)
POC BE: -5 MMOL/L
POC MOLECULAR INFLUENZA A AGN: NEGATIVE
POC MOLECULAR INFLUENZA B AGN: NEGATIVE
POC SATURATED O2: 50 % (ref 95–100)
POC TCO2: 24 MMOL/L (ref 24–29)
POCT GLUCOSE: 156 MG/DL (ref 70–110)
POTASSIUM SERPL-SCNC: 4.3 MMOL/L (ref 3.5–5.1)
PROCALCITONIN SERPL IA-MCNC: 0.16 NG/ML
PROT SERPL-MCNC: 6.4 G/DL (ref 6–8.4)
PROT UR QL STRIP: ABNORMAL
RBC # BLD AUTO: 5.4 M/UL (ref 4–5.4)
RBC #/AREA URNS HPF: 1 /HPF (ref 0–4)
SAMPLE: ABNORMAL
SARS-COV-2 RDRP RESP QL NAA+PROBE: POSITIVE
SITE: ABNORMAL
SODIUM SERPL-SCNC: 142 MMOL/L (ref 136–145)
SP GR UR STRIP: 1.01 (ref 1–1.03)
SQUAMOUS #/AREA URNS HPF: 1 /HPF
TROPONIN I SERPL DL<=0.01 NG/ML-MCNC: 0.01 NG/ML (ref 0–0.03)
TROPONIN I SERPL DL<=0.01 NG/ML-MCNC: <0.006 NG/ML (ref 0–0.03)
URN SPEC COLLECT METH UR: ABNORMAL
UROBILINOGEN UR STRIP-ACNC: NEGATIVE EU/DL
WBC # BLD AUTO: 6.36 K/UL (ref 3.9–12.7)
WBC #/AREA URNS HPF: 3 /HPF (ref 0–5)

## 2020-04-06 PROCEDURE — 87186 SC STD MICRODIL/AGAR DIL: CPT

## 2020-04-06 PROCEDURE — 93010 EKG 12-LEAD: ICD-10-PCS | Mod: ,,, | Performed by: INTERNAL MEDICINE

## 2020-04-06 PROCEDURE — 25000003 PHARM REV CODE 250: Performed by: EMERGENCY MEDICINE

## 2020-04-06 PROCEDURE — 81000 URINALYSIS NONAUTO W/SCOPE: CPT

## 2020-04-06 PROCEDURE — 99291 CRITICAL CARE FIRST HOUR: CPT | Mod: 25

## 2020-04-06 PROCEDURE — 84295 ASSAY OF SERUM SODIUM: CPT

## 2020-04-06 PROCEDURE — U0002 COVID-19 LAB TEST NON-CDC: HCPCS

## 2020-04-06 PROCEDURE — 99292 CRITICAL CARE ADDL 30 MIN: CPT

## 2020-04-06 PROCEDURE — 87502 INFLUENZA DNA AMP PROBE: CPT

## 2020-04-06 PROCEDURE — 87040 BLOOD CULTURE FOR BACTERIA: CPT | Mod: 59

## 2020-04-06 PROCEDURE — 84484 ASSAY OF TROPONIN QUANT: CPT

## 2020-04-06 PROCEDURE — 85025 COMPLETE CBC W/AUTO DIFF WBC: CPT

## 2020-04-06 PROCEDURE — 84484 ASSAY OF TROPONIN QUANT: CPT | Mod: 91

## 2020-04-06 PROCEDURE — 96375 TX/PRO/DX INJ NEW DRUG ADDON: CPT | Mod: 59

## 2020-04-06 PROCEDURE — 83880 ASSAY OF NATRIURETIC PEPTIDE: CPT

## 2020-04-06 PROCEDURE — 96366 THER/PROPH/DIAG IV INF ADDON: CPT | Mod: 59

## 2020-04-06 PROCEDURE — 82728 ASSAY OF FERRITIN: CPT

## 2020-04-06 PROCEDURE — 63600175 PHARM REV CODE 636 W HCPCS: Performed by: EMERGENCY MEDICINE

## 2020-04-06 PROCEDURE — 85014 HEMATOCRIT: CPT

## 2020-04-06 PROCEDURE — 86140 C-REACTIVE PROTEIN: CPT

## 2020-04-06 PROCEDURE — 82803 BLOOD GASES ANY COMBINATION: CPT

## 2020-04-06 PROCEDURE — 82565 ASSAY OF CREATININE: CPT

## 2020-04-06 PROCEDURE — 84132 ASSAY OF SERUM POTASSIUM: CPT

## 2020-04-06 PROCEDURE — 96365 THER/PROPH/DIAG IV INF INIT: CPT | Mod: 59

## 2020-04-06 PROCEDURE — 96367 TX/PROPH/DG ADDL SEQ IV INF: CPT | Mod: 59

## 2020-04-06 PROCEDURE — 83735 ASSAY OF MAGNESIUM: CPT

## 2020-04-06 PROCEDURE — 83605 ASSAY OF LACTIC ACID: CPT | Mod: 91

## 2020-04-06 PROCEDURE — 99900035 HC TECH TIME PER 15 MIN (STAT)

## 2020-04-06 PROCEDURE — 82962 GLUCOSE BLOOD TEST: CPT

## 2020-04-06 PROCEDURE — 83615 LACTATE (LD) (LDH) ENZYME: CPT

## 2020-04-06 PROCEDURE — 93010 ELECTROCARDIOGRAM REPORT: CPT | Mod: ,,, | Performed by: INTERNAL MEDICINE

## 2020-04-06 PROCEDURE — 80053 COMPREHEN METABOLIC PANEL: CPT

## 2020-04-06 PROCEDURE — 83690 ASSAY OF LIPASE: CPT

## 2020-04-06 PROCEDURE — 82800 BLOOD PH: CPT

## 2020-04-06 PROCEDURE — 84145 PROCALCITONIN (PCT): CPT

## 2020-04-06 PROCEDURE — 87077 CULTURE AEROBIC IDENTIFY: CPT

## 2020-04-06 PROCEDURE — 36556 INSERT NON-TUNNEL CV CATH: CPT

## 2020-04-06 PROCEDURE — 82330 ASSAY OF CALCIUM: CPT

## 2020-04-06 PROCEDURE — 93005 ELECTROCARDIOGRAM TRACING: CPT

## 2020-04-06 RX ORDER — MIDAZOLAM HYDROCHLORIDE 1 MG/ML
2 INJECTION INTRAMUSCULAR; INTRAVENOUS
Status: DISCONTINUED | OUTPATIENT
Start: 2020-04-06 | End: 2020-04-06

## 2020-04-06 RX ORDER — ACETAMINOPHEN 650 MG/1
650 SUPPOSITORY RECTAL
Status: COMPLETED | OUTPATIENT
Start: 2020-04-06 | End: 2020-04-06

## 2020-04-06 RX ORDER — ATROPINE SULFATE 0.1 MG/ML
0.5 INJECTION INTRAVENOUS ONCE
Status: COMPLETED | OUTPATIENT
Start: 2020-04-06 | End: 2020-04-06

## 2020-04-06 RX ORDER — ATROPINE SULFATE 0.1 MG/ML
INJECTION INTRAVENOUS
Status: DISPENSED
Start: 2020-04-06 | End: 2020-04-07

## 2020-04-06 RX ADMIN — CEFTRIAXONE 1 G: 1 INJECTION, SOLUTION INTRAVENOUS at 08:04

## 2020-04-06 RX ADMIN — ACETAMINOPHEN 650 MG: 650 SUPPOSITORY RECTAL at 05:04

## 2020-04-06 RX ADMIN — SODIUM BICARBONATE: 84 INJECTION, SOLUTION INTRAVENOUS at 05:04

## 2020-04-06 RX ADMIN — CALCIUM GLUCONATE 1000 MG: 98 INJECTION, SOLUTION INTRAVENOUS at 04:04

## 2020-04-06 RX ADMIN — SODIUM CHLORIDE 500 ML: 0.9 INJECTION, SOLUTION INTRAVENOUS at 04:04

## 2020-04-06 RX ADMIN — ATROPINE SULFATE 0.5 MG: 0.1 INJECTION PARENTERAL at 07:04

## 2020-04-06 RX ADMIN — AZITHROMYCIN MONOHYDRATE 500 MG: 500 INJECTION, POWDER, LYOPHILIZED, FOR SOLUTION INTRAVENOUS at 09:04

## 2020-04-06 NOTE — ED PROVIDER NOTES
Encounter Date: 4/6/2020    SCRIBE #1 NOTE: I, Faby Donnelly, am scribing for, and in the presence of,  Cipriano Quijano MD. I have scribed the following portions of the note - Other sections scribed: HPI, ROS, PE, MDM.       History     Chief Complaint   Patient presents with    Altered Mental Status     per [facility], [place] nursing staff reports pt had change in LOC x2 days; also reports low O2 since this morning     This is a 75 y.o. female nursing home resident with hx of CKD and  demenetia who presents to the Emergency Department via EMS with a cc of altered mental status xPTA. Per EMS NH personnel reports patient has had decreased mental status and decreased interaction with decreased p.o. intake for the past 2 days.  They report that she has had low oxygen saturations beginning this morning.  EMS report on their arrival that they were unable to obtain a blood pressure for the pt and she was bradycardic. EMS personnel states that her oxygen saturation level was 90%, but normalized with nasal cannula.  Further history limited due to acuityof condition.    The history is provided by the EMS personnel and the nursing home. The history is limited by the condition of the patient.     Review of patient's allergies indicates:  No Known Allergies  Past Medical History:   Diagnosis Date    Anemia     Chronic kidney disease     Dementia     Dysphagia     GERD (gastroesophageal reflux disease)     Hemiplegia     Hypertension     Osteoarthritis     Osteomyelitis      Past Surgical History:   Procedure Laterality Date    DEBRIDEMENT OF FOOT Left 4/12/2019    Procedure: DEBRIDEMENT, FOOT, BONE BIOPSY, WOUND VAC PLACEMENT;  Surgeon: Warner Kramer DPM;  Location: [facility] OR;  Service: Podiatry;  Laterality: Left;     History reviewed. No pertinent family history.  Social History     Tobacco Use    Smoking status: Unknown If Ever Smoked   Substance Use Topics    Alcohol use: Not Currently    Drug use: Never      Review of Systems   Unable to perform ROS: Mental status change       Physical Exam     Initial Vitals   BP Pulse Resp Temp SpO2   04/06/20 1731 04/06/20 1514 04/06/20 1514 04/06/20 1514 04/06/20 1514   (!) 124/59 (!) 38 20 (!) 86.9 °F (30.5 °C) 99 %      MAP       --                Physical Exam    Nursing note and vitals reviewed.  Constitutional: She is not diaphoretic. No distress.   HENT:   Head: Normocephalic and atraumatic.   Dry mucous membranes   Eyes: EOM are normal. Pupils are equal, round, and reactive to light. No scleral icterus.   Neck: No JVD present.   Right torticollis   Cardiovascular: Intact distal pulses. An irregular rhythm present.  Bradycardia present.    Pulmonary/Chest: No stridor. No respiratory distress. She has no rales.   Coarse breath sounds bilaterally.   Abdominal: Soft. Bowel sounds are normal. She exhibits no distension. There is no tenderness.   Musculoskeletal: She exhibits no edema or tenderness.   Left-sided flexion contractures   Neurological: She is alert. No cranial nerve deficit or sensory deficit. GCS eye subscore is 4. GCS verbal subscore is 5. GCS motor subscore is 6.   Responsive to physical stimulus.   Skin: Skin is warm and dry.         ED Course   Critical Care  Date/Time: 4/7/2020 12:00 AM  Performed by: Cipriano Quijano MD  Authorized by: Cipriano Quijano MD   Total critical care time (exclusive of procedural time) : 0 minutes  Critical care was necessary to treat or prevent imminent or life-threatening deterioration of the following conditions: cardiac failure, circulatory failure and respiratory failure.  Critical care was time spent personally by me on the following activities: development of treatment plan with patient or surrogate, evaluation of patient's response to treatment, examination of patient, obtaining history from patient or surrogate, ordering and performing treatments and interventions, ordering and review of laboratory studies, ordering and  review of radiographic studies, pulse oximetry, re-evaluation of patient's condition, review of old charts and transcutaneous pacing.    Central Line  Date/Time: 4/6/2020 6:40 PM  Performed by: Cipriano Quijano MD  Consent Done: Emergent Situation  Indications: vascular access  Anesthesia: see MAR for details  Preparation: skin prepped with ChloraPrep  Skin prep agent dried: skin prep agent completely dried prior to procedure  Sterile barriers: all five maximum sterile barriers used - cap, mask, sterile gown, sterile gloves, and large sterile sheet  Hand hygiene: hand hygiene performed prior to central venous catheter insertion  Location details: left femoral  Site selection rationale: poor body habitus for IJ access  Catheter type: triple lumen  Ultrasound guidance: yes  Vessel Caliber: medium, compressibility normal  Needle advanced into vessel with real time Ultrasound guidance.  Guidewire confirmed in vessel.  Number of attempts: 1  Post-procedure assessment: VBG.  Complications: none  Post-procedure: line sutured and chlorhexidine patch        Labs Reviewed   CBC W/ AUTO DIFFERENTIAL - Abnormal; Notable for the following components:       Result Value    Mean Corpuscular Hemoglobin 25.2 (*)     Mean Corpuscular Hemoglobin Conc 30.3 (*)     RDW 14.9 (*)     Immature Granulocytes 3.9 (*)     Immature Grans (Abs) 0.25 (*)     Lymph # 0.9 (*)     Gran% 73.4 (*)     Lymph% 14.6 (*)     All other components within normal limits   COMPREHENSIVE METABOLIC PANEL - Abnormal; Notable for the following components:    Chloride 115 (*)     CO2 17 (*)     Glucose 173 (*)     BUN, Bld 48 (*)     Creatinine 1.8 (*)     Albumin 2.1 (*)     Alkaline Phosphatase 53 (*)     eGFR if  31 (*)     eGFR if non  27 (*)     All other components within normal limits    Narrative:     Recoll. [id number] by LANA at 04/06/2020 16:23, reason:   HEMOLYZED/DISCARDED/DANITA   B-TYPE NATRIURETIC PEPTIDE - Abnormal;  Notable for the following components:     (*)     All other components within normal limits    Narrative:     Recoll. [id number] by LM1 at 04/06/2020 16:23, reason:   HEMOLYZED/DISCARDED/DANITA   URINALYSIS, REFLEX TO URINE CULTURE - Abnormal; Notable for the following components:    Protein, UA 2+ (*)     Occult Blood UA 1+ (*)     All other components within normal limits    Narrative:     Preferred Collection Type->Urine, Catheterized   LIPASE - Abnormal; Notable for the following components:    Lipase 104 (*)     All other components within normal limits    Narrative:     Recoll. [id number] by 1 at 04/06/2020 16:23, reason:   HEMOLYZED/DISCARDED/DANITA   C-REACTIVE PROTEIN - Abnormal; Notable for the following components:    CRP 69.9 (*)     All other components within normal limits    Narrative:     Recoll. [id number] by 1 at 04/06/2020 16:23, reason:   HEMOLYZED/DISCARDED/DANITA   FERRITIN - Abnormal; Notable for the following components:    Ferritin 1,041 (*)     All other components within normal limits    Narrative:     Recoll. [id number] by LM1 at 04/06/2020 16:23, reason:   HEMOLYZED/DISCARDED/DANITA   SARS-COV-2 RNA AMPLIFICATION, QUAL - Abnormal; Notable for the following components:    SARS-CoV-2 RNA, Amplification, Qual Positive (*)     All other components within normal limits    Narrative:     Preferred Collection Type->Urine, Catheterized   POCT GLUCOSE - Abnormal; Notable for the following components:    POCT Glucose 156 (*)     All other components within normal limits   ISTAT PROCEDURE - Abnormal; Notable for the following components:    POC PH 7.254 (*)     POC PCO2 50.0 (*)     POC PO2 31 (*)     POC HCO3 22.2 (*)     POC SATURATED O2 50 (*)     All other components within normal limits   CULTURE, BLOOD   CULTURE, BLOOD   TROPONIN I    Narrative:     Recoll. [id number] by 1 at 04/06/2020 16:23, reason:   HEMOLYZED/DISCARDED/DANITA   LACTIC ACID, PLASMA   MAGNESIUM     Narrative:     Recoll. [id number] by LM1 at 04/06/2020 16:23, reason:   HEMOLYZED/DISCARDED/DANITA   PROCALCITONIN    Narrative:     Recoll. [id number] by LM1 at 04/06/2020 16:23, reason:   HEMOLYZED/DISCARDED/DANITA   LACTATE DEHYDROGENASE    Narrative:     Recoll. [id number] by LM1 at 04/06/2020 16:23, reason:   HEMOLYZED/DISCARDED/DANITA   TROPONIN I   LACTIC ACID, PLASMA   URINALYSIS MICROSCOPIC    Narrative:     Preferred Collection Type->Urine, Catheterized   POCT INFLUENZA A/B MOLECULAR   ISTAT CHEM8     EKG Readings: (Independently Interpreted)   Junctional bradycardia, narrow QRS, normal ST segment, normal to waves, QT prolongation       Imaging Results          CT Head Without Contrast (Final result)  Result time 04/06/20 19:47:07    Final result by Brigido Solis MD (04/06/20 19:47:07)                 Impression:      1. No acute intracranial abnormalities noting sequela of chronic microvascular ischemic change and senescent change.      Electronically signed by: Brigido Solis MD  Date:    04/06/2020  Time:    19:47             Narrative:    EXAMINATION:  CT HEAD WITHOUT CONTRAST    CLINICAL HISTORY:  Confusion/delirium, altered LOC, unexplained;    TECHNIQUE:  Low dose axial images were obtained through the head.  Coronal and sagittal reformations were also performed. Contrast was not administered.    COMPARISON:  09/18/2010    FINDINGS:  There is generalized cerebral volume loss.  There is hypoattenuation in a periventricular fashion, likely sequela of chronic microvascular ischemic change.  There is no evidence of acute major vascular territory infarct, hemorrhage, or mass.  There is no hydrocephalus.  There are no abnormal extra-axial fluid collections.  The paranasal sinuses and mastoid air cells are clear, and there is no evidence of calvarial fracture.  The visualized soft tissues are unremarkable.                               X-Ray Chest AP Portable (Final result)  Result time 04/06/20  16:30:16    Final result by Maddie Saenz MD (04/06/20 16:30:16)                 Impression:      As above      Electronically signed by: Maddie Saenz MD  Date:    04/06/2020  Time:    16:30             Narrative:    EXAMINATION:  XR CHEST AP PORTABLE    CLINICAL HISTORY:  hypoxia;    TECHNIQUE:  Single frontal view of the chest was performed.    COMPARISON:  April 2019    FINDINGS:  Heart size is not enlarged.  Devices external to the patient partly obscure the lower chest.  There is no pleural effusion.  Lungs are underinflated but appear clear.  Osseous structures are unremarkable.                                 Medical Decision Making:   History:   Old Medical Records: I decided to obtain old medical records.  Old Records Summarized: records from clinic visits.       <> Summary of Records: Follows with Nephrology for CKD.  Differential Diagnosis:   Includes but not limited to:  Encephalopathy,electrolyte deficiency, renal failure, thyroid dysfunction, COVID-19 and CVA.  Independently Interpreted Test(s):   I have ordered and independently interpreted EKG Reading(s) - see prior notes  Clinical Tests:   Lab Tests: Ordered and Reviewed  Radiological Study: Ordered and Reviewed  Medical Tests: Ordered and Reviewed  ED Management:  Patient arrived with decreased responsiveness and significantly bradycardic.  Unable to obtain blood pressure.  Patient is responsive to physical stimuli moans with transfer.  Rectal temperature significantly decreased at 86.9.  EKG appears to be junctional bradycardia.  IV access obtained and i-STAT Chem 8 sent.  I-STAT Chem 8 with potassium greater than 9 so patient started on calcium gluconate peripherally as well as bicarbonate infusion.  Point of care glucose is 158.  Due to concern for significant renal failure and possible significant hypoglycemia inflaming was withheld as patient was receiving calcium gluconate and bicarbonate.  Patient became further bradycardic into the  20 so was started on transcutaneous pacing.  Left femoral central line placed due to poor IV access.  Lab sent after central line placed.  Chemistry from central line resulted with a potassium of 4.3 and BUN of 48, creatinine of 1.8.  Unclear if initial lab values were erroneously.  Bicarbonate infusion discontinued.  Patient remains bradycardic.  Given dose of atropine with normalization of heart rate.  Patient sent to CT scan of the brain which did not demonstrate intracranial abnormality.  On reassessment patient has had some improvement in temperature with warming blanket.  She is maintaining a normal heart rate after 1 dose of atropine is no longer requiring pacing.  Bradycardia appears to have been related to patient's hypothermia.  Will admit to Hospital Medicine for further evaluation and management.  This chart was completed using dictation software, as a result there may be some transcription errors.             Scribe Attestation:   Scribe #1: I performed the above scribed service and the documentation accurately describes the services I performed. I attest to the accuracy of the note.            ED Course as of Apr 06 2305   Mon Apr 06, 2020 2301 No inpatient beds available [facility].  Case discussed with transfer center for transfer to where a bed is available however after transfer center  review of chart, determination made that patient will require ICU bed which is not available at a transfer facility that also has full cardiology Capabilities which patient may also require. Patient is to be held at [facility] for now    [SG]      ED Course User Index  [SG] Cipriano Quijano MD                Clinical Impression:       ICD-10-CM ICD-9-CM   1. Hypothermia, initial encounter T68.XXXA 991.6   2. Altered mental status R41.82 780.97   3. AMS (altered mental status) R41.82 780.97   4. COVID-19 virus infection U07.1          Disposition:   Disposition: Admitted  Condition: Fair      Seaniblindsay attestation: I, Cipriano Quijano , personally performed the services described in this documentation. All medical record entries made by the scribe were at my direction and in my presence.  I have reviewed the chart and agree that the record reflects my personal performance and is accurate and complete.                      Cipriano Quijano MD  04/06/20 8156       Cipriano Quijano MD  04/06/20 2306       Cipriano Quijano MD  04/07/20 0142       Cipriano Quijano MD  04/28/20 0930       Cipriano Quijano MD  04/28/20 0985

## 2020-04-07 PROBLEM — G81.90 HEMIPLEGIA: Status: ACTIVE | Noted: 2020-04-07

## 2020-04-07 PROBLEM — T68.XXXA HYPOTHERMIA: Status: ACTIVE | Noted: 2020-04-07

## 2020-04-07 PROBLEM — R00.1 BRADYCARDIA: Status: ACTIVE | Noted: 2020-04-07

## 2020-04-07 PROBLEM — U07.1 COVID-19: Status: ACTIVE | Noted: 2020-04-07

## 2020-04-07 LAB
ALBUMIN SERPL BCP-MCNC: 2.2 G/DL (ref 3.5–5.2)
ALP SERPL-CCNC: 66 U/L (ref 55–135)
ALT SERPL W/O P-5'-P-CCNC: 31 U/L (ref 10–44)
ANION GAP SERPL CALC-SCNC: 10 MMOL/L (ref 8–16)
AST SERPL-CCNC: 32 U/L (ref 10–40)
BASOPHILS # BLD AUTO: ABNORMAL K/UL (ref 0–0.2)
BASOPHILS NFR BLD: 0 % (ref 0–1.9)
BILIRUB SERPL-MCNC: 0.1 MG/DL (ref 0.1–1)
BUN SERPL-MCNC: 49 MG/DL (ref 8–23)
CALCIUM SERPL-MCNC: 8.9 MG/DL (ref 8.7–10.5)
CHLORIDE SERPL-SCNC: 114 MMOL/L (ref 95–110)
CO2 SERPL-SCNC: 18 MMOL/L (ref 23–29)
CREAT SERPL-MCNC: 2.1 MG/DL (ref 0.5–1.4)
DIFFERENTIAL METHOD: ABNORMAL
EOSINOPHIL # BLD AUTO: ABNORMAL K/UL (ref 0–0.5)
EOSINOPHIL NFR BLD: 0 % (ref 0–8)
ERYTHROCYTE [DISTWIDTH] IN BLOOD BY AUTOMATED COUNT: 14.9 % (ref 11.5–14.5)
EST. GFR  (AFRICAN AMERICAN): 26 ML/MIN/1.73 M^2
EST. GFR  (NON AFRICAN AMERICAN): 22 ML/MIN/1.73 M^2
GLUCOSE SERPL-MCNC: 99 MG/DL (ref 70–110)
HCT VFR BLD AUTO: 38 % (ref 37–48.5)
HGB BLD-MCNC: 11.7 G/DL (ref 12–16)
IMM GRANULOCYTES # BLD AUTO: ABNORMAL K/UL (ref 0–0.04)
IMM GRANULOCYTES NFR BLD AUTO: ABNORMAL % (ref 0–0.5)
LIPASE SERPL-CCNC: 164 U/L (ref 4–60)
LYMPHOCYTES # BLD AUTO: ABNORMAL K/UL (ref 1–4.8)
LYMPHOCYTES NFR BLD: 14 % (ref 18–48)
MCH RBC QN AUTO: 25 PG (ref 27–31)
MCHC RBC AUTO-ENTMCNC: 30.8 G/DL (ref 32–36)
MCV RBC AUTO: 81 FL (ref 82–98)
METAMYELOCYTES NFR BLD MANUAL: 2 %
MONOCYTES # BLD AUTO: ABNORMAL K/UL (ref 0.3–1)
MONOCYTES NFR BLD: 6 % (ref 4–15)
MYELOCYTES NFR BLD MANUAL: 4 %
NEUTROPHILS NFR BLD: 74 % (ref 38–73)
NRBC BLD-RTO: 1 /100 WBC
PLATELET # BLD AUTO: 235 K/UL (ref 150–350)
PMV BLD AUTO: 9.5 FL (ref 9.2–12.9)
POTASSIUM SERPL-SCNC: 4.7 MMOL/L (ref 3.5–5.1)
PROT SERPL-MCNC: 6.8 G/DL (ref 6–8.4)
RBC # BLD AUTO: 4.68 M/UL (ref 4–5.4)
SODIUM SERPL-SCNC: 142 MMOL/L (ref 136–145)
WBC # BLD AUTO: 8.44 K/UL (ref 3.9–12.7)

## 2020-04-07 PROCEDURE — 21400001 HC TELEMETRY ROOM

## 2020-04-07 PROCEDURE — 27000221 HC OXYGEN, UP TO 24 HOURS

## 2020-04-07 PROCEDURE — C9113 INJ PANTOPRAZOLE SODIUM, VIA: HCPCS | Performed by: EMERGENCY MEDICINE

## 2020-04-07 PROCEDURE — 83690 ASSAY OF LIPASE: CPT

## 2020-04-07 PROCEDURE — 80053 COMPREHEN METABOLIC PANEL: CPT

## 2020-04-07 PROCEDURE — 94761 N-INVAS EAR/PLS OXIMETRY MLT: CPT

## 2020-04-07 PROCEDURE — 63600175 PHARM REV CODE 636 W HCPCS: Performed by: EMERGENCY MEDICINE

## 2020-04-07 PROCEDURE — 85027 COMPLETE CBC AUTOMATED: CPT

## 2020-04-07 PROCEDURE — 63600175 PHARM REV CODE 636 W HCPCS: Performed by: FAMILY MEDICINE

## 2020-04-07 PROCEDURE — 25000003 PHARM REV CODE 250: Performed by: FAMILY MEDICINE

## 2020-04-07 PROCEDURE — 85007 BL SMEAR W/DIFF WBC COUNT: CPT

## 2020-04-07 RX ORDER — HEPARIN SODIUM 5000 [USP'U]/ML
5000 INJECTION, SOLUTION INTRAVENOUS; SUBCUTANEOUS EVERY 8 HOURS
Status: DISCONTINUED | OUTPATIENT
Start: 2020-04-07 | End: 2020-04-14 | Stop reason: HOSPADM

## 2020-04-07 RX ORDER — ACETAMINOPHEN 325 MG/1
650 TABLET ORAL EVERY 6 HOURS PRN
Status: DISCONTINUED | OUTPATIENT
Start: 2020-04-07 | End: 2020-04-14 | Stop reason: HOSPADM

## 2020-04-07 RX ORDER — ONDANSETRON 2 MG/ML
4 INJECTION INTRAMUSCULAR; INTRAVENOUS EVERY 8 HOURS PRN
Status: DISCONTINUED | OUTPATIENT
Start: 2020-04-07 | End: 2020-04-14 | Stop reason: HOSPADM

## 2020-04-07 RX ORDER — SODIUM CHLORIDE 0.9 % (FLUSH) 0.9 %
10 SYRINGE (ML) INJECTION
Status: DISCONTINUED | OUTPATIENT
Start: 2020-04-07 | End: 2020-04-14 | Stop reason: HOSPADM

## 2020-04-07 RX ORDER — SODIUM CHLORIDE 9 MG/ML
INJECTION, SOLUTION INTRAVENOUS CONTINUOUS
Status: ACTIVE | OUTPATIENT
Start: 2020-04-07 | End: 2020-04-07

## 2020-04-07 RX ORDER — HYDRALAZINE HYDROCHLORIDE 20 MG/ML
10 INJECTION INTRAMUSCULAR; INTRAVENOUS EVERY 8 HOURS PRN
Status: DISCONTINUED | OUTPATIENT
Start: 2020-04-07 | End: 2020-04-14 | Stop reason: HOSPADM

## 2020-04-07 RX ORDER — PANTOPRAZOLE SODIUM 40 MG/10ML
40 INJECTION, POWDER, LYOPHILIZED, FOR SOLUTION INTRAVENOUS DAILY
Status: DISCONTINUED | OUTPATIENT
Start: 2020-04-07 | End: 2020-04-14 | Stop reason: HOSPADM

## 2020-04-07 RX ADMIN — CEFTRIAXONE 1 G: 1 INJECTION, POWDER, FOR SOLUTION INTRAMUSCULAR; INTRAVENOUS at 09:04

## 2020-04-07 RX ADMIN — VANCOMYCIN HYDROCHLORIDE 1500 MG: 1.5 INJECTION, POWDER, LYOPHILIZED, FOR SOLUTION INTRAVENOUS at 11:04

## 2020-04-07 RX ADMIN — PANTOPRAZOLE SODIUM 40 MG: 40 INJECTION, POWDER, FOR SOLUTION INTRAVENOUS at 08:04

## 2020-04-07 RX ADMIN — HEPARIN SODIUM 5000 UNITS: 5000 INJECTION INTRAVENOUS; SUBCUTANEOUS at 09:04

## 2020-04-07 RX ADMIN — SODIUM CHLORIDE: 0.9 INJECTION, SOLUTION INTRAVENOUS at 08:04

## 2020-04-07 RX ADMIN — HEPARIN SODIUM 5000 UNITS: 5000 INJECTION INTRAVENOUS; SUBCUTANEOUS at 08:04

## 2020-04-07 RX ADMIN — AZITHROMYCIN MONOHYDRATE 500 MG: 500 INJECTION, POWDER, LYOPHILIZED, FOR SOLUTION INTRAVENOUS at 09:04

## 2020-04-07 RX ADMIN — HEPARIN SODIUM 5000 UNITS: 5000 INJECTION INTRAVENOUS; SUBCUTANEOUS at 02:04

## 2020-04-07 NOTE — ED NOTES
MD made aware of temp. Improvement since arrival (mild). Continue bear hugger and to monitor per MD.

## 2020-04-07 NOTE — H&P
[facility] Hospital Medicine  History & Physical    Patient Name: Keira Starkey  MRN: [id number]  Admission Date: 4/6/2020  Attending Physician: Cipriano Quijano MD   Primary Care Provider: Primary Doctor No         Patient information was obtained from ER records.     Subjective:     Principal Problem:<principal problem not specified>    Chief Complaint:   Chief Complaint   Patient presents with    Altered Mental Status     per [facility], [place] nursing staff reports pt had change in LOC x2 days; also reports low O2 since this morning        HPI: Pt is a 76 yo M resident of a nursing home with a h/o CKD, Dementia, HTN, hemiplegia, GERD, OA who presents to the ER from nursing home for AMS and hypoxia.  Pt has been having  A decreased mental state x 2 days along with poor appetite. When EMS arrived they could not get a blood pressure per the ED physician and HR was noted to be as low is 20-30's beats per minute.  In the ED pt was also noted to be hypothermic and manual BP was ~100 systolic.  Pt was placd in ED and given atropine.  After rewarming pt's HR did come up to the 60's.  Pt was tested for COVID and was positive.  Pt was started on oxygen support and antibiotics.      Past Medical History:   Diagnosis Date    Anemia     Chronic kidney disease     Dementia     Dysphagia     GERD (gastroesophageal reflux disease)     Hemiplegia     Hypertension     Osteoarthritis     Osteomyelitis        Past Surgical History:   Procedure Laterality Date    DEBRIDEMENT OF FOOT Left 4/12/2019    Procedure: DEBRIDEMENT, FOOT, BONE BIOPSY, WOUND VAC PLACEMENT;  Surgeon: Warner Kramer DPM;  Location: [facility];  Service: Podiatry;  Laterality: Left;       Review of patient's allergies indicates:  No Known Allergies    No current facility-administered medications on file prior to encounter.      Current Outpatient Medications on File Prior to Encounter   Medication Sig    ARIPiprazole (ABILIFY)  15 MG Tab Take 15 mg by mouth nightly.     ascorbic acid, vitamin C, (VITAMIN C) 100 MG tablet Take 500 mg by mouth once daily.     carvedilol (COREG) 6.25 MG tablet Take 6.25 mg by mouth 2 (two) times daily with meals.    donepezil (ARICEPT) 10 MG tablet Take 10 mg by mouth every evening.    escitalopram oxalate (LEXAPRO) 10 MG tablet Take 15 mg by mouth once daily.    fluticasone (FLONASE) 50 mcg/actuation nasal spray 1 spray by Each Nare route once daily.    heparin sod,porcine/0.9 % NaCl (HEPARIN, PORCINE, IN 0.9% NACL) 10,000 unit/1,000 mL SolP Inject into the vein.    multivitamin capsule Take 1 capsule by mouth once daily.    protein supplement Liqd Take by mouth.    ranitidine (ZANTAC) 150 MG tablet Take 150 mg by mouth once daily.     traZODone (DESYREL) 50 MG tablet Take 25 mg by mouth every evening.    vitamin D (VITAMIN D3) 1000 units Tab Take 1,000 Units by mouth once daily.    zinc sulfate (ZINCATE) 220 (50) mg capsule Take 220 mg by mouth once daily.     Family History     None        Tobacco Use    Smoking status: Unknown If Ever Smoked   Substance and Sexual Activity    Alcohol use: Not Currently    Drug use: Never    Sexual activity: Not on file     Review of Systems   Unable to perform ROS: Dementia     Objective:     Vital Signs (Most Recent):  Temp: (!) 94 °F (34.4 °C) (04/07/20 0213)  Pulse: 66 (04/07/20 0531)  Resp: (!) 27 (04/07/20 0431)  BP: (!) 151/69 (04/07/20 0531)  SpO2: 100 % (04/07/20 0531) Vital Signs (24h Range):  Temp:  [86.9 °F (30.5 °C)-94 °F (34.4 °C)] 94 °F (34.4 °C)  Pulse:  [31-80] 66  Resp:  [18-57] 27  SpO2:  [84 %-100 %] 100 %  BP: (112-224)/() 151/69     Weight: 74.8 kg (165 lb)  Body mass index is 32.22 kg/m².    Physical Exam   Constitutional: No distress.   Dry MM   HENT:   Head: Normocephalic and atraumatic.   Eyes: Pupils are equal, round, and reactive to light. EOM are normal.   Neck: No tracheal deviation present.   Contracture    Cardiovascular: Regular rhythm.   bradycardic   Pulmonary/Chest: She has rales.   Coarse breath sounds   Abdominal: Soft. Bowel sounds are normal.   Musculoskeletal: Normal range of motion. She exhibits no deformity.   Neurological: She displays abnormal reflex. She exhibits abnormal muscle tone.   Skin: Skin is warm. Capillary refill takes 2 to 3 seconds. She is not diaphoretic. No pallor.   Psychiatric:   Altered, confused   Vitals reviewed.        Assessment/Plan:     COVID-19  - COVID-19 testing   - Infection Control notified     - Isolation:   - Airborne, Contact and Droplet Precautions  - Cohort patients into COVID units  - N95 masks must be fit tested, wear eye protection  - 20 second hand hygiene              - Limit visitors per hospital policy              - Consolidating lab draws, nursing care, provider visits, and interventions    - Diagnostics: (leukopenia, hyponatremia, hyperferritinemia, elevated troponin, elevated d-dimer, age, and comorbidities are significant predictors of poor clinical outcome)  CBC, CMP and Portable CXR    - Management:  Supplemental O2 to maintain SpO2 >92%  Continuous/intermittent Pulse Ox  Albuterol INHALER PRN (avoid nebulization of secretions)  Avoiding BiPAP to prevent aerosolization (including home BiPAP)                   Bradycardia  2/2 hypothermia.  HR down to 20-30 per ED physician.  Improving to 60's from rewarming.      Hypothermia  2/2 sepsis from COVID.  Warming blanket, temp improving      Hemiplegia  Chronic, with left sided contractures      Dementia with behavioral disturbance  Presented with acute delirium.  Has underlying dementia.  Monitor.        GERD (gastroesophageal reflux disease)  IV protonix      CKD stage 3  Chronic, monitor      Essential hypertension  Chronic, monitor. PRN antihypertensives        VTE Risk Mitigation (From admission, onward)         Ordered     heparin (porcine) injection 5,000 Units  Every 8 hours      04/07/20 0532     IP VTE  HIGH RISK PATIENT  Once      04/07/20 0530     Place sequential compression device  Until discontinued      04/07/20 0530                   Kareem Marte MD  Department of Hospital Medicine   [facility]

## 2020-04-07 NOTE — ED NOTES
Report received. Patient seen and examined. Patient currently braycardic requiring pacing. Discussed with MD and atropine ordered/to be given.     LOC: The patient is stuporous   APPEARANCE: frail chroniclly ill appearing female.   SKIN: The skin is warm and dry, color consistent with ethnicity, patient has normal skin turgor and moist mucus membranes, skin intact, has dressing to left heal noted (not removed at this time)  no other  breakdown or bruising noted to visible skin  MUSCULOSKELETAL: no obvious swelling or acute deformities noted. Chronic general atrophy and contractors. General tremors/spasms   RESPIRATORY: Airway is open and patent, respirations are spontaneous, patient has a mild decreased effort and mild increase rate, no accessory muscle use noted, bilateral breath sounds course to auscultation. No overt distress noted.  CARDIAC: Patient has a slow rate in 30s requringing pacing. External Pacer Pads and paced to 70. Atropine to be admin and reassess pacing need. no periphreal edema noted, capillary refill > 3 seconds.  ABDOMEN: Soft and non tender to palpation, no distention noted, normoactive bowel sounds present in all four quadrants. [place], clear yellow urine and patent.   NEUROLOGIC: PERRL, 3 mm bilaterally, stuporous   PSCYH: no irritation or agitation.

## 2020-04-07 NOTE — ED NOTES
No longer pacing and maintatining HR in 70s post atropine. BP elevated likely atropine response. MD notified and aware. Continue to monitor and to transport patient to CT for stat Head. Instructed to hold versed.  Patient more alert at this time, appears aware of environment and tracking staff in room. Non verbal thus far, ?likely patients baseline and given her chronic appearance.

## 2020-04-07 NOTE — ED NOTES
LOC: The patient is Alert, appears oriented to self and environment. Tracks staff.    APPEARANCE: frail chroniclly ill appearing female.   SKIN: The skin is warm and dry, color consistent with ethnicity, patient has normal skin turgor and moist mucus membranes, skin intact, has dressing to left heal noted (not removed at this time)  no other  breakdown or bruising noted to visible skin  MUSCULOSKELETAL: no obvious swelling or acute deformities noted. Chronic general atrophy and contractors. General tremors/spasms   RESPIRATORY: Airway is open and patent, respirations are spontaneous, patient has a mild decreased effort and mild increase rate, no accessory muscle use noted, bilateral breath sounds course to auscultation. No overt distress noted. On 5 L NC  CARDIAC:normal rate and rhythem no periphreal edema noted, capillary refill > 3 seconds.  ABDOMEN: Soft and non tender to palpation, no distention noted, normoactive bowel sounds present in all four quadrants. [place], clear yellow urine and patent.   NEUROLOGIC: PERRL, 3 mm bilaterally, alert. ?non verbal. The patient is Alert, appears oriented to self and environment. Tracks staff.    PSCYH: no irritation or agitation.

## 2020-04-07 NOTE — SUBJECTIVE & OBJECTIVE
Past Medical History:   Diagnosis Date    Anemia     Chronic kidney disease     Dementia     Dysphagia     GERD (gastroesophageal reflux disease)     Hemiplegia     Hypertension     Osteoarthritis     Osteomyelitis        Past Surgical History:   Procedure Laterality Date    DEBRIDEMENT OF FOOT Left 4/12/2019    Procedure: DEBRIDEMENT, FOOT, BONE BIOPSY, WOUND VAC PLACEMENT;  Surgeon: Warner Kramer DPM;  Location: [facility];  Service: Podiatry;  Laterality: Left;       Review of patient's allergies indicates:  No Known Allergies    No current facility-administered medications on file prior to encounter.      Current Outpatient Medications on File Prior to Encounter   Medication Sig    ARIPiprazole (ABILIFY) 15 MG Tab Take 15 mg by mouth nightly.     ascorbic acid, vitamin C, (VITAMIN C) 100 MG tablet Take 500 mg by mouth once daily.     carvedilol (COREG) 6.25 MG tablet Take 6.25 mg by mouth 2 (two) times daily with meals.    donepezil (ARICEPT) 10 MG tablet Take 10 mg by mouth every evening.    escitalopram oxalate (LEXAPRO) 10 MG tablet Take 15 mg by mouth once daily.    fluticasone (FLONASE) 50 mcg/actuation nasal spray 1 spray by Each Nare route once daily.    heparin sod,porcine/0.9 % NaCl (HEPARIN, PORCINE, IN 0.9% NACL) 10,000 unit/1,000 mL SolP Inject into the vein.    multivitamin capsule Take 1 capsule by mouth once daily.    protein supplement Liqd Take by mouth.    ranitidine (ZANTAC) 150 MG tablet Take 150 mg by mouth once daily.     traZODone (DESYREL) 50 MG tablet Take 25 mg by mouth every evening.    vitamin D (VITAMIN D3) 1000 units Tab Take 1,000 Units by mouth once daily.    zinc sulfate (ZINCATE) 220 (50) mg capsule Take 220 mg by mouth once daily.     Family History     None        Tobacco Use    Smoking status: Unknown If Ever Smoked   Substance and Sexual Activity    Alcohol use: Not Currently    Drug use: Never    Sexual activity: Not on file     Review of  Systems   Unable to perform ROS: Dementia     Objective:     Vital Signs (Most Recent):  Temp: (!) 94 °F (34.4 °C) (04/07/20 0213)  Pulse: 66 (04/07/20 0531)  Resp: (!) 27 (04/07/20 0431)  BP: (!) 151/69 (04/07/20 0531)  SpO2: 100 % (04/07/20 0531) Vital Signs (24h Range):  Temp:  [86.9 °F (30.5 °C)-94 °F (34.4 °C)] 94 °F (34.4 °C)  Pulse:  [31-80] 66  Resp:  [18-57] 27  SpO2:  [84 %-100 %] 100 %  BP: (112-224)/() 151/69     Weight: 74.8 kg (165 lb)  Body mass index is 32.22 kg/m².    Physical Exam   Constitutional: No distress.   Dry MM   HENT:   Head: Normocephalic and atraumatic.   Eyes: Pupils are equal, round, and reactive to light. EOM are normal.   Neck: No tracheal deviation present.   Contracture   Cardiovascular: Regular rhythm.   bradycardic   Pulmonary/Chest: She has rales.   Coarse breath sounds   Abdominal: Soft. Bowel sounds are normal.   Musculoskeletal: Normal range of motion. She exhibits no deformity.   Neurological: She displays abnormal reflex. She exhibits abnormal muscle tone.   Skin: Skin is warm. Capillary refill takes 2 to 3 seconds. She is not diaphoretic. No pallor.   Psychiatric:   Altered, confused   Vitals reviewed.

## 2020-04-07 NOTE — HPI
"Per Dr. Kareem Marte:    "Pt is a 76 yo M resident of a nursing home with a h/o CKD, Dementia, HTN, hemiplegia, GERD, OA who presents to the ER from nursing home for AMS and hypoxia.  Pt has been having  A decreased mental state x 2 days along with poor appetite. When EMS arrived they could not get a blood pressure per the ED physician and HR was noted to be as low is 20-30's beats per minute.  In the ED pt was also noted to be hypothermic and manual BP was ~100 systolic.  Pt was placd in ED and given atropine.  After rewarming pt's HR did come up to the 60's.  Pt was tested for COVID and was positive.  Pt was started on oxygen support and antibiotics."  "

## 2020-04-07 NOTE — ASSESSMENT & PLAN NOTE
- COVID-19 testing   - Infection Control notified     - Isolation:   - Airborne, Contact and Droplet Precautions  - Cohort patients into COVID units  - N95 masks must be fit tested, wear eye protection  - 20 second hand hygiene              - Limit visitors per hospital policy              - Consolidating lab draws, nursing care, provider visits, and interventions    - Diagnostics: (leukopenia, hyponatremia, hyperferritinemia, elevated troponin, elevated d-dimer, age, and comorbidities are significant predictors of poor clinical outcome)  CBC, CMP and Portable CXR    - Management:  Supplemental O2 to maintain SpO2 >92%  Continuous/intermittent Pulse Ox  Albuterol INHALER PRN (avoid nebulization of secretions)  Avoiding BiPAP to prevent aerosolization (including home BiPAP)

## 2020-04-07 NOTE — NURSING
Patient's temp 92.3 rectally and cool to touch. Blankets placed on patient and I have asked my charge RN to assist with getting patient a quentin hugger. I have messaged Dr. iPerre.

## 2020-04-07 NOTE — NURSING
Received in report this morning that patient is nonverbal and not responsive. Upon my assessment patient is not verbal and does not respond much at all. When patient is touched she has tremors; might open her eyes temporarily.

## 2020-04-07 NOTE — ED NOTES
The patient is resting quietly, eyes closed, arouses easily to stimuli. Airway is open and patent, respirations are spontaneous, normal respiratory effort and rate noted, skin warm and dry, appearance: in no acute distress and resting comfortably. No overall change in patient condition. Continue to monitor. Awaiting disposition.

## 2020-04-07 NOTE — ED NOTES
Patient remains on continuous cardiac monitor, automatic blood pressure cuff and continuous pulse oximeter. Bear huger in place.  Braycardic on monitor

## 2020-04-07 NOTE — NURSING
"I spoke with patient's daughter, Adela Thomas, and updated her on patient's current status. Adela informed me her mother's baseline is nonverbal, but will respond "if she likes you". Adela did inform me that the tremors her mother is experiencing when we touch her is not her baseline and she has never seen that. I will alert the attending.   "

## 2020-04-08 PROBLEM — K21.9 GERD (GASTROESOPHAGEAL REFLUX DISEASE): Chronic | Status: RESOLVED | Noted: 2019-04-11 | Resolved: 2020-04-08

## 2020-04-08 PROBLEM — N18.30 ACUTE RENAL FAILURE SUPERIMPOSED ON STAGE 3 CHRONIC KIDNEY DISEASE: Status: ACTIVE | Noted: 2019-04-11

## 2020-04-08 PROBLEM — G40.909 EPILEPSY: Status: ACTIVE | Noted: 2019-01-29

## 2020-04-08 PROBLEM — N17.9 ACUTE RENAL FAILURE SUPERIMPOSED ON STAGE 3 CHRONIC KIDNEY DISEASE: Status: ACTIVE | Noted: 2019-04-11

## 2020-04-08 PROBLEM — T68.XXXA HYPOTHERMIA: Status: RESOLVED | Noted: 2020-04-07 | Resolved: 2020-04-08

## 2020-04-08 PROBLEM — R00.1 BRADYCARDIA: Status: RESOLVED | Noted: 2020-04-07 | Resolved: 2020-04-08

## 2020-04-08 PROBLEM — G93.41 ACUTE METABOLIC ENCEPHALOPATHY: Status: ACTIVE | Noted: 2020-04-08

## 2020-04-08 LAB
ALBUMIN SERPL BCP-MCNC: 2.1 G/DL (ref 3.5–5.2)
ALLENS TEST: ABNORMAL
ALP SERPL-CCNC: 59 U/L (ref 55–135)
ALT SERPL W/O P-5'-P-CCNC: 26 U/L (ref 10–44)
ANION GAP SERPL CALC-SCNC: 13 MMOL/L (ref 8–16)
AST SERPL-CCNC: 27 U/L (ref 10–40)
BASOPHILS # BLD AUTO: ABNORMAL K/UL (ref 0–0.2)
BASOPHILS NFR BLD: 0 % (ref 0–1.9)
BILIRUB SERPL-MCNC: 0.2 MG/DL (ref 0.1–1)
BUN SERPL-MCNC: 47 MG/DL (ref 8–23)
CALCIUM SERPL-MCNC: 8.7 MG/DL (ref 8.7–10.5)
CHLORIDE SERPL-SCNC: 118 MMOL/L (ref 95–110)
CO2 SERPL-SCNC: 15 MMOL/L (ref 23–29)
CREAT SERPL-MCNC: 2.7 MG/DL (ref 0.5–1.4)
DELSYS: ABNORMAL
DIFFERENTIAL METHOD: ABNORMAL
EOSINOPHIL # BLD AUTO: ABNORMAL K/UL (ref 0–0.5)
EOSINOPHIL NFR BLD: 1 % (ref 0–8)
ERYTHROCYTE [DISTWIDTH] IN BLOOD BY AUTOMATED COUNT: 14.8 % (ref 11.5–14.5)
EST. GFR  (AFRICAN AMERICAN): 19 ML/MIN/1.73 M^2
EST. GFR  (NON AFRICAN AMERICAN): 17 ML/MIN/1.73 M^2
FLOW: 2
GLUCOSE SERPL-MCNC: 62 MG/DL (ref 70–110)
HCO3 UR-SCNC: 15.8 MMOL/L (ref 24–28)
HCT VFR BLD AUTO: 36.4 % (ref 37–48.5)
HGB BLD-MCNC: 11.4 G/DL (ref 12–16)
IMM GRANULOCYTES # BLD AUTO: ABNORMAL K/UL (ref 0–0.04)
IMM GRANULOCYTES NFR BLD AUTO: ABNORMAL % (ref 0–0.5)
LYMPHOCYTES # BLD AUTO: ABNORMAL K/UL (ref 1–4.8)
LYMPHOCYTES NFR BLD: 35 % (ref 18–48)
MCH RBC QN AUTO: 25.6 PG (ref 27–31)
MCHC RBC AUTO-ENTMCNC: 31.3 G/DL (ref 32–36)
MCV RBC AUTO: 82 FL (ref 82–98)
METAMYELOCYTES NFR BLD MANUAL: 2 %
MODE: ABNORMAL
MONOCYTES # BLD AUTO: ABNORMAL K/UL (ref 0.3–1)
MONOCYTES NFR BLD: 5 % (ref 4–15)
MYELOCYTES NFR BLD MANUAL: 6 %
NEUTROPHILS NFR BLD: 51 % (ref 38–73)
NRBC BLD-RTO: 1 /100 WBC
PCO2 BLDA: 21.7 MMHG (ref 35–45)
PH SMN: 7.47 [PH] (ref 7.35–7.45)
PLATELET # BLD AUTO: 200 K/UL (ref 150–350)
PMV BLD AUTO: 9.3 FL (ref 9.2–12.9)
PO2 BLDA: 60 MMHG (ref 80–100)
POC BE: -6 MMOL/L
POC SATURATED O2: 93 % (ref 95–100)
POC TCO2: 16 MMOL/L (ref 23–27)
POTASSIUM SERPL-SCNC: 4.5 MMOL/L (ref 3.5–5.1)
PROT SERPL-MCNC: 6.6 G/DL (ref 6–8.4)
RBC # BLD AUTO: 4.46 M/UL (ref 4–5.4)
SAMPLE: ABNORMAL
SITE: ABNORMAL
SODIUM SERPL-SCNC: 146 MMOL/L (ref 136–145)
SP02: 90
WBC # BLD AUTO: 9.6 K/UL (ref 3.9–12.7)

## 2020-04-08 PROCEDURE — 63600175 PHARM REV CODE 636 W HCPCS: Performed by: HOSPITALIST

## 2020-04-08 PROCEDURE — 36415 COLL VENOUS BLD VENIPUNCTURE: CPT

## 2020-04-08 PROCEDURE — 36600 WITHDRAWAL OF ARTERIAL BLOOD: CPT

## 2020-04-08 PROCEDURE — 63600175 PHARM REV CODE 636 W HCPCS: Performed by: FAMILY MEDICINE

## 2020-04-08 PROCEDURE — 99222 1ST HOSP IP/OBS MODERATE 55: CPT | Mod: ,,, | Performed by: PSYCHIATRY & NEUROLOGY

## 2020-04-08 PROCEDURE — 21400001 HC TELEMETRY ROOM

## 2020-04-08 PROCEDURE — 80053 COMPREHEN METABOLIC PANEL: CPT

## 2020-04-08 PROCEDURE — 85027 COMPLETE CBC AUTOMATED: CPT

## 2020-04-08 PROCEDURE — 25000003 PHARM REV CODE 250: Performed by: INTERNAL MEDICINE

## 2020-04-08 PROCEDURE — C9113 INJ PANTOPRAZOLE SODIUM, VIA: HCPCS | Performed by: EMERGENCY MEDICINE

## 2020-04-08 PROCEDURE — 63600175 PHARM REV CODE 636 W HCPCS: Performed by: INTERNAL MEDICINE

## 2020-04-08 PROCEDURE — 99222 PR INITIAL HOSPITAL CARE,LEVL II: ICD-10-PCS | Mod: ,,, | Performed by: PSYCHIATRY & NEUROLOGY

## 2020-04-08 PROCEDURE — 82803 BLOOD GASES ANY COMBINATION: CPT

## 2020-04-08 PROCEDURE — 85007 BL SMEAR W/DIFF WBC COUNT: CPT

## 2020-04-08 PROCEDURE — 63600175 PHARM REV CODE 636 W HCPCS: Performed by: EMERGENCY MEDICINE

## 2020-04-08 PROCEDURE — 99900035 HC TECH TIME PER 15 MIN (STAT)

## 2020-04-08 RX ORDER — SODIUM CHLORIDE, SODIUM LACTATE, POTASSIUM CHLORIDE, CALCIUM CHLORIDE 600; 310; 30; 20 MG/100ML; MG/100ML; MG/100ML; MG/100ML
INJECTION, SOLUTION INTRAVENOUS CONTINUOUS
Status: DISCONTINUED | OUTPATIENT
Start: 2020-04-08 | End: 2020-04-08

## 2020-04-08 RX ADMIN — AZITHROMYCIN MONOHYDRATE 500 MG: 500 INJECTION, POWDER, LYOPHILIZED, FOR SOLUTION INTRAVENOUS at 09:04

## 2020-04-08 RX ADMIN — PANTOPRAZOLE SODIUM 40 MG: 40 INJECTION, POWDER, FOR SOLUTION INTRAVENOUS at 09:04

## 2020-04-08 RX ADMIN — CEFTRIAXONE 1 G: 1 INJECTION, POWDER, FOR SOLUTION INTRAMUSCULAR; INTRAVENOUS at 07:04

## 2020-04-08 RX ADMIN — HEPARIN SODIUM 5000 UNITS: 5000 INJECTION INTRAVENOUS; SUBCUTANEOUS at 05:04

## 2020-04-08 RX ADMIN — SODIUM BICARBONATE: 84 INJECTION, SOLUTION INTRAVENOUS at 10:04

## 2020-04-08 RX ADMIN — HEPARIN SODIUM 5000 UNITS: 5000 INJECTION INTRAVENOUS; SUBCUTANEOUS at 04:04

## 2020-04-08 RX ADMIN — SODIUM CHLORIDE, SODIUM LACTATE, POTASSIUM CHLORIDE, AND CALCIUM CHLORIDE: .6; .31; .03; .02 INJECTION, SOLUTION INTRAVENOUS at 04:04

## 2020-04-08 NOTE — PROGRESS NOTES
MANA drawn and reported to Dr. Truong. Hold bipap orders at this time. Increase patient to 4L per MD orders.  Will continue to monitor.

## 2020-04-08 NOTE — PROGRESS NOTES
Pharmacokinetic Initial Assessment: IV Vancomycin    Assessment/Plan:    Initiate intravenous vancomycin with loading dose of 1500  mg once with subsequent doses when random concentrations are less than 20 mcg/mL  Desired empiric serum trough concentration is 10 to 20 mcg/mL  Draw vancomycin random level on 4/9/2020 at 0400.  Pharmacy will continue to follow and monitor vancomycin.      Please contact pharmacy at extension [phone] with any questions regarding this assessment.     Thank you for the consult,   Jeremy Orta       Patient brief summary:  Keira Starkey is a 75 y.o. female initiated on antimicrobial therapy with IV Vancomycin for treatment of suspected bacteremia    Drug Allergies:   Review of patient's allergies indicates:  No Known Allergies    Actual Body Weight:   74.8 kg    Renal Function:   Estimated Creatinine Clearance: 20.6 mL/min (A) (based on SCr of 2.1 mg/dL (H)).,     Dialysis Method (if applicable):  N/A    CBC (last 72 hours):  Recent Labs   Lab Result Units 04/06/20  1545 04/07/20  0610   WBC K/uL 6.36 8.44   Hemoglobin g/dL 13.6 11.7*   Hematocrit % 44.9 38.0   Platelets K/uL 185 235   Gran% % 73.4* 74.0*   Lymph% % 14.6* 14.0*   Mono% % 7.2 6.0   Eosinophil% % 0.3 0.0   Basophil% % 0.6 0.0   Differential Method  Automated Manual       Metabolic Panel (last 72 hours):  Recent Labs   Lab Result Units 04/06/20  1747 04/06/20  1750 04/07/20  0610   Sodium mmol/L 142  --  142   Potassium mmol/L 4.3  --  4.7   Chloride mmol/L 115*  --  114*   CO2 mmol/L 17*  --  18*   Glucose mg/dL 173*  --  99   Glucose, UA   --  Negative  --    BUN, Bld mg/dL 48*  --  49*   Creatinine mg/dL 1.8*  --  2.1*   Albumin g/dL 2.1*  --  2.2*   Total Bilirubin mg/dL 0.1  --  0.1   Alkaline Phosphatase U/L 53*  --  66   AST U/L 27  --  32   ALT U/L 24  --  31   Magnesium mg/dL 2.5  --   --        Drug levels (last 3 results):  No results for input(s): VANCOMYCINRA, VANCOMYCINPE, VANCOMYCINTR in the last 72  hours.    Microbiologic Results:  Microbiology Results (last 7 days)       Procedure Component Value Units Date/Time    Blood culture x two cultures. Draw prior to antibiotics. [[id number]] Collected:  04/06/20 1545    Order Status:  Completed Specimen:  Blood from Peripheral, Forearm, Left Updated:  04/07/20 2303     Blood Culture, Routine No Growth to date      No Growth to date    Narrative:       Aerobic and anaerobic    Blood culture x two cultures. Draw prior to antibiotics. [[id number]] Collected:  04/06/20 1728    Order Status:  Completed Specimen:  Blood from Line, Femoral, Left Updated:  04/07/20 2232     Blood Culture, Routine Gram stain aer bottle: Gram positive cocci in clusters resembling Staph       Results called to and read back by: Luz Elena Flores    Narrative:       Aerobic and anaerobic

## 2020-04-08 NOTE — PLAN OF CARE
Problem: Skin Injury Risk Increased  Goal: Skin Health and Integrity  Outcome: Ongoing, Progressing  Intervention: Optimize Skin Protection  Flowsheets (Taken 4/8/2020 2933)  Pressure Reduction Techniques: frequent weight shift encouraged; weight shift assistance provided  Pressure Reduction Devices: pressure-redistributing mattress utilized; heel offloading device utilized; positioning supports utilized  Skin Protection: adhesive use limited  Head of Bed (HOB): HOB elevated

## 2020-04-08 NOTE — NURSING
19:12 Received report from TAMEKA Hernandez. Patient non verbal, responsive to pain. Open eyes when turned. IV line intact - on N. Saline at 50mls/hr. Cardiac monitor in place. On oxygen at 2L. Warmer blanket on. No sign of pain. No sign of distress. Bed on lowest position. Bed alarm on. Safety maintained.       22:42 Received Critical LAB result from LAB (Jie). Positive blood culture - Dr. Kingsley Marte informed. IV Vancomycin ordered.       Dressing changed over left heels. Wound care consult done. Heels elevated with pillow. Pressure Boots started on left foot

## 2020-04-09 PROBLEM — J80 ACUTE RESPIRATORY DISTRESS SYNDROME (ARDS) DUE TO COVID-19 VIRUS: Status: ACTIVE | Noted: 2020-04-07

## 2020-04-09 PROBLEM — Z51.5 PALLIATIVE CARE ENCOUNTER: Status: ACTIVE | Noted: 2020-04-09

## 2020-04-09 LAB
ALBUMIN SERPL BCP-MCNC: 2.1 G/DL (ref 3.5–5.2)
ALP SERPL-CCNC: 82 U/L (ref 55–135)
ALT SERPL W/O P-5'-P-CCNC: 31 U/L (ref 10–44)
ANION GAP SERPL CALC-SCNC: 8 MMOL/L (ref 8–16)
AST SERPL-CCNC: 42 U/L (ref 10–40)
BACTERIA BLD CULT: ABNORMAL
BASOPHILS NFR BLD: 0 % (ref 0–1.9)
BILIRUB SERPL-MCNC: 0.3 MG/DL (ref 0.1–1)
BUN SERPL-MCNC: 43 MG/DL (ref 8–23)
CALCIUM SERPL-MCNC: 9 MG/DL (ref 8.7–10.5)
CHLORIDE SERPL-SCNC: 118 MMOL/L (ref 95–110)
CO2 SERPL-SCNC: 22 MMOL/L (ref 23–29)
CREAT SERPL-MCNC: 3.1 MG/DL (ref 0.5–1.4)
D DIMER PPP IA.FEU-MCNC: 1.11 MG/L FEU
DIFFERENTIAL METHOD: ABNORMAL
EOSINOPHIL NFR BLD: 0 % (ref 0–8)
ERYTHROCYTE [DISTWIDTH] IN BLOOD BY AUTOMATED COUNT: 15.4 % (ref 11.5–14.5)
EST. GFR  (AFRICAN AMERICAN): 16 ML/MIN/1.73 M^2
EST. GFR  (NON AFRICAN AMERICAN): 14 ML/MIN/1.73 M^2
GIANT PLATELETS BLD QL SMEAR: PRESENT
GLUCOSE SERPL-MCNC: 87 MG/DL (ref 70–110)
HCT VFR BLD AUTO: 41.2 % (ref 37–48.5)
HGB BLD-MCNC: 12.3 G/DL (ref 12–16)
IMM GRANULOCYTES # BLD AUTO: ABNORMAL K/UL (ref 0–0.04)
IMM GRANULOCYTES NFR BLD AUTO: ABNORMAL % (ref 0–0.5)
LYMPHOCYTES NFR BLD: 22 % (ref 18–48)
MCH RBC QN AUTO: 25.3 PG (ref 27–31)
MCHC RBC AUTO-ENTMCNC: 29.9 G/DL (ref 32–36)
MCV RBC AUTO: 85 FL (ref 82–98)
METAMYELOCYTES NFR BLD MANUAL: 2 %
MONOCYTES NFR BLD: 13 % (ref 4–15)
MYELOCYTES NFR BLD MANUAL: 3 %
NEUTROPHILS NFR BLD: 56 % (ref 38–73)
NEUTS BAND NFR BLD MANUAL: 3 %
NRBC BLD-RTO: 1 /100 WBC
PHOSPHATE SERPL-MCNC: 3.3 MG/DL (ref 2.7–4.5)
PLATELET # BLD AUTO: 204 K/UL (ref 150–350)
PLATELET BLD QL SMEAR: ABNORMAL
PMV BLD AUTO: 8.9 FL (ref 9.2–12.9)
POCT GLUCOSE: 112 MG/DL (ref 70–110)
POCT GLUCOSE: 129 MG/DL (ref 70–110)
POCT GLUCOSE: 70 MG/DL (ref 70–110)
POTASSIUM SERPL-SCNC: 4.3 MMOL/L (ref 3.5–5.1)
PROMYELOCYTES NFR BLD MANUAL: 1 %
PROT SERPL-MCNC: 6.8 G/DL (ref 6–8.4)
RBC # BLD AUTO: 4.87 M/UL (ref 4–5.4)
SODIUM SERPL-SCNC: 148 MMOL/L (ref 136–145)
TOXIC GRANULES BLD QL SMEAR: PRESENT
VANCOMYCIN SERPL-MCNC: 17.3 UG/ML
WBC # BLD AUTO: 11.79 K/UL (ref 3.9–12.7)

## 2020-04-09 PROCEDURE — 27000221 HC OXYGEN, UP TO 24 HOURS

## 2020-04-09 PROCEDURE — 99232 SBSQ HOSP IP/OBS MODERATE 35: CPT | Mod: ,,, | Performed by: PSYCHIATRY & NEUROLOGY

## 2020-04-09 PROCEDURE — 63600175 PHARM REV CODE 636 W HCPCS: Performed by: HOSPITALIST

## 2020-04-09 PROCEDURE — 80202 ASSAY OF VANCOMYCIN: CPT

## 2020-04-09 PROCEDURE — 84100 ASSAY OF PHOSPHORUS: CPT

## 2020-04-09 PROCEDURE — 25000003 PHARM REV CODE 250: Performed by: INTERNAL MEDICINE

## 2020-04-09 PROCEDURE — 80053 COMPREHEN METABOLIC PANEL: CPT

## 2020-04-09 PROCEDURE — 85379 FIBRIN DEGRADATION QUANT: CPT

## 2020-04-09 PROCEDURE — 99223 PR INITIAL HOSPITAL CARE,LEVL III: ICD-10-PCS | Mod: ,,, | Performed by: INTERNAL MEDICINE

## 2020-04-09 PROCEDURE — 99232 PR SUBSEQUENT HOSPITAL CARE,LEVL II: ICD-10-PCS | Mod: ,,, | Performed by: PSYCHIATRY & NEUROLOGY

## 2020-04-09 PROCEDURE — 85007 BL SMEAR W/DIFF WBC COUNT: CPT

## 2020-04-09 PROCEDURE — 87040 BLOOD CULTURE FOR BACTERIA: CPT | Mod: 59

## 2020-04-09 PROCEDURE — 85027 COMPLETE CBC AUTOMATED: CPT

## 2020-04-09 PROCEDURE — 94761 N-INVAS EAR/PLS OXIMETRY MLT: CPT

## 2020-04-09 PROCEDURE — 63600175 PHARM REV CODE 636 W HCPCS: Performed by: FAMILY MEDICINE

## 2020-04-09 PROCEDURE — 99223 1ST HOSP IP/OBS HIGH 75: CPT | Mod: ,,, | Performed by: INTERNAL MEDICINE

## 2020-04-09 PROCEDURE — 63600175 PHARM REV CODE 636 W HCPCS: Performed by: INTERNAL MEDICINE

## 2020-04-09 PROCEDURE — 36415 COLL VENOUS BLD VENIPUNCTURE: CPT

## 2020-04-09 PROCEDURE — 21400001 HC TELEMETRY ROOM

## 2020-04-09 PROCEDURE — 63600175 PHARM REV CODE 636 W HCPCS: Performed by: EMERGENCY MEDICINE

## 2020-04-09 PROCEDURE — C9113 INJ PANTOPRAZOLE SODIUM, VIA: HCPCS | Performed by: EMERGENCY MEDICINE

## 2020-04-09 RX ADMIN — HEPARIN SODIUM 5000 UNITS: 5000 INJECTION INTRAVENOUS; SUBCUTANEOUS at 06:04

## 2020-04-09 RX ADMIN — CEFTRIAXONE 1 G: 1 INJECTION, POWDER, FOR SOLUTION INTRAMUSCULAR; INTRAVENOUS at 09:04

## 2020-04-09 RX ADMIN — SODIUM BICARBONATE: 84 INJECTION, SOLUTION INTRAVENOUS at 01:04

## 2020-04-09 RX ADMIN — HEPARIN SODIUM 5000 UNITS: 5000 INJECTION INTRAVENOUS; SUBCUTANEOUS at 01:04

## 2020-04-09 RX ADMIN — VANCOMYCIN HYDROCHLORIDE 500 MG: 500 INJECTION, POWDER, LYOPHILIZED, FOR SOLUTION INTRAVENOUS at 09:04

## 2020-04-09 RX ADMIN — HEPARIN SODIUM 5000 UNITS: 5000 INJECTION INTRAVENOUS; SUBCUTANEOUS at 09:04

## 2020-04-09 RX ADMIN — HEPARIN SODIUM 5000 UNITS: 5000 INJECTION INTRAVENOUS; SUBCUTANEOUS at 12:04

## 2020-04-09 RX ADMIN — PANTOPRAZOLE SODIUM 40 MG: 40 INJECTION, POWDER, FOR SOLUTION INTRAVENOUS at 08:04

## 2020-04-09 NOTE — ASSESSMENT & PLAN NOTE
Chronic, with left sided contractures.  Prior stroke?  Although I do not see clear evidence of this on CT head.

## 2020-04-09 NOTE — PROGRESS NOTES
"[facility] Hospital Medicine  Progress Note    Patient Name: Keira Starkey  MRN: [id number]  Patient Class: IP- Inpatient   Admission Date: 4/6/2020  Length of Stay: 2 days  Attending Physician: Uday Pierre MD      Subjective:     Principal Problem:Acute respiratory distress syndrome (ARDS) due to COVID-19 virus      HPI:  Per Dr. Kareem aMrte:    "Pt is a 74 yo M resident of a nursing home with a h/o CKD, Dementia, HTN, hemiplegia, GERD, OA who presents to the ER from nursing home for AMS and hypoxia.  Pt has been having  A decreased mental state x 2 days along with poor appetite. When EMS arrived they could not get a blood pressure per the ED physician and HR was noted to be as low is 20-30's beats per minute.  In the ED pt was also noted to be hypothermic and manual BP was ~100 systolic.  Pt was placd in ED and given atropine.  After rewarming pt's HR did come up to the 60's.  Pt was tested for COVID and was positive.  Pt was started on oxygen support and antibiotics."    Overview/Hospital Course:  Patient is a 75 year-old woman nursing home resident with hypertension, chronic kidney disease stage III, prior history of left heel osteomyelitis, osteoarthritis, dementia was sent to the emergency department for evaluation for new onset hypoxia and worsening mental status.  Patient tested positive for COVID-19 infection. Patient admitted to the hospital for treatment of infection with acute metabolic encephalopathy and renal failure.  Patient started on empiric antibiotic therapy and treated with supplemental oxygen. Patient has clinically declined despite medical therapy and supportive care with worsening renal failure. Nephrology service consulted and feels patient is not a good candidate for hemodialysis.   Palliative care service also consulted and had discussions with patient's family who agree to focus on comfort care and DNR ordered placed.    Interval History:  Patient more awake and " alert this morning.     Review of Systems   Unable to perform ROS: Mental status change     Objective:     Vital Signs (Most Recent):  Temp: 97.1 °F (36.2 °C) (04/09/20 1200)  Pulse: 78 (04/09/20 0730)  Resp: (!) 22 (04/09/20 1200)  BP: (!) 106/56 (04/09/20 1200)  SpO2: 97 % (04/09/20 1200) Vital Signs (24h Range):  Temp:  [96.3 °F (35.7 °C)-98.3 °F (36.8 °C)] 97.1 °F (36.2 °C)  Pulse:  [] 78  Resp:  [16-40] 22  SpO2:  [90 %-100 %] 97 %  BP: (102-168)/(56-72) 106/56     Weight: 74.8 kg (164 lb 14.5 oz)  Body mass index is 32.21 kg/m².    Intake/Output Summary (Last 24 hours) at 4/9/2020 1818  Last data filed at 4/9/2020 0600  Gross per 24 hour   Intake 887.5 ml   Output 550 ml   Net 337.5 ml      Physical Exam   Constitutional: No distress.   Ill-appearing.   HENT:   Head: Normocephalic and atraumatic.   Dry mucous membranes   Eyes: Pupils are equal, round, and reactive to light. EOM are normal.   Neck: No tracheal deviation present.   Contracture   Cardiovascular: Regular rhythm. Tachycardia present.   Pulmonary/Chest: She has rales.   Abdominal: Soft. Bowel sounds are normal.   Musculoskeletal: Normal range of motion. She exhibits no deformity.   Neurological: She displays abnormal reflex. She exhibits abnormal muscle tone.   Altered, confused, but more awake today and able to speak a few words.   Skin: Skin is warm. She is not diaphoretic. No pallor.   Vitals reviewed.      Significant Labs: All pertinent labs within the past 24 hours have been reviewed.    Significant Imaging: I have reviewed all pertinent imaging results/findings within the past 24 hours.      Assessment/Plan:      * Acute respiratory distress syndrome (ARDS) due to COVID-19 virus  Patient tested positive for COVID-19.  Despite supportive care and supplemental oxygen to maintain reasonable oxygen saturation  Along with empiric antibiotic therapy to treat possible bacterial infection, patient has had progressive decline with increasing  oxygen requirements and worsening renal failure.    Patient placed on non-rebreather mask which we have been able to wean to a Venti mask this afternoon.    Palliative care consulted and recommending transition to comfort focus care.  Family in agreement. Will continue with current level of care to see if she improves but otherwise do not resuscitate or intubate event of worsening respiratory failure or cardiac arrest.    Acute metabolic encephalopathy  Likely due to infection and also renal failure. Mental status mildly improved today although overall her trajectory has been a negative.  Continue with antibiotic therapy.  Repeat blood cultures today.    Acute renal failure superimposed on stage 3 chronic kidney disease  Mildly improved mental status however serum creatinine continues trend upward.  Discussed with nephrology feels patient is not a reasonable candidate for hemodialysis.  Will continue with gentle  intravenous fluids as she still appears clinically hypovolemic although we are trying to avoid hypervolemia and pulmonary edema due to COVID-19 infection.    Essential hypertension  Normotensive.  Holding blood pressure medications.    Dementia with behavioral disturbance  History of refusing medications.  Mental status worse likely due to acute metabolic encephalopathy from infection and worsening renal failure.    Hemiplegia  Chronic, with left sided contractures.  Prior stroke?  Although I do not see clear evidence of this on CT head.      VTE Risk Mitigation (From admission, onward)         Ordered     heparin (porcine) injection 5,000 Units  Every 8 hours      04/07/20 0532     IP VTE HIGH RISK PATIENT  Once      04/07/20 0530     Place sequential compression device  Until discontinued      04/07/20 0530              Uday Pierre MD  Department of Hospital Medicine   [facility]

## 2020-04-09 NOTE — ASSESSMENT & PLAN NOTE
Patient tested positive for COVID-19.  Continue supportive care and supplemental oxygen to maintain reasonable oxygen saturation (currently on 4 liters/minute of supplemental oxygen by nasal cannula).  Covering for bacterial infection in light of severity of her condition.  Poor prognosis.  Consulted palliative care.   Detail Level: Detailed

## 2020-04-09 NOTE — SUBJECTIVE & OBJECTIVE
Past Medical History:   Diagnosis Date    Anemia     Chronic kidney disease     Dementia     Dysphagia     GERD (gastroesophageal reflux disease)     Hemiplegia     Hypertension     Osteoarthritis     Osteomyelitis        Past Surgical History:   Procedure Laterality Date    DEBRIDEMENT OF FOOT Left 4/12/2019    Procedure: DEBRIDEMENT, FOOT, BONE BIOPSY, WOUND VAC PLACEMENT;  Surgeon: Warner Kramer DPM;  Location: [facility];  Service: Podiatry;  Laterality: Left;       Review of patient's allergies indicates:  No Known Allergies    Medications:  Continuous Infusions:   sodium bicarbonate drip 75 mL/hr at 04/09/20 1354     Scheduled Meds:   cefTRIAXone (ROCEPHIN) IVPB  1 g Intravenous Q24H    heparin (porcine)  5,000 Units Subcutaneous Q8H    pantoprazole  40 mg Intravenous Daily     PRN Meds:acetaminophen, hydrALAZINE, ondansetron, sodium chloride 0.9%, Pharmacy to dose Vancomycin consult **AND** vancomycin - pharmacy to dose    Family History     None        Tobacco Use    Smoking status: Unknown If Ever Smoked   Substance and Sexual Activity    Alcohol use: Not Currently    Drug use: Never    Sexual activity: Not on file       Review of Systems   Unable to perform ROS: Mental status change     Objective:     Vital Signs (Most Recent):  Temp: 96.3 °F (35.7 °C) (04/09/20 0730)  Pulse: 78 (04/09/20 0730)  Resp: (!) 24 (04/09/20 0730)  BP: (!) 168/72 (04/09/20 0730)  SpO2: 95 % (04/09/20 0935) Vital Signs (24h Range):  Temp:  [96.3 °F (35.7 °C)-98.4 °F (36.9 °C)] 96.3 °F (35.7 °C)  Pulse:  [] 78  Resp:  [16-40] 24  SpO2:  [90 %-100 %] 95 %  BP: (102-168)/(57-72) 168/72     Weight: 74.8 kg (164 lb 14.5 oz)  Body mass index is 32.21 kg/m².    Review of Symptoms  Symptom Assessment (ESAS 0-10 scale)   ESAS 0 1 2 3 4 5 6 7 8 9 10   Pain              Dyspnea              Anxiety              Nausea              Depression               Anorexia              Fatigue              Insomnia               Restlessness               Agitation                      Physical Exam   Constitutional:   Non responsive   Cardiovascular:   Cardiac monitor   Pulmonary/Chest:   RR 40, on non rebreather   Neurological:   Non responsive   Nursing note and vitals reviewed.      Significant Labs:   All pertinent labs within the past 24 hours have been reviewed.  Recent Lab Results       04/09/20  0615   04/09/20  0603   04/09/20  0602   04/08/20  1618        Albumin   2.1         Alkaline Phosphatase   82         Allens Test       Pass     ALT   31         Anion Gap   8         AST   42         BANDS   3.0         Basophil%   0.0         BILIRUBIN TOTAL   0.3  Comment:  For infants and newborns, interpretation of results should be based  on gestational age, weight and in agreement with clinical  observations.  Premature Infant recommended reference ranges:  Up to 24 hours.............<8.0 mg/dL  Up to 48 hours............<12.0 mg/dL  3-5 days..................<15.0 mg/dL  6-29 days.................<15.0 mg/dL           Site       LR     BUN, Bld   43         Calcium   9.0         Chloride   118         CO2   22         Creatinine   3.1         D-Dimer   1.11  Comment:  The quantitative D-dimer assay should be used as an aid in   the diagnosis of deep vein thrombosis and pulmonary embolism  in patients with the appropriate presentation and clinical  history. The upper limit of the reference interval and the clinical   cut off   point are identical. Causes of a positive (>0.50 mg/L FEU) D-Dimer   test  include, but are not limited to: DVT, PE, DIC, thrombolytic   therapy, anticoagulant therapy, recent surgery, trauma, or   pregnancy, disseminated malignancy, aortic aneurysm, cirrhosis,  and severe infection. False negative results may occur in   patients with distal DVT.           DelSys       Nasal Can     Differential Method   Manual         eGFR if    16         eGFR if non     14  Comment:  Calculation used to obtain the estimated glomerular filtration  rate (eGFR) is the CKD-EPI equation.            Eosinophil%   0.0         Flow       2     Large/Giant Platelets   Present         Glucose   87         Gran%   56.0         Hematocrit   41.2         Hemoglobin   12.3         Immature Grans (Abs)   CANCELED  Comment:  Mild elevation in immature granulocytes is non specific and   can be seen in a variety of conditions including stress response,   acute inflammation, trauma and pregnancy. Correlation with other   laboratory and clinical findings is essential.    Result canceled by the ancillary.           Immature Granulocytes   CANCELED  Comment:  Result canceled by the ancillary.         Lymph%   22.0         MCH   25.3         MCHC   29.9         MCV   85         Metamyelocytes   2.0         Mode       SPONT     Mono%   13.0         MPV   8.9         Myelocytes   3.0         nRBC   1         Phosphorus     3.3       Platelet Estimate   Appears normal         Platelets   204         POC BE       -6     POC HCO3       15.8     POC PCO2       21.7     POC PH       7.471     POC PO2       60     POC SATURATED O2       93     POC TCO2       16     POCT Glucose 70           Potassium   4.3         Promyelocytes   1.0         PROTEIN TOTAL   6.8         RBC   4.87         RDW   15.4         Sample       ARTERIAL     Sodium   148         Sp02       90     Toxic Granulation   Present         Vancomycin, Random   17.3         WBC   11.79             CBC:   Recent Labs   Lab 04/09/20  0603   WBC 11.79   HGB 12.3   HCT 41.2   MCV 85        BMP:  Recent Labs   Lab 04/09/20  0603   GLU 87   *   K 4.3   *   CO2 22*   BUN 43*   CREATININE 3.1*   CALCIUM 9.0     LFT:  Lab Results   Component Value Date    AST 42 (H) 04/09/2020    ALKPHOS 82 04/09/2020    BILITOT 0.3 04/09/2020     Albumin:   Albumin   Date Value Ref Range Status   04/09/2020 2.1 (L) 3.5 - 5.2 g/dL Final     Protein:    Total Protein   Date Value Ref Range Status   04/09/2020 6.8 6.0 - 8.4 g/dL Final     Lactic acid:   Lab Results   Component Value Date    LACTATE 0.8 04/06/2020    LACTATE 1.0 04/06/2020       Significant Imaging: I have reviewed all pertinent imaging results/findings within the past 24 hours.    Advance Care Planning     Pt was not communicative when I went to see her, nor was she at any point from the moment of her admission.  I had a discussion with her daughter, Yamileth Saldaña, who confirmed she was the POA. She wanted to consult with her aunt, Yamileth, who talked to me on the phone too (speaker phone). The aunt asked a lot of questions after I explained about the pt's global situation, summarizing former situation (patient in a NH for 5 years, total care), present issues, as well as input from Hospitalist, ID, Nephrologist, and Neurologist.  They asked to have some time to have a family discussion before taking a final decision re: DNR.  After two hours, I called Yamileth Saldaña again. She confirmed once more that she was the POA. She reported that they had the family discussion, and that they were all in agreement for the patient to be DNR, with comfort focused treatment.  They expressed the wish to be able at some point to see Ms. Starkey through [facility].    Dr. Mcgarry informed of above, code changed in the pt's record.

## 2020-04-09 NOTE — SUBJECTIVE & OBJECTIVE
Interval History:  Patient less alert and more confused.    Review of Systems   Unable to perform ROS: Mental status change     Objective:     Vital Signs (Most Recent):  Temp: 98.1 °F (36.7 °C) (04/08/20 2007)  Pulse: 100 (04/08/20 2007)  Resp: (!) 22 (04/08/20 2007)  BP: (!) 144/66 (04/08/20 2007)  SpO2: 98 % (04/08/20 2007) Vital Signs (24h Range):  Temp:  [98.1 °F (36.7 °C)-99.5 °F (37.5 °C)] 98.1 °F (36.7 °C)  Pulse:  [] 100  Resp:  [18-22] 22  SpO2:  [90 %-100 %] 98 %  BP: (124-144)/(60-68) 144/66     Weight: 74.8 kg (164 lb 14.5 oz)  Body mass index is 32.21 kg/m².    Intake/Output Summary (Last 24 hours) at 4/8/2020 2027  Last data filed at 4/8/2020 1800  Gross per 24 hour   Intake 188.33 ml   Output 900 ml   Net -711.67 ml      Physical Exam   Constitutional: No distress.   Ill-appearing.   HENT:   Head: Normocephalic and atraumatic.   Dry mucous membranes   Eyes: Pupils are equal, round, and reactive to light. EOM are normal.   Neck: No tracheal deviation present.   Contracture   Cardiovascular: Regular rhythm. Tachycardia present.   Pulmonary/Chest: She has rales.   Abdominal: Soft. Bowel sounds are normal.   Musculoskeletal: Normal range of motion. She exhibits no deformity.   Neurological: She displays abnormal reflex. She exhibits abnormal muscle tone.   Skin: Skin is warm. She is not diaphoretic. No pallor.   Psychiatric:   Altered, confused   Vitals reviewed.      Significant Labs: All pertinent labs within the past 24 hours have been reviewed.    Significant Imaging: I have reviewed all pertinent imaging results/findings within the past 24 hours.

## 2020-04-09 NOTE — SUBJECTIVE & OBJECTIVE
Review of Systems   Unable to perform ROS: Mental status change     Objective:     Vital Signs (Most Recent):  Temp: 96.3 °F (35.7 °C) (04/09/20 0730)  Pulse: 78 (04/09/20 0730)  Resp: (!) 24 (04/09/20 0730)  BP: (!) 168/72 (04/09/20 0730)  SpO2: 95 % (04/09/20 0935) Vital Signs (24h Range):  Temp:  [96.3 °F (35.7 °C)-98.4 °F (36.9 °C)] 96.3 °F (35.7 °C)  Pulse:  [] 78  Resp:  [16-40] 24  SpO2:  [90 %-100 %] 95 %  BP: (102-168)/(57-72) 168/72     Weight: 74.8 kg (164 lb 14.5 oz)  Body mass index is 32.21 kg/m².    Intake/Output Summary (Last 24 hours) at 4/9/2020 1409  Last data filed at 4/9/2020 0600  Gross per 24 hour   Intake 1075.83 ml   Output 800 ml   Net 275.83 ml      Physical Exam   Constitutional: No distress.   Ill-appearing.   HENT:   Head: Normocephalic and atraumatic.   Dry mucous membranes   Eyes: Pupils are equal, round, and reactive to light. EOM are normal.   Neck: No tracheal deviation present.   Contracture   Cardiovascular: Regular rhythm. Tachycardia present.   Pulmonary/Chest: She is in respiratory distress. She has rales.   Tachypnic, shallow breathing. On NRB   Abdominal: Soft. Bowel sounds are normal.   Musculoskeletal: Normal range of motion. She exhibits no deformity.   Skin: Skin is warm. She is not diaphoretic. No pallor.   Psychiatric:   somnolent   Vitals reviewed.      Significant Labs: All pertinent labs within the past 24 hours have been reviewed.    Significant Imaging: I have reviewed all pertinent imaging results/findings within the past 24 hours.

## 2020-04-09 NOTE — PROGRESS NOTES
Date of Admission:4/6/2020    SUBJECTIVE: notes about same per rn  Jerks better per rn    Current Facility-Administered Medications   Medication    acetaminophen tablet 650 mg    cefTRIAXone (ROCEPHIN) 1 g in dextrose 5 % 50 mL IVPB    heparin (porcine) injection 5,000 Units    hydrALAZINE injection 10 mg    ondansetron injection 4 mg    pantoprazole injection 40 mg    sodium bicarbonate 1 mEq/mL (8.4 %) 75 mEq in dextrose 5 % 1,000 mL infusion    sodium chloride 0.9% flush 10 mL    vancomycin - pharmacy to dose       Wt Readings from Last 3 Encounters:   04/08/20 74.8 kg (164 lb 14.5 oz)   07/02/19 74.8 kg (165 lb)   04/10/19 75.1 kg (165 lb 9.6 oz)     Temp Readings from Last 3 Encounters:   04/09/20 97.1 °F (36.2 °C) (Axillary)   06/20/19 97.8 °F (36.6 °C) (Oral)   04/16/19 98 °F (36.7 °C) (Oral)     BP Readings from Last 3 Encounters:   04/09/20 (!) 106/56   06/20/19 131/81   04/16/19 124/63     Pulse Readings from Last 3 Encounters:   04/09/20 78   06/20/19 76   04/16/19 (!) 54       Intake/Output Summary (Last 24 hours) at 4/9/2020 1639  Last data filed at 4/9/2020 0600  Gross per 24 hour   Intake 1075.83 ml   Output 800 ml   Net 275.83 ml       PE:  No jerks or edema per rn      Recent Labs   Lab 04/09/20  0603   GLU 87   *   K 4.3   *   CO2 22*   BUN 43*   CREATININE 3.1*   CALCIUM 9.0       Lab Results   Component Value Date    CALCIUM 9.0 04/09/2020    PHOS 3.3 04/09/2020       Recent Labs   Lab 04/09/20  0603   WBC 11.79   RBC 4.87   HGB 12.3   HCT 41.2      MCV 85   MCH 25.3*   MCHC 29.9*         A/P:  rebeca on ckd 3. Creatinine is worse. Cont hydration.   covid 19 infection with pna. Cont tx per ID.  Hypernatremia. Cont hydration.  Metabolic acidosis.better with tx.  Dementia. About same per rn.  Anemia. Better. Concentrated.  Proteinuria. No arb for now.  Hypoglycemia. Sugar low normal. Cont d5w.  Bacteremia. tx per id. Following cx.    Due to the pt's covid 19 infection  state, a bedside exam was not performed. Pertinent exam findings were discussed with the pt's nurse who examined the pt with appropriate ppe. Objective data and notes were reviewed by me.

## 2020-04-09 NOTE — ASSESSMENT & PLAN NOTE
History of refusing medications.  Mental status worse likely due to acute metabolic encephalopathy from infection and worsening renal failure.

## 2020-04-09 NOTE — ASSESSMENT & PLAN NOTE
Patient tested positive for COVID-19.  Despite supportive care and supplemental oxygen to maintain reasonable oxygen saturation  Along with empiric antibiotic therapy to treat possible bacterial infection, patient has had progressive decline with increasing oxygen requirements and worsening renal failure.    Patient placed on non-rebreather mask which we have been able to wean to a Venti mask this afternoon.    Palliative care consulted and recommending transition to comfort focus care.  Family in agreement. Will continue with current level of care to see if she improves but otherwise do not resuscitate or intubate event of worsening respiratory failure or cardiac arrest.

## 2020-04-09 NOTE — ASSESSMENT & PLAN NOTE
75M nursing home resident with h/o CKD, Dementia, HTN, hemiplegia, admitted with AMS and hypoxia. Found to have acute on chronic renal failure and hypoxic respiratory distress and bradycardia and hypothermia. covid testing positive. ID consulted for positive blood cultures (1 of 2 BCx from admit 4/6 positive for CoNS). Increasing hypoxia and tachypnea. Has been on vancomycin, ceftriaxone, and azithromycin. Suspect the CoNS blood culture represents contaminant. Suspect worsening dementia 2/2 covid    Recommendations:  - repeat blood cultures. If remain neg x48h, would stop vancomycin  - continue ceftriaxone for possible bacterial pneumonia, but suspect viral/covid etiology > bacterial. Stop azithromycin given qtc prolongation. Could consider doxycycline for atypical coverage given critical illness.   - Continue airborne and contact precautions  -  Hydroxychloroquine will likely help with cytokine storm process and there is in vitro data and clinical experience. However, am extremely hesitant to give this given qtc prolongation of 622 and unproven benefit  - supportive care  - goals of care discussion/code status  - family members/close contacts of positive case should self quarantine x 2 weeks and at least 72 hours after symptoms resolve. They should call [facility] covid-19 hotline if symptoms for testing [phone], but if acute respiratory symptoms, should go to ED.

## 2020-04-09 NOTE — PLAN OF CARE
Total care pt from [facility] senior care. Covid positive        04/08/20 1620   Discharge Assessment   Assessment Type Discharge Planning Assessment   Assessment information obtained from? Medical Record   Prior to hospitilization cognitive status: Not Oriented to Place;Not Oriented to Time   Prior to hospitalization functional status: Completely Dependent   Current cognitive status: Unable to Assess   Current Functional Status: Completely Dependent   Lives With facility resident   Able to Return to Prior Arrangements other (see comments)  (covid positive)   Is patient able to care for self after discharge? No   Who are your caregiver(s) and their phone number(s)?    (dtr. Nigel Thomas [phone])   Patient's perception of discharge disposition admitted as an inpatient   Readmission Within the Last 30 Days no previous admission in last 30 days   Patient currently being followed by outpatient case management? No   Patient currently receives any other outside agency services? No   Equipment Currently Used at Home none   Do you have any problems affording any of your prescribed medications? No   Is the patient taking medications as prescribed? yes   Does the patient have transportation home? Yes   Transportation Anticipated agency   Does the patient receive services at the [facility]? No   Discharge Plan A Return to nursing home   Discharge Plan B Return to Nursing Home   DME Needed Upon Discharge  none   Patient/Family in Agreement with Plan unable to assess

## 2020-04-09 NOTE — ASSESSMENT & PLAN NOTE
Mildly improved mental status however serum creatinine continues trend upward.  Discussed with nephrology feels patient is not a reasonable candidate for hemodialysis.  Will continue with gentle  intravenous fluids as she still appears clinically hypovolemic although we are trying to avoid hypervolemia and pulmonary edema due to COVID-19 infection.

## 2020-04-09 NOTE — PROGRESS NOTES
[facility]  Neurology  Progress Note    Patient Name: Keira Starkey  MRN: [id number]  Admission Date: 4/6/2020  Hospital Length of Stay: 2 days  Code Status: DNR   Attending Provider: Uday Pierre MD  Primary Care Physician: Primary Doctor No   Principal Problem:COVID-19    Subjective:     Interval History: 74 y/o female with medical Hx as listed below was brought to ED for shortness of breath and AMS. In ED she was found to be markedly bradycardic and hypothermic. COVID-19 positive. Pt was rewarmed and after atropine her heart rate has been in the 60's-100's. Pt has been exhibiting involuntary movements of extremities and has remained poorly responsive.     -4/10/20: Pt remains poorly responsive.    Current Neurological Medications:     Current Facility-Administered Medications   Medication Dose Route Frequency Provider Last Rate Last Dose    acetaminophen tablet 650 mg  650 mg Oral Q6H PRN Kareem Marte MD        cefTRIAXone (ROCEPHIN) 1 g in dextrose 5 % 50 mL IVPB  1 g Intravenous Q24H Cipriano Quijano MD   1 g at 04/08/20 1955    heparin (porcine) injection 5,000 Units  5,000 Units Subcutaneous Q8H Kareem Marte MD   5,000 Units at 04/09/20 1353    hydrALAZINE injection 10 mg  10 mg Intravenous Q8H PRN Kareem Marte MD        ondansetron injection 4 mg  4 mg Intravenous Q8H PRN Cipriano Quijano MD        pantoprazole injection 40 mg  40 mg Intravenous Daily Cipriano Quijano MD   40 mg at 04/09/20 0833    sodium bicarbonate 1 mEq/mL (8.4 %) 75 mEq in dextrose 5 % 1,000 mL infusion   Intravenous Continuous Alanis Truong MD 75 mL/hr at 04/09/20 1354      sodium chloride 0.9% flush 10 mL  10 mL Intravenous PRN Cipriano Quijano MD        vancomycin - pharmacy to dose   Intravenous pharmacy to manage frequency Kareem Marte MD           Review of Systems   Unable to obtain due to AMS    Objective:     Vital Signs (Most Recent):  Temp: 97.1 °F (36.2 °C) (04/09/20 1200)  Pulse: 78 (04/09/20  0730)  Resp: (!) 22 (04/09/20 1200)  BP: (!) 106/56 (04/09/20 1200)  SpO2: 97 % (04/09/20 1200) Vital Signs (24h Range):  Temp:  [96.3 °F (35.7 °C)-98.3 °F (36.8 °C)] 97.1 °F (36.2 °C)  Pulse:  [] 78  Resp:  [16-40] 22  SpO2:  [90 %-100 %] 97 %  BP: (102-168)/(56-72) 106/56     Weight: 74.8 kg (164 lb 14.5 oz)  Body mass index is 32.21 kg/m².    Physical Exam  Constitutional: No distress.   HENT:   Head: Normocephalic.   Eyes: Right eye exhibits no discharge. Left eye exhibits no discharge.   Neck: Normal range of motion.   Cardiovascular: Normal rate.   Skin: She is not diaphoretic.         NEUROLOGICAL EXAMINATION:      MENTAL STATUS        Unresponsive to verbal stimuli      CRANIAL NERVES      CN III, IV, VI   Right pupil: Size: 2 mm. Shape: regular.   Left pupil: Size: 2 mm. Shape: regular.   Nystagmus: none   Conjugate gaze: present     CN VII   Right facial weakness: none  Left facial weakness: none     MOTOR EXAM   Right arm tone: spastic  Left arm tone: spastic       Clonus on four extremities upon passive ROM      SENSORY EXAM        No response to noxious stimulation        Significant Labs:   CBC:   Recent Labs   Lab 04/09/20  0603 04/10/20  0625   WBC 11.79 8.65   HGB 12.3 12.0   HCT 41.2 40.7    182     CMP:   Recent Labs   Lab 04/09/20  0603 04/10/20  0625   GLU 87 81   * 147*   K 4.3 4.2   * 113*   CO2 22* 23   BUN 43* 35*   CREATININE 3.1* 2.7*   CALCIUM 9.0 9.0   PROT 6.8 6.7   ALBUMIN 2.1* 2.0*   BILITOT 0.3 0.3   ALKPHOS 82 92   AST 42* 35   ALT 31 31   ANIONGAP 8 11   EGFRNONAA 14* 17*       Significant Imaging: I have reviewed all pertinent imaging results/findings within the past 24 hours.    Assessment and Plan:     74 y/o female consulted for possible seizures     1. Seizures? On exam her involuntary movements seem clonus/myoclonus. Pt seem to be profoundly encephalopathic (infectious, metabolic). Unable to R/O seizures at this moment.           Head CT shows no  acute abnormalities.           -Diazepam 5 mg IV x 1 may be of help for rigidity.           -Pt is DNR now. Per Palliative [facility] note family focused in comfort care.    Active Diagnoses:    Diagnosis Date Noted POA    PRINCIPAL PROBLEM:  COVID-19 [U07.1, J98.8] 04/07/2020 Yes    Palliative care encounter [Z51.5] 04/09/2020 Not Applicable    Acute metabolic encephalopathy [G93.41] 04/08/2020 Yes    Hemiplegia [G81.90] 04/07/2020 Unknown    Essential hypertension [I10] 04/11/2019 Yes     Chronic    Acute renal failure superimposed on stage 3 chronic kidney disease [N17.9, N18.3] 04/11/2019 Yes    Dementia with behavioral disturbance [F03.91] 04/11/2019 Yes     Chronic      Problems Resolved During this Admission:    Diagnosis Date Noted Date Resolved POA    Hypothermia [T68.XXXA] 04/07/2020 04/08/2020 Yes    Bradycardia [R00.1] 04/07/2020 04/08/2020 Unknown    GERD (gastroesophageal reflux disease) [K21.9] 04/11/2019 04/08/2020 Yes     Chronic       VTE Risk Mitigation (From admission, onward)         Ordered     heparin (porcine) injection 5,000 Units  Every 8 hours      04/07/20 0532     IP VTE HIGH RISK PATIENT  Once      04/07/20 0530     Place sequential compression device  Until discontinued      04/07/20 0530                Elias Lynn MD  Neurology  [facility]

## 2020-04-09 NOTE — CONSULTS
[facility]  Neurology  Consult Note    Patient Name: Keira Starkey  MRN: [id number]  Admission Date: 4/6/2020  Hospital Length of Stay: 1 days  Code Status: Full Code   Attending Provider: Uday Pierre MD   Consulting Provider: Elias Lynn MD  Primary Care Physician: Primary Doctor No  Principal Problem:COVID-19    Inpatient consult to Neurology  Consult performed by: Elias Lynn MD  Consult ordered by: Uday Pierre MD        Subjective:     Chief Complaint/Reason for consult: Seizures?     HPI: 76 y/o female with medical Hx as listed below was brought to ED for shortness of breath and AMS. In ED she was found to be markedly bradycardic and hypothermic. COVID-19 positive. Pt was rewarmed and after atropine her heart rate has been in the 60's-100's. Pt has been exhibiting involuntary movements of extremities and has remained poorly responsive.    Past Medical History:   Diagnosis Date    Anemia     Chronic kidney disease     Dementia     Dysphagia     GERD (gastroesophageal reflux disease)     Hemiplegia     Hypertension     Osteoarthritis     Osteomyelitis        Past Surgical History:   Procedure Laterality Date    DEBRIDEMENT OF FOOT Left 4/12/2019    Procedure: DEBRIDEMENT, FOOT, BONE BIOPSY, WOUND VAC PLACEMENT;  Surgeon: Warner Kramer DPM;  Location: [facility] OR;  Service: Podiatry;  Laterality: Left;       Review of patient's allergies indicates:  No Known Allergies    Current Neurological Medications:     No current facility-administered medications on file prior to encounter.      Current Outpatient Medications on File Prior to Encounter   Medication Sig    ARIPiprazole (ABILIFY) 15 MG Tab Take 15 mg by mouth nightly.     ascorbic acid, vitamin C, (VITAMIN C) 100 MG tablet Take 500 mg by mouth once daily.     carvedilol (COREG) 6.25 MG tablet Take 6.25 mg by mouth 2 (two) times daily with meals.    donepezil (ARICEPT) 10 MG tablet Take 10 mg by mouth every  evening.    escitalopram oxalate (LEXAPRO) 10 MG tablet Take 15 mg by mouth once daily.    fluticasone (FLONASE) 50 mcg/actuation nasal spray 1 spray by Each Nare route once daily.    heparin sod,porcine/0.9 % NaCl (HEPARIN, PORCINE, IN 0.9% NACL) 10,000 unit/1,000 mL SolP Inject into the vein.    multivitamin capsule Take 1 capsule by mouth once daily.    protein supplement Liqd Take by mouth.    ranitidine (ZANTAC) 150 MG tablet Take 150 mg by mouth once daily.     traZODone (DESYREL) 50 MG tablet Take 25 mg by mouth every evening.    vitamin D (VITAMIN D3) 1000 units Tab Take 1,000 Units by mouth once daily.    zinc sulfate (ZINCATE) 220 (50) mg capsule Take 220 mg by mouth once daily.      Family History     None        Tobacco Use    Smoking status: Unknown If Ever Smoked   Substance and Sexual Activity    Alcohol use: Not Currently    Drug use: Never    Sexual activity: Not on file     Review of Systems   Unable to perform ROS: Mental status change     Objective:     Vital Signs (Most Recent):  Temp: 98.4 °F (36.9 °C) (04/08/20 1625)  Pulse: 105 (04/08/20 1625)  Resp: 19 (04/08/20 1625)  BP: 135/64 (04/08/20 1625)  SpO2: 99 % (04/08/20 1625) Vital Signs (24h Range):  Temp:  [97.2 °F (36.2 °C)-99.5 °F (37.5 °C)] 98.4 °F (36.9 °C)  Pulse:  [] 105  Resp:  [18-22] 19  SpO2:  [90 %-100 %] 99 %  BP: (124-144)/(60-68) 135/64     Weight: 74.8 kg (164 lb 14.5 oz)  Body mass index is 32.21 kg/m².    Physical Exam   Constitutional: No distress.   HENT:   Head: Normocephalic.   Eyes: Right eye exhibits no discharge. Left eye exhibits no discharge.   Neck: Normal range of motion.   Cardiovascular: Normal rate.   Skin: She is not diaphoretic.       NEUROLOGICAL EXAMINATION:     MENTAL STATUS        Unresponsive to verbal stimuli     CRANIAL NERVES     CN III, IV, VI   Right pupil: Size: 2 mm. Shape: regular.   Left pupil: Size: 2 mm. Shape: regular.   Nystagmus: none   Conjugate gaze: present    CN VII    Right facial weakness: none  Left facial weakness: none    MOTOR EXAM   Right arm tone: spastic  Left arm tone: spastic       Clonus on four extremities upon passive ROM     SENSORY EXAM        No response to noxious stimulation       Significant Labs:   CBC:   Recent Labs   Lab 04/07/20  0610 04/08/20  0504   WBC 8.44 9.60   HGB 11.7* 11.4*   HCT 38.0 36.4*    200     CMP:   Recent Labs   Lab 04/07/20  0610 04/08/20  0504   GLU 99 62*    146*   K 4.7 4.5   * 118*   CO2 18* 15*   BUN 49* 47*   CREATININE 2.1* 2.7*   CALCIUM 8.9 8.7   PROT 6.8 6.6   ALBUMIN 2.2* 2.1*   BILITOT 0.1 0.2   ALKPHOS 66 59   AST 32 27   ALT 31 26   ANIONGAP 10 13   EGFRNONAA 22* 17*       Significant Imaging: I have reviewed all pertinent imaging results/findings within the past 24 hours.      Head CT    FINDINGS:  There is generalized cerebral volume loss.  There is hypoattenuation in a periventricular fashion, likely sequela of chronic microvascular ischemic change.  There is no evidence of acute major vascular territory infarct, hemorrhage, or mass.  There is no hydrocephalus.  There are no abnormal extra-axial fluid collections.  The paranasal sinuses and mastoid air cells are clear, and there is no evidence of calvarial fracture.  The visualized soft tissues are unremarkable.      Impression       1. No acute intracranial abnormalities noting sequela of chronic microvascular ischemic change and senescent change.      Electronically signed by: Brigido Solis MD  Date: 04/06/2020  Time: 19:47       Assessment and Plan:     74 y/o female consulted for possible seizures    1. Seizures? On exam her involuntary movements seem clonus/myoclonus. Pt seem to be profoundly encephalopathic (infectious, metabolic). Unable to R/O seizures at this moment.   Head CT shows no acute abnormalities.   -Diazepam 5 mg IV x 1 may be of hep for rigidity.   -Palliative Care.          Active Diagnoses:    Diagnosis Date Noted POA     PRINCIPAL PROBLEM:  COVID-19 [U07.1, J98.8] 04/07/2020 Yes    Hypothermia [T68.XXXA] 04/07/2020 Yes    Bradycardia [R00.1] 04/07/2020 Unknown    Hemiplegia [G81.90] 04/07/2020 Unknown    Essential hypertension [I10] 04/11/2019 Yes     Chronic    CKD stage 3 [N18.3] 04/11/2019 Yes     Chronic    GERD (gastroesophageal reflux disease) [K21.9] 04/11/2019 Yes     Chronic    Dementia with behavioral disturbance [F03.91] 04/11/2019 Yes     Chronic      Problems Resolved During this Admission:       VTE Risk Mitigation (From admission, onward)         Ordered     heparin (porcine) injection 5,000 Units  Every 8 hours      04/07/20 0532     IP VTE HIGH RISK PATIENT  Once      04/07/20 0530     Place sequential compression device  Until discontinued      04/07/20 0530                Thank you for your consult. I will follow-up with patient. Please contact us if you have any additional questions.    Elias Lynn MD  Neurology  [facility]

## 2020-04-09 NOTE — CONSULTS
[facility]  Infectious Disease  Consult Note    Patient Name: Keira Starkey  MRN: [id number]  Admission Date: 4/6/2020  Hospital Length of Stay: 2 days  Attending Physician: Uday Pierre MD  Primary Care Provider: Primary Doctor No     Isolation Status: Airborne and Contact and Droplet    Patient information was obtained from patient and ER records.      Inpatient consult to Infectious Diseases  Consult performed by: Didi Ramos MD  Consult ordered by: Alanis Truong MD        Assessment/Plan:     * COVID-19  75M nursing home resident with h/o CKD, Dementia, HTN, hemiplegia, admitted with AMS and hypoxia. Found to have acute on chronic renal failure and hypoxic respiratory distress and bradycardia and hypothermia. covid testing positive. ID consulted for positive blood cultures (1 of 2 BCx from admit 4/6 positive for CoNS). Increasing hypoxia and tachypnea. Has been on vancomycin, ceftriaxone, and azithromycin. Suspect the CoNS blood culture represents contaminant. Suspect worsening dementia 2/2 covid    Recommendations:  - repeat blood cultures. If remain neg x48h, would stop vancomycin  - continue ceftriaxone for possible bacterial pneumonia, but suspect viral/covid etiology > bacterial. Stop azithromycin given qtc prolongation. Could consider doxycycline for atypical coverage given critical illness.   - Continue airborne and contact precautions  -  Hydroxychloroquine will likely help with cytokine storm process and there is in vitro data and clinical experience. However, am extremely hesitant to give this given qtc prolongation of 622 and unproven benefit  - supportive care  - goals of care discussion/code status  - family members/close contacts of positive case should self quarantine x 2 weeks and at least 72 hours after symptoms resolve. They should call [facility] covid-19 hotline if symptoms for testing [phone], but if acute respiratory symptoms, should go to ED.             Thank  you for your consult. I will follow-up with patient. Please contact us if you have any additional questions.    Didi Ramos MD  Infectious Disease  [facility]    Subjective:     Principal Problem: COVID-19    HPI:   75M nursing home resident with h/o CKD, Dementia, HTN, hemiplegia, admitted with AMS and hypoxia. Found to have acute on chronic renal failure and hypoxic respiratory distress and bradycardia and hypothermia. covid testing positive. ID consulted for positive blood cultures (1 of 2 BCx from admit 4/6 positive for CoNS). Oxygen requirements have been increasing overnight. Has been on vancomycin, ceftriaxone, and azithromycin. Pt awake, but not conversational. History obtained by chart review    Noted QTc Int prolonged : 622 ms and 1st degree AV block on EKG    Blood Culture, Routine Gram stain aer bottle: Gram positive cocci in clusters resembling Staph     Blood Culture, Routine Results called to and read back by: Luz Elena Flores    Blood Culture, Routine Gram stain balwinder bottle: Gram positive cocci in clusters resembling Staph     Blood Culture, Routine Positive results previously called    Blood Culture, Routine      Ct head- No acute intracranial abnormalities noting sequela of chronic microvascular ischemic change and senescent change.    cxr- clear        Review of Systems   Unable to perform ROS: Mental status change     Objective:     Vital Signs (Most Recent):  Temp: 96.3 °F (35.7 °C) (04/09/20 0730)  Pulse: 78 (04/09/20 0730)  Resp: (!) 24 (04/09/20 0730)  BP: (!) 168/72 (04/09/20 0730)  SpO2: 95 % (04/09/20 0935) Vital Signs (24h Range):  Temp:  [96.3 °F (35.7 °C)-98.4 °F (36.9 °C)] 96.3 °F (35.7 °C)  Pulse:  [] 78  Resp:  [16-40] 24  SpO2:  [90 %-100 %] 95 %  BP: (102-168)/(57-72) 168/72     Weight: 74.8 kg (164 lb 14.5 oz)  Body mass index is 32.21 kg/m².    Intake/Output Summary (Last 24 hours) at 4/9/2020 1409  Last data filed at 4/9/2020 0600  Gross per 24 hour    Intake 1075.83 ml   Output 800 ml   Net 275.83 ml      Physical Exam   Constitutional: No distress.   Ill-appearing.   HENT:   Head: Normocephalic and atraumatic.   Dry mucous membranes   Eyes: Pupils are equal, round, and reactive to light. EOM are normal.   Neck: No tracheal deviation present.   Contracture   Cardiovascular: Regular rhythm. Tachycardia present.   Pulmonary/Chest: She is in respiratory distress. She has rales.   Tachypnic, shallow breathing. On NRB   Abdominal: Soft. Bowel sounds are normal.   Musculoskeletal: Normal range of motion. She exhibits no deformity.   Skin: Skin is warm. She is not diaphoretic. No pallor.   Psychiatric:   somnolent   Vitals reviewed.      Significant Labs: All pertinent labs within the past 24 hours have been reviewed.    Significant Imaging: I have reviewed all pertinent imaging results/findings within the past 24 hours.

## 2020-04-09 NOTE — PROGRESS NOTES
Artificial Intelligence Notification      Admit Date: 2020  LOS: 2  Code Status: Full Code   Date of Consult: 2020  : 1945  Age: 75 y.o.  Weight:   Wt Readings from Last 1 Encounters:   20 74.8 kg (164 lb 14.5 oz)     Sex: female  Bed: [place] [address] A:   MRN: [id number]  Attending Physician: Uday Pierre MD  Primary Service: Networked reference to record PCT                 Vital Signs (Most Recent):  Temp: 98.3 °F (36.8 °C) (20)  Pulse: 101 (20)  Resp: (!) 28 (20)  BP: 130/64 (20)  SpO2: 98 % (20) Vital Signs (24h Range):  Temp:  [98.1 °F (36.7 °C)-99.5 °F (37.5 °C)] 98.3 °F (36.8 °C)  Pulse:  [] 101  Resp:  [16-40] 28  SpO2:  [90 %-100 %] 98 %  BP: (102-144)/(57-68) 130/64         This encounter was triggered by an Artificial Intelligence Notification.     COVID patient:  Pt found to be having respiratory distress.  Pt tachypneic, repositioned.  O2 sats acceptable.  Plan to transition to ventimask if continues to have SOB and RR doesn't improve.  Monitor closely.

## 2020-04-09 NOTE — ASSESSMENT & PLAN NOTE
Likely due to infection and also worsen failure.  Continue with antibiotic therapy and will give fluid challenge.

## 2020-04-09 NOTE — CONSULTS
[facility]  Palliative Medicine  Consult Note    Patient Name: Keira Starkey  MRN: [id number]  Admission Date: 4/6/2020  Hospital Length of Stay: 2 days  Code Status: DNR   Attending Provider: Uday Pierre MD  Consulting Provider: Milagros Ruiz MD  Primary Care Physician: Primary Doctor No  Principal Problem:COVID-19    Patient information was obtained from daughter, Yamileth and ER records.        Thank you for your consult. I will follow-up with patient. Please contact us if you have any additional questions.    Subjective:     HPI:   No notes on file    Hospital Course:  No notes on file        Past Medical History:   Diagnosis Date    Anemia     Chronic kidney disease     Dementia     Dysphagia     GERD (gastroesophageal reflux disease)     Hemiplegia     Hypertension     Osteoarthritis     Osteomyelitis        Past Surgical History:   Procedure Laterality Date    DEBRIDEMENT OF FOOT Left 4/12/2019    Procedure: DEBRIDEMENT, FOOT, BONE BIOPSY, WOUND VAC PLACEMENT;  Surgeon: Warner Kramer DPM;  Location: [facility];  Service: Podiatry;  Laterality: Left;       Review of patient's allergies indicates:  No Known Allergies    Medications:  Continuous Infusions:   sodium bicarbonate drip 75 mL/hr at 04/09/20 1354     Scheduled Meds:   cefTRIAXone (ROCEPHIN) IVPB  1 g Intravenous Q24H    heparin (porcine)  5,000 Units Subcutaneous Q8H    pantoprazole  40 mg Intravenous Daily     PRN Meds:acetaminophen, hydrALAZINE, ondansetron, sodium chloride 0.9%, Pharmacy to dose Vancomycin consult **AND** vancomycin - pharmacy to dose    Family History     None        Tobacco Use    Smoking status: Unknown If Ever Smoked   Substance and Sexual Activity    Alcohol use: Not Currently    Drug use: Never    Sexual activity: Not on file       Review of Systems   Unable to perform ROS: Mental status change     Objective:     Vital Signs (Most Recent):  Temp: 96.3 °F (35.7 °C) (04/09/20  0730)  Pulse: 78 (04/09/20 0730)  Resp: (!) 24 (04/09/20 0730)  BP: (!) 168/72 (04/09/20 0730)  SpO2: 95 % (04/09/20 0935) Vital Signs (24h Range):  Temp:  [96.3 °F (35.7 °C)-98.4 °F (36.9 °C)] 96.3 °F (35.7 °C)  Pulse:  [] 78  Resp:  [16-40] 24  SpO2:  [90 %-100 %] 95 %  BP: (102-168)/(57-72) 168/72     Weight: 74.8 kg (164 lb 14.5 oz)  Body mass index is 32.21 kg/m².    Review of Symptoms  Symptom Assessment (ESAS 0-10 scale)   ESAS 0 1 2 3 4 5 6 7 8 9 10   Pain              Dyspnea              Anxiety              Nausea              Depression               Anorexia              Fatigue              Insomnia              Restlessness               Agitation                      Physical Exam   Constitutional:   Non responsive   Cardiovascular:   Cardiac monitor   Pulmonary/Chest:   RR 40, on non rebreather   Neurological:   Non responsive   Nursing note and vitals reviewed.      Significant Labs:   All pertinent labs within the past 24 hours have been reviewed.  Recent Lab Results       04/09/20  0615   04/09/20  0603   04/09/20  0602   04/08/20  1618        Albumin   2.1         Alkaline Phosphatase   82         Allens Test       Pass     ALT   31         Anion Gap   8         AST   42         BANDS   3.0         Basophil%   0.0         BILIRUBIN TOTAL   0.3  Comment:  For infants and newborns, interpretation of results should be based  on gestational age, weight and in agreement with clinical  observations.  Premature Infant recommended reference ranges:  Up to 24 hours.............<8.0 mg/dL  Up to 48 hours............<12.0 mg/dL  3-5 days..................<15.0 mg/dL  6-29 days.................<15.0 mg/dL           Site       LR     BUN, Bld   43         Calcium   9.0         Chloride   118         CO2   22         Creatinine   3.1         D-Dimer   1.11  Comment:  The quantitative D-dimer assay should be used as an aid in   the diagnosis of deep vein thrombosis and pulmonary embolism  in patients  with the appropriate presentation and clinical  history. The upper limit of the reference interval and the clinical   cut off   point are identical. Causes of a positive (>0.50 mg/L FEU) D-Dimer   test  include, but are not limited to: DVT, PE, DIC, thrombolytic   therapy, anticoagulant therapy, recent surgery, trauma, or   pregnancy, disseminated malignancy, aortic aneurysm, cirrhosis,  and severe infection. False negative results may occur in   patients with distal DVT.           DelSys       Nasal Can     Differential Method   Manual         eGFR if    16         eGFR if non    14  Comment:  Calculation used to obtain the estimated glomerular filtration  rate (eGFR) is the CKD-EPI equation.            Eosinophil%   0.0         Flow       2     Large/Giant Platelets   Present         Glucose   87         Gran%   56.0         Hematocrit   41.2         Hemoglobin   12.3         Immature Grans (Abs)   CANCELED  Comment:  Mild elevation in immature granulocytes is non specific and   can be seen in a variety of conditions including stress response,   acute inflammation, trauma and pregnancy. Correlation with other   laboratory and clinical findings is essential.    Result canceled by the ancillary.           Immature Granulocytes   CANCELED  Comment:  Result canceled by the ancillary.         Lymph%   22.0         MCH   25.3         MCHC   29.9         MCV   85         Metamyelocytes   2.0         Mode       SPONT     Mono%   13.0         MPV   8.9         Myelocytes   3.0         nRBC   1         Phosphorus     3.3       Platelet Estimate   Appears normal         Platelets   204         POC BE       -6     POC HCO3       15.8     POC PCO2       21.7     POC PH       7.471     POC PO2       60     POC SATURATED O2       93     POC TCO2       16     POCT Glucose 70           Potassium   4.3         Promyelocytes   1.0         PROTEIN TOTAL   6.8         RBC   4.87         RDW   15.4          Sample       ARTERIAL     Sodium   148         Sp02       90     Toxic Granulation   Present         Vancomycin, Random   17.3         WBC   11.79             CBC:   Recent Labs   Lab 04/09/20  0603   WBC 11.79   HGB 12.3   HCT 41.2   MCV 85        BMP:  Recent Labs   Lab 04/09/20  0603   GLU 87   *   K 4.3   *   CO2 22*   BUN 43*   CREATININE 3.1*   CALCIUM 9.0     LFT:  Lab Results   Component Value Date    AST 42 (H) 04/09/2020    ALKPHOS 82 04/09/2020    BILITOT 0.3 04/09/2020     Albumin:   Albumin   Date Value Ref Range Status   04/09/2020 2.1 (L) 3.5 - 5.2 g/dL Final     Protein:   Total Protein   Date Value Ref Range Status   04/09/2020 6.8 6.0 - 8.4 g/dL Final     Lactic acid:   Lab Results   Component Value Date    LACTATE 0.8 04/06/2020    LACTATE 1.0 04/06/2020       Significant Imaging: I have reviewed all pertinent imaging results/findings within the past 24 hours.    Advance Care Planning     Pt was not communicative when I went to see her, nor was she at any point from the moment of her admission.  I had a discussion with her daughter, Yamileth Saldaña, who confirmed she was the POA. She wanted to consult with her aunt, Yamileth, who talked to me on the phone too (speaker phone). The aunt asked a lot of questions after I explained about the pt's global situation, summarizing former situation (patient in a NH for 5 years, total care), present issues, as well as input from Hospitalist, ID, Nephrologist, and Neurologist.  They asked to have some time to have a family discussion before taking a final decision re: DNR.  After two hours, I called Yamileth Saldaña again. She confirmed once more that she was the POA. She reported that they had the family discussion, and that they were all in agreement for the patient to be DNR, with comfort focused treatment.  They expressed the wish to be able at some point to see Ms. Starkey through FaceTime.    Dr. Mcgarry informed of above, code changed  in the pt's record.      > 50% of 120 min visit spent in chart review, face to face discussion of goals of care,  symptom assessment, coordination of care and emotional support.    Milagros Ruiz MD  Palliative Medicine  [facility]

## 2020-04-09 NOTE — PROGRESS NOTES
"[facility]  Progress Note    Patient Name: Keira Starkey  MRN: [id number]  Patient Class: IP- Inpatient   Admission Date: 4/6/2020  Length of Stay: 1 days  Attending Physician: Uday Pierre MD  Primary Care Provider: Primary Doctor No        Subjective:     Principal Problem:COVID-19        HPI:  Per Dr. Kareem Marte:    "Pt is a 76 yo M resident of a nursing home with a h/o CKD, Dementia, HTN, hemiplegia, GERD, OA who presents to the ER from nursing home for AMS and hypoxia.  Pt has been having  A decreased mental state x 2 days along with poor appetite. When EMS arrived they could not get a blood pressure per the ED physician and HR was noted to be as low is 20-30's beats per minute.  In the ED pt was also noted to be hypothermic and manual BP was ~100 systolic.  Pt was placd in ED and given atropine.  After rewarming pt's HR did come up to the 60's.  Pt was tested for COVID and was positive.  Pt was started on oxygen support and antibiotics."    Overview/Hospital Course:  Patient is a 75 year-old woman nursing home resident with hypertension, chronic kidney disease stage III, prior history of left heel osteomyelitis, osteoarthritis, dementia was sent to the emergency department for evaluation for new onset hypoxia and worsening mental status.  Patient tested positive for COVID-19 infection. Patient admitted to the hospital for treatment of infection with acute metabolic encephalopathy and renal failure.  Patient started on empiric antibiotic therapy and treated with supplemental oxygen.    Interval History:  Patient less alert and more confused.    Review of Systems   Unable to perform ROS: Mental status change     Objective:     Vital Signs (Most Recent):  Temp: 98.1 °F (36.7 °C) (04/08/20 2007)  Pulse: 100 (04/08/20 2007)  Resp: (!) 22 (04/08/20 2007)  BP: (!) 144/66 (04/08/20 2007)  SpO2: 98 % (04/08/20 2007) Vital Signs (24h Range):  Temp:  [98.1 °F (36.7 °C)-99.5 °F " (37.5 °C)] 98.1 °F (36.7 °C)  Pulse:  [] 100  Resp:  [18-22] 22  SpO2:  [90 %-100 %] 98 %  BP: (124-144)/(60-68) 144/66     Weight: 74.8 kg (164 lb 14.5 oz)  Body mass index is 32.21 kg/m².    Intake/Output Summary (Last 24 hours) at 4/8/2020 2027  Last data filed at 4/8/2020 1800  Gross per 24 hour   Intake 188.33 ml   Output 900 ml   Net -711.67 ml      Physical Exam   Constitutional: No distress.   Ill-appearing.   HENT:   Head: Normocephalic and atraumatic.   Dry mucous membranes   Eyes: Pupils are equal, round, and reactive to light. EOM are normal.   Neck: No tracheal deviation present.   Contracture   Cardiovascular: Regular rhythm. Tachycardia present.   Pulmonary/Chest: She has rales.   Abdominal: Soft. Bowel sounds are normal.   Musculoskeletal: Normal range of motion. She exhibits no deformity.   Neurological: She displays abnormal reflex. She exhibits abnormal muscle tone.   Skin: Skin is warm. She is not diaphoretic. No pallor.   Psychiatric:   Altered, confused   Vitals reviewed.      Significant Labs: All pertinent labs within the past 24 hours have been reviewed.    Significant Imaging: I have reviewed all pertinent imaging results/findings within the past 24 hours.      Assessment/Plan:      * COVID-19  Patient tested positive for COVID-19.  Continue supportive care and supplemental oxygen to maintain reasonable oxygen saturation (currently on 4 liters/minute of supplemental oxygen by nasal cannula).  Covering for bacterial infection in light of severity of her condition.  Poor prognosis.  Consulted palliative care.    Acute metabolic encephalopathy  Likely due to infection and also worsen failure.  Continue with antibiotic therapy and will give fluid challenge.    Acute renal failure superimposed on stage 3 chronic kidney disease  Clinically appears hypovolemic.  Will give fluid challenge.  Consult nephrology for evaluation and further recommendations.    Essential hypertension  Normotensive.   Holding blood pressure medications.    Dementia with behavioral disturbance  History of refusing medications.  Mental status worse likely due to acute metabolic encephalopathy from infection and worsening renal failure.    Hemiplegia  Chronic, with left sided contractures.  Prior stroke?  Although I do not see clear evidence of this on CT head.    VTE Risk Mitigation (From admission, onward)         Ordered     heparin (porcine) injection 5,000 Units  Every 8 hours      04/07/20 0532     IP VTE HIGH RISK PATIENT  Once      04/07/20 0530     Place sequential compression device  Until discontinued      04/07/20 0530                  Uday Pierre MD  Department of Hospital Medicine   [facility]

## 2020-04-09 NOTE — HOSPITAL COURSE
Patient is a 75 year-old woman nursing home resident with hypertension, chronic kidney disease stage III, prior history of left heel osteomyelitis, osteoarthritis, dementia was sent to the emergency department for evaluation for new onset hypoxia and worsening mental status.  Patient tested positive for COVID-19 infection. Patient admitted to the hospital for treatment of infection with acute metabolic encephalopathy and renal failure.  Patient started on empiric antibiotic therapy and treated with supplemental oxygen. Patient has clinically declined despite medical therapy and supportive care with worsening renal failure. Nephrology service consulted and feels patient is not a good candidate for hemodialysis.   Palliative care service also consulted and had discussions with patient's family who agree to focus on comfort care and DNR ordered placed.    Patient was discharged to nursing home to be DNR and focus on palliative are.

## 2020-04-09 NOTE — ASSESSMENT & PLAN NOTE
Clinically appears hypovolemic.  Will give fluid challenge.  Consult nephrology for evaluation and further recommendations.

## 2020-04-09 NOTE — PLAN OF CARE
Pt responding to touch throughout evening.  Pt continuing to have tremors.    Pt found to be breathing 40 breaths/min on 4 L NC, pt mouth breathing.  Pt sating 96 %.  Pt placed on venti mask; Dr. Marte notified.  Pt continuing to breath 40 breaths/min, desating to 90%.  Pt placed on nonrebreather, sating 96%.  Pt RR decreased to 28 breaths per minute.  Will continue to monitor.  Continuous pulse ox in place.    Bear hugger remains in place on lowest setting, DCed at 0400.  Bear hugger reapplied at 0600 as pt's extremities cold and continuous pulse ox not reading.  Pt SR 90s-ST 100s on telemetry.  Pt afebrile.    Pt turned 2 hrs.      Buchanan catheter in place.  Sodium bicarb infusing per MAR.

## 2020-04-09 NOTE — HPI
75M nursing home resident with h/o CKD, Dementia, HTN, hemiplegia, admitted with AMS and hypoxia. Found to have acute on chronic renal failure and hypoxic respiratory distress and bradycardia and hypothermia. covid testing positive. ID consulted for positive blood cultures (1 of 2 BCx from admit 4/6 positive for CoNS). Oxygen requirements have been increasing overnight. Has been on vancomycin, ceftriaxone, and azithromycin. Pt awake, but not conversational. History obtained by chart review    Noted QTc Int prolonged : 622 ms and 1st degree AV block on EKG    Blood Culture, Routine Gram stain aer bottle: Gram positive cocci in clusters resembling Staph     Blood Culture, Routine Results called to and read back by: Luz Elena Flores    Blood Culture, Routine Gram stain balwinder bottle: Gram positive cocci in clusters resembling Staph     Blood Culture, Routine Positive results previously called    Blood Culture, Routine      Ct head- No acute intracranial abnormalities noting sequela of chronic microvascular ischemic change and senescent change.    cxr- clear

## 2020-04-09 NOTE — PROGRESS NOTES
Pharmacokinetic Assessment Follow Up: IV Vancomycin    Vancomycin serum concentration assessment(s):    The random level was drawn correctly and can be used to guide therapy at this time. The measurement is within the desired definitive target range of 10 to 20 mcg/mL.    Vancomycin Regimen Plan:    Re-dose when the random level is less than 20 mcg/mL, next level to be drawn at 0600 on 4/10/2020     Drug levels (last 3 results):  Recent Labs   Lab Result Units 04/09/20  0603   Vancomycin, Random ug/mL 17.3       Pharmacy will continue to follow and monitor vancomycin.    Please contact pharmacy at extension [phone] for questions regarding this assessment.    Thank you for the consult,   Landon Stein Jr       Patient brief summary:  Keira Starkey is a 75 y.o. female initiated on antimicrobial therapy with IV Vancomycin for treatment of lower respiratory infection    Drug Allergies:   Review of patient's allergies indicates:  No Known Allergies    Actual Body Weight:   74.8 kg    Renal Function:   Estimated Creatinine Clearance: 13.9 mL/min (A) (based on SCr of 3.1 mg/dL (H)).,     Dialysis Method (if applicable):  N/A    CBC (last 72 hours):  Recent Labs   Lab Result Units 04/06/20  1545 04/07/20  0610 04/08/20  0504   WBC K/uL 6.36 8.44 9.60   Hemoglobin g/dL 13.6 11.7* 11.4*   Hematocrit % 44.9 38.0 36.4*   Platelets K/uL 185 235 200   Gran% % 73.4* 74.0* 51.0   Lymph% % 14.6* 14.0* 35.0   Mono% % 7.2 6.0 5.0   Eosinophil% % 0.3 0.0 1.0   Basophil% % 0.6 0.0 0.0   Differential Method  Automated Manual Manual       Metabolic Panel (last 72 hours):  Recent Labs   Lab Result Units 04/06/20  1747 04/06/20  1750 04/07/20  0610 04/08/20  0504 04/09/20  0602 04/09/20  0603   Sodium mmol/L 142  --  142 146*  --  148*   Potassium mmol/L 4.3  --  4.7 4.5  --  4.3   Chloride mmol/L 115*  --  114* 118*  --  118*   CO2 mmol/L 17*  --  18* 15*  --  22*   Glucose mg/dL 173*  --  99 62*  --  87   Glucose, UA   --  Negative   --   --   --   --    BUN, Bld mg/dL 48*  --  49* 47*  --  43*   Creatinine mg/dL 1.8*  --  2.1* 2.7*  --  3.1*   Albumin g/dL 2.1*  --  2.2* 2.1*  --  2.1*   Total Bilirubin mg/dL 0.1  --  0.1 0.2  --  0.3   Alkaline Phosphatase U/L 53*  --  66 59  --  82   AST U/L 27  --  32 27  --  42*   ALT U/L 24  --  31 26  --  31   Magnesium mg/dL 2.5  --   --   --   --   --    Phosphorus mg/dL  --   --   --   --  3.3  --        Vancomycin Administrations:  vancomycin given in the last 96 hours                     vancomycin 1.5 g in dextrose 5 % 250 mL IVPB (ready to mix) (mg) 1,500 mg New Bag 04/07/20 2337                      Microbiologic Results:  Microbiology Results (last 7 days)       Procedure Component Value Units Date/Time    Blood culture x two cultures. Draw prior to antibiotics. [[id number]] Collected:  04/06/20 1545    Order Status:  Completed Specimen:  Blood from Peripheral, Forearm, Left Updated:  04/08/20 2303     Blood Culture, Routine No Growth to date      No Growth to date      No Growth to date    Narrative:       Aerobic and anaerobic    Blood culture x two cultures. Draw prior to antibiotics. [[id number]]  (Abnormal) Collected:  04/06/20 1728    Order Status:  Completed Specimen:  Blood from Line, Femoral, Left Updated:  04/08/20 0737     Blood Culture, Routine Gram stain aer bottle: Gram positive cocci in clusters resembling Staph       Results called to and read back by: Luz Elena Flores      Gram stain balwinder bottle: Gram positive cocci in clusters resembling Staph       Positive results previously called      COAGULASE-NEGATIVE STAPHYLOCOCCUS SPECIES  Identification and susceptibility pending      Narrative:       Aerobic and anaerobic

## 2020-04-09 NOTE — SUBJECTIVE & OBJECTIVE
Interval History:  Patient more awake and alert this morning.     Review of Systems   Unable to perform ROS: Mental status change     Objective:     Vital Signs (Most Recent):  Temp: 97.1 °F (36.2 °C) (04/09/20 1200)  Pulse: 78 (04/09/20 0730)  Resp: (!) 22 (04/09/20 1200)  BP: (!) 106/56 (04/09/20 1200)  SpO2: 97 % (04/09/20 1200) Vital Signs (24h Range):  Temp:  [96.3 °F (35.7 °C)-98.3 °F (36.8 °C)] 97.1 °F (36.2 °C)  Pulse:  [] 78  Resp:  [16-40] 22  SpO2:  [90 %-100 %] 97 %  BP: (102-168)/(56-72) 106/56     Weight: 74.8 kg (164 lb 14.5 oz)  Body mass index is 32.21 kg/m².    Intake/Output Summary (Last 24 hours) at 4/9/2020 1818  Last data filed at 4/9/2020 0600  Gross per 24 hour   Intake 887.5 ml   Output 550 ml   Net 337.5 ml      Physical Exam   Constitutional: No distress.   Ill-appearing.   HENT:   Head: Normocephalic and atraumatic.   Dry mucous membranes   Eyes: Pupils are equal, round, and reactive to light. EOM are normal.   Neck: No tracheal deviation present.   Contracture   Cardiovascular: Regular rhythm. Tachycardia present.   Pulmonary/Chest: She has rales.   Abdominal: Soft. Bowel sounds are normal.   Musculoskeletal: Normal range of motion. She exhibits no deformity.   Neurological: She displays abnormal reflex. She exhibits abnormal muscle tone.   Altered, confused, but more awake today and able to speak a few words.   Skin: Skin is warm. She is not diaphoretic. No pallor.   Vitals reviewed.      Significant Labs: All pertinent labs within the past 24 hours have been reviewed.    Significant Imaging: I have reviewed all pertinent imaging results/findings within the past 24 hours.

## 2020-04-09 NOTE — ASSESSMENT & PLAN NOTE
Likely due to infection and also renal failure. Mental status mildly improved today although overall her trajectory has been a negative.  Continue with antibiotic therapy.  Repeat blood cultures today.

## 2020-04-10 LAB
ALBUMIN SERPL BCP-MCNC: 2 G/DL (ref 3.5–5.2)
ALP SERPL-CCNC: 92 U/L (ref 55–135)
ALT SERPL W/O P-5'-P-CCNC: 31 U/L (ref 10–44)
ANION GAP SERPL CALC-SCNC: 11 MMOL/L (ref 8–16)
AST SERPL-CCNC: 35 U/L (ref 10–40)
BACTERIA BLD CULT: NORMAL
BASOPHILS # BLD AUTO: ABNORMAL K/UL (ref 0–0.2)
BASOPHILS NFR BLD: 0 % (ref 0–1.9)
BILIRUB SERPL-MCNC: 0.3 MG/DL (ref 0.1–1)
BUN SERPL-MCNC: 35 MG/DL (ref 8–23)
CALCIUM SERPL-MCNC: 9 MG/DL (ref 8.7–10.5)
CHLORIDE SERPL-SCNC: 113 MMOL/L (ref 95–110)
CO2 SERPL-SCNC: 23 MMOL/L (ref 23–29)
CREAT SERPL-MCNC: 2.7 MG/DL (ref 0.5–1.4)
DIFFERENTIAL METHOD: ABNORMAL
EOSINOPHIL # BLD AUTO: ABNORMAL K/UL (ref 0–0.5)
EOSINOPHIL NFR BLD: 2 % (ref 0–8)
ERYTHROCYTE [DISTWIDTH] IN BLOOD BY AUTOMATED COUNT: 15.3 % (ref 11.5–14.5)
EST. GFR  (AFRICAN AMERICAN): 19 ML/MIN/1.73 M^2
EST. GFR  (NON AFRICAN AMERICAN): 17 ML/MIN/1.73 M^2
GLUCOSE SERPL-MCNC: 81 MG/DL (ref 70–110)
HCT VFR BLD AUTO: 40.7 % (ref 37–48.5)
HGB BLD-MCNC: 12 G/DL (ref 12–16)
IMM GRANULOCYTES # BLD AUTO: ABNORMAL K/UL (ref 0–0.04)
IMM GRANULOCYTES NFR BLD AUTO: ABNORMAL % (ref 0–0.5)
LYMPHOCYTES # BLD AUTO: ABNORMAL K/UL (ref 1–4.8)
LYMPHOCYTES NFR BLD: 19 % (ref 18–48)
MCH RBC QN AUTO: 25.1 PG (ref 27–31)
MCHC RBC AUTO-ENTMCNC: 29.5 G/DL (ref 32–36)
MCV RBC AUTO: 85 FL (ref 82–98)
METAMYELOCYTES NFR BLD MANUAL: 12 %
MONOCYTES # BLD AUTO: ABNORMAL K/UL (ref 0.3–1)
MONOCYTES NFR BLD: 7 % (ref 4–15)
MYELOCYTES NFR BLD MANUAL: 3 %
NEUTROPHILS NFR BLD: 55 % (ref 38–73)
NEUTS BAND NFR BLD MANUAL: 2 %
NRBC BLD-RTO: 1 /100 WBC
PLATELET # BLD AUTO: 182 K/UL (ref 150–350)
PLATELET BLD QL SMEAR: ABNORMAL
PMV BLD AUTO: 9.3 FL (ref 9.2–12.9)
POCT GLUCOSE: 112 MG/DL (ref 70–110)
POCT GLUCOSE: 94 MG/DL (ref 70–110)
POCT GLUCOSE: 96 MG/DL (ref 70–110)
POCT GLUCOSE: 97 MG/DL (ref 70–110)
POTASSIUM SERPL-SCNC: 4.2 MMOL/L (ref 3.5–5.1)
PROT SERPL-MCNC: 6.7 G/DL (ref 6–8.4)
RBC # BLD AUTO: 4.78 M/UL (ref 4–5.4)
SODIUM SERPL-SCNC: 147 MMOL/L (ref 136–145)
VANCOMYCIN SERPL-MCNC: 17.8 UG/ML
WBC # BLD AUTO: 8.65 K/UL (ref 3.9–12.7)

## 2020-04-10 PROCEDURE — 63600175 PHARM REV CODE 636 W HCPCS: Performed by: FAMILY MEDICINE

## 2020-04-10 PROCEDURE — 63600175 PHARM REV CODE 636 W HCPCS: Performed by: EMERGENCY MEDICINE

## 2020-04-10 PROCEDURE — 63600175 PHARM REV CODE 636 W HCPCS: Performed by: INTERNAL MEDICINE

## 2020-04-10 PROCEDURE — 36415 COLL VENOUS BLD VENIPUNCTURE: CPT

## 2020-04-10 PROCEDURE — C9113 INJ PANTOPRAZOLE SODIUM, VIA: HCPCS | Performed by: EMERGENCY MEDICINE

## 2020-04-10 PROCEDURE — 25000003 PHARM REV CODE 250: Performed by: INTERNAL MEDICINE

## 2020-04-10 PROCEDURE — 21400001 HC TELEMETRY ROOM

## 2020-04-10 PROCEDURE — 85060 BLOOD SMEAR INTERPRETATION: CPT | Mod: ,,, | Performed by: PATHOLOGY

## 2020-04-10 PROCEDURE — 80053 COMPREHEN METABOLIC PANEL: CPT

## 2020-04-10 PROCEDURE — 85007 BL SMEAR W/DIFF WBC COUNT: CPT

## 2020-04-10 PROCEDURE — 99232 SBSQ HOSP IP/OBS MODERATE 35: CPT | Mod: ,,, | Performed by: PSYCHIATRY & NEUROLOGY

## 2020-04-10 PROCEDURE — 80202 ASSAY OF VANCOMYCIN: CPT

## 2020-04-10 PROCEDURE — 99232 PR SUBSEQUENT HOSPITAL CARE,LEVL II: ICD-10-PCS | Mod: ,,, | Performed by: PSYCHIATRY & NEUROLOGY

## 2020-04-10 PROCEDURE — 85060 PATHOLOGIST REVIEW: ICD-10-PCS | Mod: ,,, | Performed by: PATHOLOGY

## 2020-04-10 PROCEDURE — 85027 COMPLETE CBC AUTOMATED: CPT

## 2020-04-10 PROCEDURE — 25000003 PHARM REV CODE 250: Performed by: FAMILY MEDICINE

## 2020-04-10 RX ORDER — FAMOTIDINE 20 MG/1
20 TABLET, FILM COATED ORAL DAILY
Qty: 30 TABLET | Refills: 11
Start: 2020-04-10 | End: 2021-04-10

## 2020-04-10 RX ORDER — LEVOFLOXACIN 500 MG/1
500 TABLET, FILM COATED ORAL
Qty: 3 TABLET | Refills: 0 | Status: SHIPPED | OUTPATIENT
Start: 2020-04-10 | End: 2020-04-15

## 2020-04-10 RX ORDER — ACETAMINOPHEN 325 MG/1
650 TABLET ORAL EVERY 6 HOURS PRN
Qty: 30 TABLET | Refills: 0 | Status: SHIPPED | OUTPATIENT
Start: 2020-04-10

## 2020-04-10 RX ORDER — MORPHINE SULFATE 15 MG/1
15 TABLET, FILM COATED, EXTENDED RELEASE ORAL 3 TIMES DAILY PRN
Qty: 21 TABLET | Refills: 0 | Status: SHIPPED | OUTPATIENT
Start: 2020-04-10 | End: 2020-04-17

## 2020-04-10 RX ORDER — MORPHINE SULFATE 15 MG/1
15 TABLET, FILM COATED, EXTENDED RELEASE ORAL EVERY 12 HOURS
Status: DISCONTINUED | OUTPATIENT
Start: 2020-04-10 | End: 2020-04-14 | Stop reason: HOSPADM

## 2020-04-10 RX ORDER — MORPHINE SULFATE 15 MG/1
15 TABLET, FILM COATED, EXTENDED RELEASE ORAL 3 TIMES DAILY PRN
Qty: 21 TABLET | Refills: 0 | Status: SHIPPED | OUTPATIENT
Start: 2020-04-10 | End: 2020-04-10 | Stop reason: SDUPTHER

## 2020-04-10 RX ORDER — ALPRAZOLAM 0.25 MG/1
0.25 TABLET ORAL 3 TIMES DAILY PRN
Qty: 21 TABLET | Refills: 0 | Status: SHIPPED | OUTPATIENT
Start: 2020-04-10 | End: 2020-04-17

## 2020-04-10 RX ORDER — ALPRAZOLAM 0.25 MG/1
0.25 TABLET ORAL 3 TIMES DAILY PRN
Qty: 21 TABLET | Refills: 0 | Status: SHIPPED | OUTPATIENT
Start: 2020-04-10 | End: 2020-04-10 | Stop reason: SDUPTHER

## 2020-04-10 RX ADMIN — MORPHINE SULFATE 15 MG: 15 TABLET, FILM COATED, EXTENDED RELEASE ORAL at 01:04

## 2020-04-10 RX ADMIN — PANTOPRAZOLE SODIUM 40 MG: 40 INJECTION, POWDER, FOR SOLUTION INTRAVENOUS at 09:04

## 2020-04-10 RX ADMIN — VANCOMYCIN HYDROCHLORIDE 500 MG: 500 INJECTION, POWDER, LYOPHILIZED, FOR SOLUTION INTRAVENOUS at 01:04

## 2020-04-10 RX ADMIN — HEPARIN SODIUM 5000 UNITS: 5000 INJECTION INTRAVENOUS; SUBCUTANEOUS at 06:04

## 2020-04-10 RX ADMIN — SODIUM BICARBONATE: 84 INJECTION, SOLUTION INTRAVENOUS at 11:04

## 2020-04-10 RX ADMIN — MORPHINE SULFATE 15 MG: 15 TABLET, FILM COATED, EXTENDED RELEASE ORAL at 08:04

## 2020-04-10 RX ADMIN — HEPARIN SODIUM 5000 UNITS: 5000 INJECTION INTRAVENOUS; SUBCUTANEOUS at 09:04

## 2020-04-10 RX ADMIN — SODIUM BICARBONATE: 84 INJECTION, SOLUTION INTRAVENOUS at 06:04

## 2020-04-10 RX ADMIN — CEFTRIAXONE 1 G: 1 INJECTION, POWDER, FOR SOLUTION INTRAMUSCULAR; INTRAVENOUS at 08:04

## 2020-04-10 NOTE — DISCHARGE SUMMARY
"[facility] Hospital Medicine  Discharge Summary      Patient Name: Keira Starkey  MRN: [id number]  Admission Date: 4/6/2020  Hospital Length of Stay: 3 days  Discharge Date and Time:  04/10/2020 12:40 PM  Attending Physician: Kareem Marte MD   Discharging Provider: Kareem Marte MD  Primary Care Provider: Primary Doctor No      HPI:   Per Dr. Kareem Marte:    "Pt is a 74 yo M resident of a nursing home with a h/o CKD, Dementia, HTN, hemiplegia, GERD, OA who presents to the ER from nursing home for AMS and hypoxia.  Pt has been having  A decreased mental state x 2 days along with poor appetite. When EMS arrived they could not get a blood pressure per the ED physician and HR was noted to be as low is 20-30's beats per minute.  In the ED pt was also noted to be hypothermic and manual BP was ~100 systolic.  Pt was placd in ED and given atropine.  After rewarming pt's HR did come up to the 60's.  Pt was tested for COVID and was positive.  Pt was started on oxygen support and antibiotics."    * No surgery found *      Hospital Course:   Patient is a 75 year-old woman nursing home resident with hypertension, chronic kidney disease stage III, prior history of left heel osteomyelitis, osteoarthritis, dementia was sent to the emergency department for evaluation for new onset hypoxia and worsening mental status.  Patient tested positive for COVID-19 infection. Patient admitted to the hospital for treatment of infection with acute metabolic encephalopathy and renal failure.  Patient started on empiric antibiotic therapy and treated with supplemental oxygen. Patient has clinically declined despite medical therapy and supportive care with worsening renal failure. Nephrology service consulted and feels patient is not a good candidate for hemodialysis.   Palliative care service also consulted and had discussions with patient's family who agree to focus on comfort care and DNR ordered placed.    Patient was " discharged to nursing home to be DNR and focus on palliative care/comfort focused care.  Would recommend against further hosptialization as prognosis is poor and further hospitalizations will not affect overall outcome.       Consults:   Consults (From admission, onward)        Status Ordering Provider     Inpatient consult to Infectious Diseases  Once     Provider:  Didi Ramos    Completed LAVINIA TRUONG     Inpatient consult to Nephrology  Once     Provider:  Lavinia Truong MD    Completed NALLELY MURRY     Inpatient consult to Neurology  Once     Provider:  Elias Lynn MD    Completed NALLELY MURRY     Inpatient consult to Palliative Care  Once     Provider:  Patti Quintana, NP    Completed LAVINIA TRUONG     Inpatient consult to Social Work  Once     Provider:  (Not yet assigned)    Acknowledged PATTI QUINTANA     Inpatient consult to Spiritual Care  Once     Provider:  (Not yet assigned)    Acknowledged LAVINIA TRUONG     Pharmacy to dose Vancomycin consult  Once     Provider:  (Not yet assigned)    Acknowledged SIMI CHAUDHRY          No new Assessment & Plan notes have been filed under this hospital service since the last note was generated.  Service: Hospital Medicine    Final Active Diagnoses:    Diagnosis Date Noted POA    PRINCIPAL PROBLEM:  Acute respiratory distress syndrome (ARDS) due to COVID-19 virus [U07.1, J80] 04/07/2020 Yes    Palliative care encounter [Z51.5] 04/09/2020 Not Applicable    Acute metabolic encephalopathy [G93.41] 04/08/2020 Yes    Hemiplegia [G81.90] 04/07/2020 Unknown    Essential hypertension [I10] 04/11/2019 Yes     Chronic    Acute renal failure superimposed on stage 3 chronic kidney disease [N17.9, N18.3] 04/11/2019 Yes    Dementia with behavioral disturbance [F03.91] 04/11/2019 Yes     Chronic      Problems Resolved During this Admission:    Diagnosis Date Noted Date Resolved POA    Bradycardia [R00.1] 04/07/2020 04/08/2020 Unknown    Hypothermia  [T68.XXXA] 04/07/2020 04/08/2020 Yes    GERD (gastroesophageal reflux disease) [K21.9] 04/11/2019 04/08/2020 Yes     Chronic       Discharged Condition: poor    Disposition: Home or Self Care    Follow Up:    Patient Instructions:      Diet Adult Regular       Significant Diagnostic Studies: see chart    Pending Diagnostic Studies:     None         Medications:  Reconciled Home Medications:      Medication List      START taking these medications    acetaminophen 325 MG tablet  Commonly known as:  TYLENOL  Take 2 tablets (650 mg total) by mouth every 6 (six) hours as needed.     ALPRAZolam 0.25 MG tablet  Commonly known as:  XANAX  Take 1 tablet (0.25 mg total) by mouth 3 (three) times daily as needed for Anxiety.     levoFLOXacin 500 MG tablet  Commonly known as:  LEVAQUIN  Take 1 tablet (500 mg total) by mouth every 48 hours. for 3 doses     morphine 15 MG 12 hr tablet  Commonly known as:  MS CONTIN  Take 1 tablet (15 mg total) by mouth 3 (three) times daily as needed (pain and respiratory distress).        CONTINUE taking these medications    ARIPiprazole 15 MG Tab  Commonly known as:  ABILIFY  Take 15 mg by mouth nightly.     carvediloL 6.25 MG tablet  Commonly known as:  COREG  Take 6.25 mg by mouth 2 (two) times daily with meals.     donepeziL 10 MG tablet  Commonly known as:  ARICEPT  Take 10 mg by mouth every evening.     fluticasone propionate 50 mcg/actuation nasal spray  Commonly known as:  FLONASE  1 spray by Each Nare route once daily.     LEXAPRO 10 MG tablet  Generic drug:  escitalopram oxalate  Take 15 mg by mouth once daily.     multivitamin capsule  Take 1 capsule by mouth once daily.     protein supplement Liqd  Take by mouth.     ranitidine 150 MG tablet  Commonly known as:  ZANTAC  Take 150 mg by mouth once daily.     traZODone 50 MG tablet  Commonly known as:  DESYREL  Take 25 mg by mouth every evening.     VITAMIN C 100 MG tablet  Generic drug:  ascorbic acid (vitamin C)  Take 500 mg by mouth  once daily.     vitamin D 1000 units Tab  Commonly known as:  VITAMIN D3  Take 1,000 Units by mouth once daily.     zinc sulfate 220 (50) mg capsule  Commonly known as:  ZINCATE  Take 220 mg by mouth once daily.        STOP taking these medications    heparin (porcine) in 0.9% NaCl 10,000 unit/1,000 mL Solp            Indwelling Lines/Drains at time of discharge:   Lines/Drains/Airways     Central Venous Catheter Line            Percutaneous Central Line Insertion/Assessment - Triple Lumen  04/06/20 1832 left femoral vein 3 days          Drain                 Urethral Catheter 04/06/20 1833 Non-latex 16 Fr. 3 days                Time spent on the discharge of patient: 45 minutes  Patient was seen and examined on the date of discharge and determined to be suitable for discharge.         Kareem Marte MD  Department of Hospital Medicine  [facility]    UPDATE:  4/13  DISCHARGE DONE ON 4/13 INSTEAD AS NH WANTED TO WAIT TILL Monday TO ACCEPT HER.  PATIENT WATCHED OVER WEEKEND AND DISCHARGED TO NH ON 4/13/20

## 2020-04-10 NOTE — PROGRESS NOTES
[facility]  Neurology  Progress Note    Patient Name: Keira Starkey  MRN: [id number]  Admission Date: 4/6/2020  Hospital Length of Stay: 3 days  Code Status: DNR   Attending Provider: Kareem Marte MD  Primary Care Physician: Primary Doctor No   Principal Problem:Acute respiratory distress syndrome (ARDS) due to COVID-19 virus    Subjective:     Interval History: 74 y/o female with medical Hx as listed below was brought to ED for shortness of breath and AMS. In ED she was found to be markedly bradycardic and hypothermic. COVID-19 positive. Pt was rewarmed and after atropine her heart rate has been in the 60's-100's. Pt has been exhibiting involuntary movements of extremities and has remained poorly responsive.      -4/9/20: Pt remains poorly responsive.    -4/10/20: No improvement in neurological status.    Current Neurological Medications:     Current Facility-Administered Medications   Medication Dose Route Frequency Provider Last Rate Last Dose    acetaminophen tablet 650 mg  650 mg Oral Q6H PRN Kareem Marte MD        cefTRIAXone (ROCEPHIN) 1 g in dextrose 5 % 50 mL IVPB  1 g Intravenous Q24H Cipriano Quijano MD   1 g at 04/09/20 2152    heparin (porcine) injection 5,000 Units  5,000 Units Subcutaneous Q8H Kareem Marte MD   5,000 Units at 04/10/20 0600    hydrALAZINE injection 10 mg  10 mg Intravenous Q8H PRN Kareem Marte MD        morphine 12 hr tablet 15 mg  15 mg Oral Q12H Kareem Marte MD   15 mg at 04/10/20 1318    ondansetron injection 4 mg  4 mg Intravenous Q8H PRN Cipriano Quijano MD        pantoprazole injection 40 mg  40 mg Intravenous Daily Cipriano Quijano MD   40 mg at 04/10/20 0934    sodium bicarbonate 1 mEq/mL (8.4 %) 75 mEq in dextrose 5 % 1,000 mL infusion   Intravenous Continuous Alanis Truong MD 75 mL/hr at 04/10/20 0638      sodium chloride 0.9% flush 10 mL  10 mL Intravenous PRN Cipriano Quijano MD        vancomycin - pharmacy to dose   Intravenous pharmacy to  manage frequency Kareem Marte MD           Review of Systems   Unable to obtain due to AMS    Objective:     Vital Signs (Most Recent):  Temp: 99.2 °F (37.3 °C) (04/10/20 1517)  Pulse: (!) 56 (04/10/20 1517)  Resp: 18 (04/10/20 1517)  BP: (!) 126/59 (04/10/20 1517)  SpO2: 98 % (04/10/20 1517) Vital Signs (24h Range):  Temp:  [96.1 °F (35.6 °C)-99.2 °F (37.3 °C)] 99.2 °F (37.3 °C)  Pulse:  [56-81] 56  Resp:  [18-22] 18  SpO2:  [92 %-99 %] 98 %  BP: ()/(57-78) 126/59     Weight: 74.8 kg (164 lb 14.5 oz)  Body mass index is 32.21 kg/m².    Physical Exam  Constitutional: No distress.   HENT:   Head: Normocephalic.   Eyes: Right eye exhibits no discharge. Left eye exhibits no discharge.   Neck: Normal range of motion.   Cardiovascular: Normal rate.   Skin: She is not diaphoretic.         NEUROLOGICAL EXAMINATION:      MENTAL STATUS        Unresponsive to verbal stimuli      CRANIAL NERVES      CN III, IV, VI   Right pupil: Size: 2 mm. Shape: regular.   Left pupil: Size: 2 mm. Shape: regular.   Nystagmus: none   Conjugate gaze: present     CN VII   Right facial weakness: none  Left facial weakness: none     MOTOR EXAM   Right arm tone: spastic  Left arm tone: spastic       Clonus on four extremities upon passive ROM      SENSORY EXAM        No response to noxious stimulation              Significant Labs:   CBC:   Recent Labs   Lab 04/09/20  0603 04/10/20  0625   WBC 11.79 8.65   HGB 12.3 12.0   HCT 41.2 40.7    182     CMP:   Recent Labs   Lab 04/09/20  0603 04/10/20  0625   GLU 87 81   * 147*   K 4.3 4.2   * 113*   CO2 22* 23   BUN 43* 35*   CREATININE 3.1* 2.7*   CALCIUM 9.0 9.0   PROT 6.8 6.7   ALBUMIN 2.1* 2.0*   BILITOT 0.3 0.3   ALKPHOS 82 92   AST 42* 35   ALT 31 31   ANIONGAP 8 11   EGFRNONAA 14* 17*       Significant Imaging: I have reviewed all pertinent imaging results/findings within the past 24 hours.       Assessment and Plan:     74 y/o female consulted for possible seizures     1.  Seizures? On exam her involuntary movements seem clonus/myoclonus. Pt seem to be profoundly encephalopathic (infectious, metabolic). Unable to R/O seizures at this moment.           Head CT shows no acute abnormalities. Poor prognosis.           -Transfer to hospice pending.    Active Diagnoses:    Diagnosis Date Noted POA    PRINCIPAL PROBLEM:  Acute respiratory distress syndrome (ARDS) due to COVID-19 virus [U07.1, J80] 04/07/2020 Yes    Palliative care encounter [Z51.5] 04/09/2020 Not Applicable    Acute metabolic encephalopathy [G93.41] 04/08/2020 Yes    Hemiplegia [G81.90] 04/07/2020 Unknown    Essential hypertension [I10] 04/11/2019 Yes     Chronic    Acute renal failure superimposed on stage 3 chronic kidney disease [N17.9, N18.3] 04/11/2019 Yes    Dementia with behavioral disturbance [F03.91] 04/11/2019 Yes     Chronic      Problems Resolved During this Admission:    Diagnosis Date Noted Date Resolved POA    Hypothermia [T68.XXXA] 04/07/2020 04/08/2020 Yes    Bradycardia [R00.1] 04/07/2020 04/08/2020 Unknown    GERD (gastroesophageal reflux disease) [K21.9] 04/11/2019 04/08/2020 Yes     Chronic       VTE Risk Mitigation (From admission, onward)         Ordered     heparin (porcine) injection 5,000 Units  Every 8 hours      04/07/20 0532     IP VTE HIGH RISK PATIENT  Once      04/07/20 0530     Place sequential compression device  Until discontinued      04/07/20 0530                Elias Lynn MD  Neurology  [facility]

## 2020-04-10 NOTE — PROGRESS NOTES
Pharmacokinetic Assessment Follow Up: IV Vancomycin    Vancomycin serum concentration assessment(s):    The random level was drawn correctly and can be used to guide therapy at this time. The measurement is within the desired definitive target range of 10 to 20 mcg/mL.    Vancomycin Regimen Plan:    Give 500 mg today.  Re-dose when the random level is less than 20 mcg/mL, next level to be drawn at 0600 on 4/11/2020     Drug levels (last 3 results):  Recent Labs   Lab Result Units 04/09/20  0603 04/10/20  0624   Vancomycin, Random ug/mL 17.3 17.8       Pharmacy will continue to follow and monitor vancomycin.    Please contact pharmacy at extension [phone] for questions regarding this assessment.    Thank you for the consult,   Landon Stein Jr       Patient brief summary:  Keira Starkey is a 75 y.o. female initiated on antimicrobial therapy with IV Vancomycin for treatment of bacteremia    Drug Allergies:   Review of patient's allergies indicates:  No Known Allergies    Actual Body Weight:   74.8 kg    Renal Function:   Estimated Creatinine Clearance: 16 mL/min (A) (based on SCr of 2.7 mg/dL (H)).,     Dialysis Method (if applicable):  N/A    CBC (last 72 hours):  Recent Labs   Lab Result Units 04/08/20  0504 04/09/20  0603 04/10/20  0625   WBC K/uL 9.60 11.79 8.65   Hemoglobin g/dL 11.4* 12.3 12.0   Hematocrit % 36.4* 41.2 40.7   Platelets K/uL 200 204 182   Gran% % 51.0 56.0 55.0   Lymph% % 35.0 22.0 19.0   Mono% % 5.0 13.0 7.0   Eosinophil% % 1.0 0.0 2.0   Basophil% % 0.0 0.0 0.0   Differential Method  Manual Manual Manual       Metabolic Panel (last 72 hours):  Recent Labs   Lab Result Units 04/08/20  0504 04/09/20  0602 04/09/20  0603 04/10/20  0625   Sodium mmol/L 146*  --  148* 147*   Potassium mmol/L 4.5  --  4.3 4.2   Chloride mmol/L 118*  --  118* 113*   CO2 mmol/L 15*  --  22* 23   Glucose mg/dL 62*  --  87 81   BUN, Bld mg/dL 47*  --  43* 35*   Creatinine mg/dL 2.7*  --  3.1* 2.7*   Albumin g/dL  2.1*  --  2.1* 2.0*   Total Bilirubin mg/dL 0.2  --  0.3 0.3   Alkaline Phosphatase U/L 59  --  82 92   AST U/L 27  --  42* 35   ALT U/L 26  --  31 31   Phosphorus mg/dL  --  3.3  --   --        Vancomycin Administrations:  vancomycin given in the last 96 hours                     vancomycin 500 mg in dextrose 5 % 100 mL IVPB (ready to mix system) (mg) 500 mg New Bag 04/09/20 0952                      Microbiologic Results:  Microbiology Results (last 7 days)       Procedure Component Value Units Date/Time    Blood culture x two cultures. Draw prior to antibiotics. [[id number]] Collected:  04/06/20 1545    Order Status:  Completed Specimen:  Blood from Peripheral, Forearm, Left Updated:  04/09/20 2303     Blood Culture, Routine No Growth to date      No Growth to date      No Growth to date      No Growth to date    Narrative:       Aerobic and anaerobic    Blood culture [[id number]] Collected:  04/09/20 1151    Order Status:  Completed Specimen:  Blood Updated:  04/09/20 2112     Blood Culture, Routine No Growth to date    Blood culture [[id number]] Collected:  04/09/20 1150    Order Status:  Completed Specimen:  Blood Updated:  04/09/20 2112     Blood Culture, Routine No Growth to date    Blood culture x two cultures. Draw prior to antibiotics. [[id number]]  (Abnormal) Collected:  04/06/20 1728    Order Status:  Completed Specimen:  Blood from Line, Femoral, Left Updated:  04/09/20 1312     Blood Culture, Routine Gram stain aer bottle: Gram positive cocci in clusters resembling Staph       Results called to and read back by: Luz Elena Flores      Gram stain balwinder bottle: Gram positive cocci in clusters resembling Staph       Positive results previously called      STAPHYLOCOCCUS COHNII SUBSPECIES UREALYTICUS  Organism is a probable contaminant      Narrative:       Aerobic and anaerobic

## 2020-04-10 NOTE — PLAN OF CARE
[facility]     Department of Hospital Medicine     [address] [place] [address]     [phone] after hours  [phone] fax       NURSING HOME ORDERS    04/10/2020    Admit to Nursing Home:  Palliative Care Bed                                                     Diagnoses:  Active Hospital Problems    Diagnosis  POA    *Acute respiratory distress syndrome (ARDS) due to COVID-19 virus [U07.1, J80]  Yes     Priority: 1 - High    Palliative care encounter [Z51.5]  Not Applicable    Acute metabolic encephalopathy [G93.41]  Yes    Hemiplegia [G81.90]  Unknown    Essential hypertension [I10]  Yes     Chronic    Acute renal failure superimposed on stage 3 chronic kidney disease [N17.9, N18.3]  Yes    Dementia with behavioral disturbance [F03.91]  Yes     Chronic      Resolved Hospital Problems    Diagnosis Date Resolved POA    Bradycardia [R00.1] 04/08/2020 Unknown     Priority: 2     Hypothermia [T68.XXXA] 04/08/2020 Yes     Priority: 3     GERD (gastroesophageal reflux disease) [K21.9] 04/08/2020 Yes     Chronic       Patient is homebound due to:  Acute respiratory distress syndrome (ARDS) due to COVID-19 virus    Allergies:Review of patient's allergies indicates:  No Known Allergies    Vitals:  Do not check    Diet: regular or as tolerated for comfort feeding    Acitivities:   Bed rest    LABS:  No labs    Nursing Precautions:  Droplet, airborne, and contact precautions   - Aspiration precautions:             - Total assistance with meals            -  Upright 90 degrees befor during and after meals             -  Suction at bedside          - Fall precautions per nursing home protocol   - Seizure precaution per half-way protocol   - Decubitus precautions:        -  for positioning   - Pressure reducing foam mattress   - Turn patient every two hours. Use wedge pillows to anchor patient    CONSULTS:      Physical Therapy to evaluate and  treat     Occupational Therapy to evaluate and treat     Speech Therapy  to evaluate and treat     Nutrition to evaluate and recommend diet     Psychiatry to evaluate and follow patients for delirium          Medications: Discontinue all previous medication orders, if any. See new list below.     Keira Starkey   Home Medication Instructions LUDWIN:[id number]    Printed on:04/10/20 5379   Medication Information                      acetaminophen (TYLENOL) 325 MG tablet  Take 2 tablets (650 mg total) by mouth every 6 (six) hours as needed.             ALPRAZolam (XANAX) 0.25 MG tablet  Take 1 tablet (0.25 mg total) by mouth 3 (three) times daily as needed for Anxiety.             ARIPiprazole (ABILIFY) 15 MG Tab  Take 15 mg by mouth nightly.              ascorbic acid, vitamin C, (VITAMIN C) 100 MG tablet  Take 500 mg by mouth once daily.              carvedilol (COREG) 6.25 MG tablet  Take 6.25 mg by mouth 2 (two) times daily with meals.             donepezil (ARICEPT) 10 MG tablet  Take 10 mg by mouth every evening.             escitalopram oxalate (LEXAPRO) 10 MG tablet  Take 15 mg by mouth once daily.             fluticasone (FLONASE) 50 mcg/actuation nasal spray  1 spray by Each Nare route once daily.             levoFLOXacin (LEVAQUIN) 500 MG tablet  Take 1 tablet (500 mg total) by mouth every 48 hours. for 3 doses             morphine (MS CONTIN) 15 MG 12 hr tablet  Take 1 tablet (15 mg total) by mouth 3 (three) times daily as needed (pain and respiratory distress).             multivitamin capsule  Take 1 capsule by mouth once daily.             protein supplement Liqd  Take by mouth.             ranitidine (ZANTAC) 150 MG tablet  Take 150 mg by mouth once daily.              traZODone (DESYREL) 50 MG tablet  Take 25 mg by mouth every evening.             vitamin D (VITAMIN D3) 1000 units Tab  Take 1,000 Units by mouth once daily.             zinc sulfate (ZINCATE) 220 (50) mg capsule  Take 220 mg by mouth  once daily.                       _________________________________  Kareem Marte MD  04/10/2020

## 2020-04-10 NOTE — PLAN OF CARE
Brief ID note:     Spoke with primary team and patient is in process of being discharged with palliative care. BCx +staph cohnii urealyticus (labeled as coming from femoral line) and repeat NGTD. Please check if patient has femoral line, and if she does, it needs to be removed. Can likely stop vanc on discharge if repeat cultures remain negative. Please call back with questions/concerns or if goals of care change    Didi Ramos MD  Infectious Disease

## 2020-04-10 NOTE — PLAN OF CARE
Problem: Wound  Goal: Optimal Wound Healing  Outcome: Ongoing, Progressing     Problem: Infection  Goal: Infection Symptom Resolution  Outcome: Ongoing, Progressing     Problem: Fall Injury Risk  Goal: Absence of Fall and Fall-Related Injury  Outcome: Ongoing, Progressing     Problem: Adult Inpatient Plan of Care  Goal: Plan of Care Review  Outcome: Ongoing, Progressing  Goal: Patient-Specific Goal (Individualization)  Outcome: Ongoing, Progressing  Goal: Absence of Hospital-Acquired Illness or Injury  Outcome: Ongoing, Progressing  Goal: Optimal Comfort and Wellbeing  Outcome: Ongoing, Progressing  Goal: Readiness for Transition of Care  Outcome: Ongoing, Progressing  Goal: Rounds/Family Conference  Outcome: Ongoing, Progressing     Problem: Skin Injury Risk Increased  Goal: Skin Health and Integrity  Outcome: Ongoing, Progressing     Problem: Coping Ineffective  Goal: Effective Coping  Outcome: Ongoing, Progressing     Problem: Electrolyte Imbalance (Acute Kidney Injury/Impairment)  Goal: Serum Electrolyte Balance  Outcome: Ongoing, Progressing     Problem: Fluid Imbalance (Acute Kidney Injury/Impairment)  Goal: Optimal Fluid Balance  Outcome: Ongoing, Progressing     Problem: Hematologic Alteration (Acute Kidney Injury/Impairment)  Goal: Hemoglobin, Hematocrit and Platelets Within Normal Range  Outcome: Ongoing, Progressing     Problem: Oral Intake Inadequate (Acute Kidney Injury/Impairment)  Goal: Optimal Nutrition Intake  Outcome: Ongoing, Progressing     Problem: Renal Function Impairment (Acute Kidney Injury/Impairment)  Goal: Effective Renal Function  Outcome: Ongoing, Progressing     Problem: ARDS (Acute Respiratory Distress Syndrome)  Goal: Effective Oxygenation  Outcome: Ongoing, Progressing

## 2020-04-10 NOTE — PLAN OF CARE
Will attempt inpt hospice at [facility] on Monday due to oxygen requirements.     04/10/20 1445   Post-Acute Status   Post-Acute Authorization Hospice   Hospice Status Awaiting Orders for Hospice   Discharge Delays (!) Home Medical Equipment (Insurance, Delivery)   Discharge Plan   Discharge Plan A Inpatient Hospice   Discharge Plan B Return to Nursing Home   .Ayala Pandey RN, BSN, STN CCM  4/10/2020

## 2020-04-10 NOTE — PROGRESS NOTES
ADT 30 order placed for Stretcher Transportation.  Requested  time: ON WILL CALL WITH O2  If transportation does not arrive at ETA time nurse will be instructed to follow protocol for transportation below:   How can I get in touch directly with dispatch, if needed?                 · Non-emergent (stretcher): [phone]    · Emergent dispatch: [phone]    ++NURSING:  If Stretcher does not arrive at requested time please call the above Non Emergent Dispatcher.  If issue not resolved please escalate to your charge nurse for further instructions.

## 2020-04-11 PROBLEM — Z75.8 DISCHARGE PLANNING ISSUES: Status: ACTIVE | Noted: 2020-04-11

## 2020-04-11 LAB
ALBUMIN SERPL BCP-MCNC: 1.8 G/DL (ref 3.5–5.2)
ALP SERPL-CCNC: 76 U/L (ref 55–135)
ALT SERPL W/O P-5'-P-CCNC: 24 U/L (ref 10–44)
ANION GAP SERPL CALC-SCNC: 10 MMOL/L (ref 8–16)
AST SERPL-CCNC: 29 U/L (ref 10–40)
BASOPHILS NFR BLD: 0 % (ref 0–1.9)
BILIRUB SERPL-MCNC: 0.2 MG/DL (ref 0.1–1)
BUN SERPL-MCNC: 30 MG/DL (ref 8–23)
CALCIUM SERPL-MCNC: 8.7 MG/DL (ref 8.7–10.5)
CHLORIDE SERPL-SCNC: 109 MMOL/L (ref 95–110)
CO2 SERPL-SCNC: 26 MMOL/L (ref 23–29)
CREAT SERPL-MCNC: 2.5 MG/DL (ref 0.5–1.4)
DIFFERENTIAL METHOD: ABNORMAL
EOSINOPHIL NFR BLD: 2 % (ref 0–8)
ERYTHROCYTE [DISTWIDTH] IN BLOOD BY AUTOMATED COUNT: 15.1 % (ref 11.5–14.5)
EST. GFR  (AFRICAN AMERICAN): 21 ML/MIN/1.73 M^2
EST. GFR  (NON AFRICAN AMERICAN): 18 ML/MIN/1.73 M^2
GLUCOSE SERPL-MCNC: 80 MG/DL (ref 70–110)
HCT VFR BLD AUTO: 38.3 % (ref 37–48.5)
HGB BLD-MCNC: 11.1 G/DL (ref 12–16)
IMM GRANULOCYTES # BLD AUTO: ABNORMAL K/UL (ref 0–0.04)
IMM GRANULOCYTES NFR BLD AUTO: ABNORMAL % (ref 0–0.5)
LYMPHOCYTES NFR BLD: 40 % (ref 18–48)
MCH RBC QN AUTO: 25.1 PG (ref 27–31)
MCHC RBC AUTO-ENTMCNC: 29 G/DL (ref 32–36)
MCV RBC AUTO: 87 FL (ref 82–98)
METAMYELOCYTES NFR BLD MANUAL: 5 %
MONOCYTES NFR BLD: 13 % (ref 4–15)
NEUTROPHILS NFR BLD: 28 % (ref 38–73)
NEUTS BAND NFR BLD MANUAL: 12 %
NRBC BLD-RTO: 0 /100 WBC
PLATELET # BLD AUTO: 166 K/UL (ref 150–350)
PLATELET BLD QL SMEAR: ABNORMAL
PMV BLD AUTO: 10.2 FL (ref 9.2–12.9)
POCT GLUCOSE: 80 MG/DL (ref 70–110)
POCT GLUCOSE: 85 MG/DL (ref 70–110)
POCT GLUCOSE: 87 MG/DL (ref 70–110)
POCT GLUCOSE: 99 MG/DL (ref 70–110)
POTASSIUM SERPL-SCNC: 4.1 MMOL/L (ref 3.5–5.1)
PROT SERPL-MCNC: 6.4 G/DL (ref 6–8.4)
RBC # BLD AUTO: 4.43 M/UL (ref 4–5.4)
SODIUM SERPL-SCNC: 145 MMOL/L (ref 136–145)
VANCOMYCIN SERPL-MCNC: 21.5 UG/ML
WBC # BLD AUTO: 6.45 K/UL (ref 3.9–12.7)

## 2020-04-11 PROCEDURE — 85007 BL SMEAR W/DIFF WBC COUNT: CPT

## 2020-04-11 PROCEDURE — 63600175 PHARM REV CODE 636 W HCPCS: Performed by: EMERGENCY MEDICINE

## 2020-04-11 PROCEDURE — 80202 ASSAY OF VANCOMYCIN: CPT

## 2020-04-11 PROCEDURE — 63600175 PHARM REV CODE 636 W HCPCS: Performed by: FAMILY MEDICINE

## 2020-04-11 PROCEDURE — 27100092 HC HIGH FLOW DELIVERY CANNULA

## 2020-04-11 PROCEDURE — 85027 COMPLETE CBC AUTOMATED: CPT

## 2020-04-11 PROCEDURE — 80053 COMPREHEN METABOLIC PANEL: CPT

## 2020-04-11 PROCEDURE — 27100171 HC OXYGEN HIGH FLOW UP TO 24 HOURS

## 2020-04-11 PROCEDURE — 94761 N-INVAS EAR/PLS OXIMETRY MLT: CPT

## 2020-04-11 PROCEDURE — 63600175 PHARM REV CODE 636 W HCPCS: Performed by: INTERNAL MEDICINE

## 2020-04-11 PROCEDURE — 25000003 PHARM REV CODE 250: Performed by: FAMILY MEDICINE

## 2020-04-11 PROCEDURE — 25000003 PHARM REV CODE 250: Performed by: INTERNAL MEDICINE

## 2020-04-11 PROCEDURE — 21400001 HC TELEMETRY ROOM

## 2020-04-11 PROCEDURE — C9113 INJ PANTOPRAZOLE SODIUM, VIA: HCPCS | Performed by: EMERGENCY MEDICINE

## 2020-04-11 RX ORDER — DEXTROSE MONOHYDRATE 50 MG/ML
INJECTION, SOLUTION INTRAVENOUS CONTINUOUS
Status: DISCONTINUED | OUTPATIENT
Start: 2020-04-11 | End: 2020-04-12

## 2020-04-11 RX ADMIN — MORPHINE SULFATE 15 MG: 15 TABLET, FILM COATED, EXTENDED RELEASE ORAL at 10:04

## 2020-04-11 RX ADMIN — SODIUM BICARBONATE: 84 INJECTION, SOLUTION INTRAVENOUS at 02:04

## 2020-04-11 RX ADMIN — HEPARIN SODIUM 5000 UNITS: 5000 INJECTION INTRAVENOUS; SUBCUTANEOUS at 05:04

## 2020-04-11 RX ADMIN — HEPARIN SODIUM 5000 UNITS: 5000 INJECTION INTRAVENOUS; SUBCUTANEOUS at 09:04

## 2020-04-11 RX ADMIN — CEFTRIAXONE 1 G: 1 INJECTION, POWDER, FOR SOLUTION INTRAMUSCULAR; INTRAVENOUS at 09:04

## 2020-04-11 RX ADMIN — HEPARIN SODIUM 5000 UNITS: 5000 INJECTION INTRAVENOUS; SUBCUTANEOUS at 03:04

## 2020-04-11 RX ADMIN — PANTOPRAZOLE SODIUM 40 MG: 40 INJECTION, POWDER, FOR SOLUTION INTRAVENOUS at 10:04

## 2020-04-11 RX ADMIN — DEXTROSE 1000 ML: 5 SOLUTION INTRAVENOUS at 05:04

## 2020-04-11 NOTE — PROGRESS NOTES
"[facility] Hospital Medicine  Progress Note    Patient Name: Keira Starkey  MRN: [id number]  Patient Class: IP- Inpatient   Admission Date: 4/6/2020  Length of Stay: 4 days  Attending Physician: Kareem Marte MD  Primary Care Provider: Primary Doctor No        Subjective:     Principal Problem:Acute respiratory distress syndrome (ARDS) due to COVID-19 virus        HPI:  Per Dr. Kareem Marte:    "Pt is a 76 yo M resident of a nursing home with a h/o CKD, Dementia, HTN, hemiplegia, GERD, OA who presents to the ER from nursing home for AMS and hypoxia.  Pt has been having  A decreased mental state x 2 days along with poor appetite. When EMS arrived they could not get a blood pressure per the ED physician and HR was noted to be as low is 20-30's beats per minute.  In the ED pt was also noted to be hypothermic and manual BP was ~100 systolic.  Pt was placd in ED and given atropine.  After rewarming pt's HR did come up to the 60's.  Pt was tested for COVID and was positive.  Pt was started on oxygen support and antibiotics."    Overview/Hospital Course:  Patient is a 75 year-old woman nursing home resident with hypertension, chronic kidney disease stage III, prior history of left heel osteomyelitis, osteoarthritis, dementia was sent to the emergency department for evaluation for new onset hypoxia and worsening mental status.  Patient tested positive for COVID-19 infection. Patient admitted to the hospital for treatment of infection with acute metabolic encephalopathy and renal failure.  Patient started on empiric antibiotic therapy and treated with supplemental oxygen. Patient has clinically declined despite medical therapy and supportive care with worsening renal failure. Nephrology service consulted and feels patient is not a good candidate for hemodialysis.   Palliative care service also consulted and had discussions with patient's family who agree to focus on comfort care and DNR ordered " placed.    Patient was discharged to nursing home to be DNR and focus on palliative are.      Interval History:  Patient more awake and alert this morning.  Awaitign nursing home acceptance.  Was accepted yesterday but then the NH said not till Monday.  Ordered placed to remove femoral central line.     Review of Systems   Unable to perform ROS: Mental status change     Objective:     Vital Signs (Most Recent):  Temp: 98.2 °F (36.8 °C) (04/11/20 0811)  Pulse: 72 (04/11/20 0811)  Resp: 16 (04/11/20 0811)  BP: (!) 99/55 (04/11/20 0811)  SpO2: 97 % (04/11/20 0811) Vital Signs (24h Range):  Temp:  [96.3 °F (35.7 °C)-99.2 °F (37.3 °C)] 98.2 °F (36.8 °C)  Pulse:  [56-74] 72  Resp:  [16-20] 16  SpO2:  [93 %-100 %] 97 %  BP: ()/(55-62) 99/55     Weight: 74.8 kg (164 lb 14.5 oz)  Body mass index is 32.21 kg/m².    Intake/Output Summary (Last 24 hours) at 4/11/2020 0921  Last data filed at 4/11/2020 0520  Gross per 24 hour   Intake 1050 ml   Output 600 ml   Net 450 ml      Physical Exam   Constitutional: No distress.   Ill-appearing.   HENT:   Head: Normocephalic and atraumatic.   Dry mucous membranes   Eyes: Pupils are equal, round, and reactive to light. EOM are normal.   Neck: No tracheal deviation present.   Contracture   Cardiovascular: Regular rhythm. Tachycardia present.   Pulmonary/Chest: She has rales.   Abdominal: Soft. Bowel sounds are normal.   Musculoskeletal: Normal range of motion. She exhibits no deformity.   Neurological: She displays abnormal reflex. She exhibits abnormal muscle tone.   Altered, confused, but more awake today and able to speak a few words.   Skin: Skin is warm. She is not diaphoretic. No pallor.   Vitals reviewed.      Significant Labs: All pertinent labs within the past 24 hours have been reviewed.    Significant Imaging: I have reviewed all pertinent imaging results/findings within the past 24 hours.      Assessment/Plan:      * Acute respiratory distress syndrome (ARDS) due to  COVID-19 virus  Patient tested positive for COVID-19.  Despite supportive care and supplemental oxygen to maintain reasonable oxygen saturation  Along with empiric antibiotic therapy to treat possible bacterial infection, patient has had progressive decline with increasing oxygen requirements and worsening renal failure.    Patient placed on non-rebreather mask which we have been able to wean to a Venti mask this afternoon.    Palliative care consulted and recommending transition to comfort focus care.  Family in agreement. Will continue with current level of care to see if she improves but otherwise do not resuscitate or intubate event of worsening respiratory failure or cardiac arrest.    Discharge planning issues  Discharged to NH on Friday 4/10.  NH accepted, but then later changed there mind to Monday.   working with nursing [facility].  Pt to to NH with comfort focused care/palliative care.  Needs palliative bed.  Avoid rehospitalization.      Fem line dc ordered      Acute metabolic encephalopathy  Likely due to infection and also renal failure. Mental status mildly improved today although overall her trajectory has been a negative.  Continue with antibiotic therapy.  Repeat blood cultures today.    Hemiplegia  Chronic, with left sided contractures.  Prior stroke?  Although I do not see clear evidence of this on CT head.    Dementia with behavioral disturbance  History of refusing medications.  Mental status worse likely due to acute metabolic encephalopathy from infection and worsening renal failure.    Acute renal failure superimposed on stage 3 chronic kidney disease  Mildly improved mental status however serum creatinine continues trend upward.  Discussed with nephrology feels patient is not a reasonable candidate for hemodialysis.  Will continue with gentle  intravenous fluids as she still appears clinically hypovolemic although we are trying to avoid hypervolemia and pulmonary edema due to  COVID-19 infection.    Essential hypertension  Normotensive.  Holding blood pressure medications.    VTE Risk Mitigation (From admission, onward)         Ordered     heparin (porcine) injection 5,000 Units  Every 8 hours      04/07/20 0532     IP VTE HIGH RISK PATIENT  Once      04/07/20 0530     Place sequential compression device  Until discontinued      04/07/20 0530                      Kareem Marte MD  Department of Hospital Medicine   [facility]

## 2020-04-11 NOTE — ASSESSMENT & PLAN NOTE
Discharged to NH on Friday 4/10.  NH accepted, but then later changed there mind to Monday.   working with nursing home.  Pt to to NH with comfort focused care/palliative care.  Needs palliative bed.  Avoid rehospitalization.      Fem line dc ordered

## 2020-04-11 NOTE — SUBJECTIVE & OBJECTIVE
Interval History:  Patient more awake and alert this morning.  Awaitign nursing home acceptance.  Was accepted yesterday but then the NH said not till Monday.  Ordered placed to remove femoral central line.     Review of Systems   Unable to perform ROS: Mental status change     Objective:     Vital Signs (Most Recent):  Temp: 98.2 °F (36.8 °C) (04/11/20 0811)  Pulse: 72 (04/11/20 0811)  Resp: 16 (04/11/20 0811)  BP: (!) 99/55 (04/11/20 0811)  SpO2: 97 % (04/11/20 0811) Vital Signs (24h Range):  Temp:  [96.3 °F (35.7 °C)-99.2 °F (37.3 °C)] 98.2 °F (36.8 °C)  Pulse:  [56-74] 72  Resp:  [16-20] 16  SpO2:  [93 %-100 %] 97 %  BP: ()/(55-62) 99/55     Weight: 74.8 kg (164 lb 14.5 oz)  Body mass index is 32.21 kg/m².    Intake/Output Summary (Last 24 hours) at 4/11/2020 0921  Last data filed at 4/11/2020 0520  Gross per 24 hour   Intake 1050 ml   Output 600 ml   Net 450 ml      Physical Exam   Constitutional: No distress.   Ill-appearing.   HENT:   Head: Normocephalic and atraumatic.   Dry mucous membranes   Eyes: Pupils are equal, round, and reactive to light. EOM are normal.   Neck: No tracheal deviation present.   Contracture   Cardiovascular: Regular rhythm. Tachycardia present.   Pulmonary/Chest: She has rales.   Abdominal: Soft. Bowel sounds are normal.   Musculoskeletal: Normal range of motion. She exhibits no deformity.   Neurological: She displays abnormal reflex. She exhibits abnormal muscle tone.   Altered, confused, but more awake today and able to speak a few words.   Skin: Skin is warm. She is not diaphoretic. No pallor.   Vitals reviewed.      Significant Labs: All pertinent labs within the past 24 hours have been reviewed.    Significant Imaging: I have reviewed all pertinent imaging results/findings within the past 24 hours.

## 2020-04-11 NOTE — PROGRESS NOTES
Pharmacokinetic Assessment Follow Up: IV Vancomycin    Vancomycin serum concentration assessment(s):    The random level was drawn correctly and can be used to guide therapy at this time. The measurement is above the desired definitive target range of 10 to 20 mcg/mL.    Vancomycin Regimen Plan:    No dose today.  Re-dose when the random level is less than 20 mcg/mL, next level to be drawn at 0400 on 4/12/2020     Drug levels (last 3 results):  Recent Labs   Lab Result Units 04/09/20  0603 04/10/20  0624 04/11/20  0409   Vancomycin, Random ug/mL 17.3 17.8 21.5       Pharmacy will continue to follow and monitor vancomycin.    Please contact pharmacy at extension [phone] for questions regarding this assessment.    Thank you for the consult,   Landon Stein Jr       Patient brief summary:  Keira Starkey is a 75 y.o. female initiated on antimicrobial therapy with IV Vancomycin for treatment of bacteremia    Drug Allergies:   Review of patient's allergies indicates:  No Known Allergies    Actual Body Weight:   74.8 kg    Renal Function:   Estimated Creatinine Clearance: 16 mL/min (A) (based on SCr of 2.7 mg/dL (H)).,     Dialysis Method (if applicable):  N/A    CBC (last 72 hours):  Recent Labs   Lab Result Units 04/09/20  0603 04/10/20  0625 04/11/20  0409   WBC K/uL 11.79 8.65 6.45   Hemoglobin g/dL 12.3 12.0 11.1*   Hematocrit % 41.2 40.7 38.3   Platelets K/uL 204 182 166   Gran% % 56.0 55.0  --    Lymph% % 22.0 19.0  --    Mono% % 13.0 7.0  --    Eosinophil% % 0.0 2.0  --    Basophil% % 0.0 0.0  --    Differential Method  Manual Manual  --        Metabolic Panel (last 72 hours):  Recent Labs   Lab Result Units 04/09/20  0602 04/09/20  0603 04/10/20  0625   Sodium mmol/L  --  148* 147*   Potassium mmol/L  --  4.3 4.2   Chloride mmol/L  --  118* 113*   CO2 mmol/L  --  22* 23   Glucose mg/dL  --  87 81   BUN, Bld mg/dL  --  43* 35*   Creatinine mg/dL  --  3.1* 2.7*   Albumin g/dL  --  2.1* 2.0*   Total Bilirubin  mg/dL  --  0.3 0.3   Alkaline Phosphatase U/L  --  82 92   AST U/L  --  42* 35   ALT U/L  --  31 31   Phosphorus mg/dL 3.3  --   --        Vancomycin Administrations:  vancomycin given in the last 96 hours                     vancomycin 500 mg in dextrose 5 % 100 mL IVPB (ready to mix system) (mg) 500 mg New Bag 04/10/20 1322    vancomycin 500 mg in dextrose 5 % 100 mL IVPB (ready to mix system) (mg) 500 mg New Bag 04/09/20 0952                      Microbiologic Results:  Microbiology Results (last 7 days)       Procedure Component Value Units Date/Time    Blood culture x two cultures. Draw prior to antibiotics. [[id number]] Collected:  04/06/20 1545    Order Status:  Completed Specimen:  Blood from Peripheral, Forearm, Left Updated:  04/10/20 2303     Blood Culture, Routine No Growth after 4 days.     Narrative:       Aerobic and anaerobic    Blood culture [[id number]] Collected:  04/09/20 1151    Order Status:  Completed Specimen:  Blood Updated:  04/10/20 1503     Blood Culture, Routine No Growth to date      No Growth to date    Blood culture [[id number]] Collected:  04/09/20 1150    Order Status:  Completed Specimen:  Blood Updated:  04/10/20 1503     Blood Culture, Routine No Growth to date      No Growth to date    Blood culture x two cultures. Draw prior to antibiotics. [[id number]]  (Abnormal) Collected:  04/06/20 1728    Order Status:  Completed Specimen:  Blood from Line, Femoral, Left Updated:  04/09/20 1312     Blood Culture, Routine Gram stain aer bottle: Gram positive cocci in clusters resembling Staph       Results called to and read back by: Luz Elena Flores      Gram stain balwinder bottle: Gram positive cocci in clusters resembling Staph       Positive results previously called      STAPHYLOCOCCUS COHNII SUBSPECIES UREALYTICUS  Organism is a probable contaminant      Narrative:       Aerobic and anaerobic

## 2020-04-11 NOTE — PROGRESS NOTES
Date of Admission:4/6/2020    SUBJECTIVE: notes some jerking per rn, not eating    Current Facility-Administered Medications   Medication    acetaminophen tablet 650 mg    cefTRIAXone (ROCEPHIN) 1 g in dextrose 5 % 50 mL IVPB    dextrose 5 % infusion    heparin (porcine) injection 5,000 Units    hydrALAZINE injection 10 mg    morphine 12 hr tablet 15 mg    ondansetron injection 4 mg    pantoprazole injection 40 mg    sodium chloride 0.9% flush 10 mL    vancomycin - pharmacy to dose       Wt Readings from Last 3 Encounters:   04/08/20 74.8 kg (164 lb 14.5 oz)   07/02/19 74.8 kg (165 lb)   04/10/19 75.1 kg (165 lb 9.6 oz)     Temp Readings from Last 3 Encounters:   04/11/20 98.2 °F (36.8 °C) (Axillary)   06/20/19 97.8 °F (36.6 °C) (Oral)   04/16/19 98 °F (36.7 °C) (Oral)     BP Readings from Last 3 Encounters:   04/11/20 (!) 97/51   06/20/19 131/81   04/16/19 124/63     Pulse Readings from Last 3 Encounters:   04/11/20 93   06/20/19 76   04/16/19 (!) 54       Intake/Output Summary (Last 24 hours) at 4/11/2020 1624  Last data filed at 4/11/2020 1459  Gross per 24 hour   Intake 1400 ml   Output 600 ml   Net 800 ml       PE:  No edema per rn      Recent Labs   Lab 04/11/20  0409   GLU 80      K 4.1      CO2 26   BUN 30*   CREATININE 2.5*   CALCIUM 8.7       Lab Results   Component Value Date    CALCIUM 8.7 04/11/2020    PHOS 3.3 04/09/2020       Recent Labs   Lab 04/11/20  0409   WBC 6.45   RBC 4.43   HGB 11.1*   HCT 38.3      MCV 87   MCH 25.1*   MCHC 29.0*         A/P:  rebeca on ckd 3. Creatinine is down some. Leave fluids on some with sugar marginal.  covid 19 infection with pna. Cont tx per ID.  Hypernatremia.better. Following.  Metabolic acidosis.better with tx. Dc bicarb gtt.  Dementia. About same per rn.  Anemia. Stable. No prbc indicated.  Proteinuria. No arb for now.  Hypoglycemia. Sugar low normal. Cont d5w.  Bacteremia. tx per id. Following cx- ngtd. Can we stop tx?  Pt going to  comfort care. Would dc labs and ivfs if truly so.  Will sign off. Please reconsult as needed. Thanks.  Due to the pt's covid 19 infection state, a bedside exam was not performed. Pertinent exam findings were discussed with the pt's nurse who examined the pt with appropriate ppe. Objective data and notes were reviewed by me.

## 2020-04-12 LAB
POCT GLUCOSE: 67 MG/DL (ref 70–110)
POCT GLUCOSE: 87 MG/DL (ref 70–110)
VANCOMYCIN SERPL-MCNC: 15.7 UG/ML

## 2020-04-12 PROCEDURE — 21400001 HC TELEMETRY ROOM

## 2020-04-12 PROCEDURE — C9113 INJ PANTOPRAZOLE SODIUM, VIA: HCPCS | Performed by: EMERGENCY MEDICINE

## 2020-04-12 PROCEDURE — 63600175 PHARM REV CODE 636 W HCPCS: Performed by: EMERGENCY MEDICINE

## 2020-04-12 PROCEDURE — 94761 N-INVAS EAR/PLS OXIMETRY MLT: CPT

## 2020-04-12 PROCEDURE — 25000003 PHARM REV CODE 250: Performed by: FAMILY MEDICINE

## 2020-04-12 PROCEDURE — 63600175 PHARM REV CODE 636 W HCPCS: Performed by: FAMILY MEDICINE

## 2020-04-12 PROCEDURE — 36415 COLL VENOUS BLD VENIPUNCTURE: CPT

## 2020-04-12 PROCEDURE — 80202 ASSAY OF VANCOMYCIN: CPT

## 2020-04-12 RX ORDER — DEXTROSE MONOHYDRATE 100 MG/ML
INJECTION, SOLUTION INTRAVENOUS CONTINUOUS
Status: DISCONTINUED | OUTPATIENT
Start: 2020-04-12 | End: 2020-04-14 | Stop reason: HOSPADM

## 2020-04-12 RX ADMIN — HEPARIN SODIUM 5000 UNITS: 5000 INJECTION INTRAVENOUS; SUBCUTANEOUS at 01:04

## 2020-04-12 RX ADMIN — PANTOPRAZOLE SODIUM 40 MG: 40 INJECTION, POWDER, FOR SOLUTION INTRAVENOUS at 09:04

## 2020-04-12 RX ADMIN — HEPARIN SODIUM 5000 UNITS: 5000 INJECTION INTRAVENOUS; SUBCUTANEOUS at 10:04

## 2020-04-12 RX ADMIN — CEFTRIAXONE 1 G: 1 INJECTION, POWDER, FOR SOLUTION INTRAMUSCULAR; INTRAVENOUS at 08:04

## 2020-04-12 RX ADMIN — DEXTROSE: 10 SOLUTION INTRAVENOUS at 09:04

## 2020-04-12 RX ADMIN — HEPARIN SODIUM 5000 UNITS: 5000 INJECTION INTRAVENOUS; SUBCUTANEOUS at 05:04

## 2020-04-12 RX ADMIN — VANCOMYCIN HYDROCHLORIDE 500 MG: 500 INJECTION, POWDER, LYOPHILIZED, FOR SOLUTION INTRAVENOUS at 02:04

## 2020-04-12 NOTE — PROGRESS NOTES
Pt placed on Nasal Canula @ 5 liters from 28% Ventimask. Sat's are 97% on NC. No signs of resp distress.

## 2020-04-12 NOTE — PLAN OF CARE
Patient nonverbal responds to touch. O2 sats97% on oxygen. Bear hugger in use with lowest setting maintaining temp of 98 degrees. Productive cough with thick yellow phlegm. Oral care provided. Dressing to left heel changed. Daughter lives out of town stated she wants to speak with the doctor after morning rounds. Her phone number is [phone].

## 2020-04-12 NOTE — SUBJECTIVE & OBJECTIVE
Interval History:  Patient stable, in NAD.  No acute events overnight.  Awaiting NH palliative care bed tomorrow.   aware.    Review of Systems   Unable to perform ROS: Mental status change     Objective:     Vital Signs (Most Recent):  Temp: 97.6 °F (36.4 °C) (04/12/20 1214)  Pulse: 91 (04/12/20 1214)  Resp: 18 (04/12/20 1214)  BP: (!) 110/55 (04/12/20 1214)  SpO2: 95 % (04/12/20 1214) Vital Signs (24h Range):  Temp:  [96.9 °F (36.1 °C)-98.3 °F (36.8 °C)] 97.6 °F (36.4 °C)  Pulse:  [78-93] 91  Resp:  [16-22] 18  SpO2:  [93 %-98 %] 95 %  BP: ()/(51-75) 110/55     Weight: 74.8 kg (164 lb 14.5 oz)  Body mass index is 32.21 kg/m².    Intake/Output Summary (Last 24 hours) at 4/12/2020 1405  Last data filed at 4/12/2020 0600  Gross per 24 hour   Intake 650 ml   Output 1450 ml   Net -800 ml      Physical Exam   Constitutional: No distress.   Ill-appearing.   HENT:   Head: Normocephalic and atraumatic.   Dry mucous membranes   Eyes: Pupils are equal, round, and reactive to light. EOM are normal.   Neck: No tracheal deviation present.   Contracture   Cardiovascular: Regular rhythm. Tachycardia present.   Pulmonary/Chest: She has rales.   Abdominal: Soft. Bowel sounds are normal.   Musculoskeletal: Normal range of motion. She exhibits no deformity.   Neurological: She displays abnormal reflex. She exhibits abnormal muscle tone.   Altered, confused, but more awake today and able to speak a few words.   Skin: Skin is warm. She is not diaphoretic. No pallor.   Vitals reviewed.      Significant Labs: All pertinent labs within the past 24 hours have been reviewed.    Significant Imaging: I have reviewed all pertinent imaging results/findings within the past 24 hours.

## 2020-04-12 NOTE — ASSESSMENT & PLAN NOTE
Discussed with nephrology feels patient is not a reasonable candidate for hemodialysis.  Patient given gentle IVF.  Comfort focused measures

## 2020-04-12 NOTE — PROGRESS NOTES
"[facility] Hospital Medicine  Progress Note    Patient Name: Keira Starkey  MRN: [id number]  Patient Class: IP- Inpatient   Admission Date: 4/6/2020  Length of Stay: 5 days  Attending Physician: Kareem Marte MD  Primary Care Provider: Primary Doctor No        Subjective:     Principal Problem:Acute respiratory distress syndrome (ARDS) due to COVID-19 virus        HPI:  Per Dr. Kareem Marte:    "Pt is a 74 yo M resident of a nursing home with a h/o CKD, Dementia, HTN, hemiplegia, GERD, OA who presents to the ER from nursing home for AMS and hypoxia.  Pt has been having  A decreased mental state x 2 days along with poor appetite. When EMS arrived they could not get a blood pressure per the ED physician and HR was noted to be as low is 20-30's beats per minute.  In the ED pt was also noted to be hypothermic and manual BP was ~100 systolic.  Pt was placd in ED and given atropine.  After rewarming pt's HR did come up to the 60's.  Pt was tested for COVID and was positive.  Pt was started on oxygen support and antibiotics."    Overview/Hospital Course:  Patient is a 75 year-old woman nursing home resident with hypertension, chronic kidney disease stage III, prior history of left heel osteomyelitis, osteoarthritis, dementia was sent to the emergency department for evaluation for new onset hypoxia and worsening mental status.  Patient tested positive for COVID-19 infection. Patient admitted to the hospital for treatment of infection with acute metabolic encephalopathy and renal failure.  Patient started on empiric antibiotic therapy and treated with supplemental oxygen. Patient has clinically declined despite medical therapy and supportive care with worsening renal failure. Nephrology service consulted and feels patient is not a good candidate for hemodialysis.   Palliative care service also consulted and had discussions with patient's family who agree to focus on comfort care and DNR ordered " placed.    Patient was discharged to nursing home to be DNR and focus on palliative are.      Interval History:  Patient stable, in NAD.  No acute events overnight.  Awaiting NH palliative care bed tomorrow.   aware.    Review of Systems   Unable to perform ROS: Mental status change     Objective:     Vital Signs (Most Recent):  Temp: 97.6 °F (36.4 °C) (04/12/20 1214)  Pulse: 91 (04/12/20 1214)  Resp: 18 (04/12/20 1214)  BP: (!) 110/55 (04/12/20 1214)  SpO2: 95 % (04/12/20 1214) Vital Signs (24h Range):  Temp:  [96.9 °F (36.1 °C)-98.3 °F (36.8 °C)] 97.6 °F (36.4 °C)  Pulse:  [78-93] 91  Resp:  [16-22] 18  SpO2:  [93 %-98 %] 95 %  BP: ()/(51-75) 110/55     Weight: 74.8 kg (164 lb 14.5 oz)  Body mass index is 32.21 kg/m².    Intake/Output Summary (Last 24 hours) at 4/12/2020 1405  Last data filed at 4/12/2020 0600  Gross per 24 hour   Intake 650 ml   Output 1450 ml   Net -800 ml      Physical Exam   Constitutional: No distress.   Ill-appearing.   HENT:   Head: Normocephalic and atraumatic.   Dry mucous membranes   Eyes: Pupils are equal, round, and reactive to light. EOM are normal.   Neck: No tracheal deviation present.   Contracture   Cardiovascular: Regular rhythm. Tachycardia present.   Pulmonary/Chest: She has rales.   Abdominal: Soft. Bowel sounds are normal.   Musculoskeletal: Normal range of motion. She exhibits no deformity.   Neurological: She displays abnormal reflex. She exhibits abnormal muscle tone.   Altered, confused, but more awake today and able to speak a few words.   Skin: Skin is warm. She is not diaphoretic. No pallor.   Vitals reviewed.      Significant Labs: All pertinent labs within the past 24 hours have been reviewed.    Significant Imaging: I have reviewed all pertinent imaging results/findings within the past 24 hours.      Assessment/Plan:      * Acute respiratory distress syndrome (ARDS) due to COVID-19 virus  Patient tested positive for COVID-19.  Despite supportive  care and supplemental oxygen to maintain reasonable oxygen saturation  Along with empiric antibiotic therapy to treat possible bacterial infection, patient has had progressive decline with increasing oxygen requirements and worsening renal failure.    Patient placed on non-rebreather mask which we have been able to wean to a Venti mask this afternoon.    Palliative care consulted and recommending transition to comfort focus care.  Family in agreement. Will continue with current level of care to see if she improves but otherwise do not resuscitate or intubate event of worsening respiratory failure or cardiac arrest.    Discharge planning issues  Discharged to NH on Friday 4/10.  NH accepted, but then later changed there mind to Monday.   working with nursing home.  Pt to to NH with comfort focused care/palliative care.  Needs palliative bed.  Avoid rehospitalization.      Fem line dc ordered      Acute metabolic encephalopathy  Likely due to infection and also renal failure. Mental status mildly improved. monitor    Hemiplegia  Chronic, with left sided contractures.  Prior stroke?  Although I do not see clear evidence of this on CT head.    Dementia with behavioral disturbance  History of refusing medications.  Mental status worse likely due to acute metabolic encephalopathy from infection and worsening renal failure.    Acute renal failure superimposed on stage 3 chronic kidney disease   Discussed with nephrology feels patient is not a reasonable candidate for hemodialysis.  Patient given gentle IVF.  Comfort focused measures    Essential hypertension  Normotensive.  Holding blood pressure medications.    VTE Risk Mitigation (From admission, onward)         Ordered     heparin (porcine) injection 5,000 Units  Every 8 hours      04/07/20 0532     IP VTE HIGH RISK PATIENT  Once      04/07/20 0530     Place sequential compression device  Until discontinued      04/07/20 0530                      Kareem  MD Estuardo  Department of Hospital Medicine   [facility]

## 2020-04-12 NOTE — PROGRESS NOTES
Pharmacokinetic Assessment Follow Up: IV Vancomycin    Vancomycin serum concentration assessment(s):    The random level was drawn correctly and can be used to guide therapy at this time. The measurement is within the desired definitive target range of 10 to 20 mcg/mL.    Vancomycin Regimen Plan:    Dose 500 mg x 1 today 4/13    Re-dose when the random level is less than 20 mcg/mL, next level to be drawn at 0600 on 4/13     Drug levels (last 3 results):  Recent Labs   Lab Result Units 04/10/20  0624 04/11/20  0409 04/12/20  0710   Vancomycin, Random ug/mL 17.8 21.5 15.7       Pharmacy will continue to follow and monitor vancomycin.    Please contact pharmacy at extension [phone] for questions regarding this assessment.    Thank you for the consult,   Oliver Gómez       Patient brief summary:  Keira Starkey is a 75 y.o. female initiated on antimicrobial therapy with IV Vancomycin for treatment of bacteremia      Drug Allergies:   Review of patient's allergies indicates:  No Known Allergies    Actual Body Weight:   74.8 kg    Renal Function:   Estimated Creatinine Clearance: 17.3 mL/min (A) (based on SCr of 2.5 mg/dL (H)).,     Dialysis Method (if applicable):  N/A    CBC (last 72 hours):  Recent Labs   Lab Result Units 04/10/20  0625 04/11/20  0409   WBC K/uL 8.65 6.45   Hemoglobin g/dL 12.0 11.1*   Hematocrit % 40.7 38.3   Platelets K/uL 182 166   Gran% % 55.0 28.0*   Lymph% % 19.0 40.0   Mono% % 7.0 13.0   Eosinophil% % 2.0 2.0   Basophil% % 0.0 0.0   Differential Method  Manual Manual       Metabolic Panel (last 72 hours):  Recent Labs   Lab Result Units 04/10/20  0625 04/11/20  0409   Sodium mmol/L 147* 145   Potassium mmol/L 4.2 4.1   Chloride mmol/L 113* 109   CO2 mmol/L 23 26   Glucose mg/dL 81 80   BUN, Bld mg/dL 35* 30*   Creatinine mg/dL 2.7* 2.5*   Albumin g/dL 2.0* 1.8*   Total Bilirubin mg/dL 0.3 0.2   Alkaline Phosphatase U/L 92 76   AST U/L 35 29   ALT U/L 31 24       Vancomycin  Administrations:  vancomycin given in the last 96 hours                     vancomycin 500 mg in dextrose 5 % 100 mL IVPB (ready to mix system) (mg) 500 mg New Bag 04/10/20 1322                      Microbiologic Results:  Microbiology Results (last 7 days)       Procedure Component Value Units Date/Time    Blood culture [[id number]] Collected:  04/09/20 1151    Order Status:  Completed Specimen:  Blood Updated:  04/11/20 1503     Blood Culture, Routine No Growth to date      No Growth to date      No Growth to date    Blood culture [[id number]] Collected:  04/09/20 1150    Order Status:  Completed Specimen:  Blood Updated:  04/11/20 1503     Blood Culture, Routine No Growth to date      No Growth to date      No Growth to date    Blood culture x two cultures. Draw prior to antibiotics. [[id number]] Collected:  04/06/20 1545    Order Status:  Completed Specimen:  Blood from Peripheral, Forearm, Left Updated:  04/10/20 2303     Blood Culture, Routine No Growth after 4 days.     Narrative:       Aerobic and anaerobic    Blood culture x two cultures. Draw prior to antibiotics. [[id number]]  (Abnormal) Collected:  04/06/20 1728    Order Status:  Completed Specimen:  Blood from Line, Femoral, Left Updated:  04/09/20 1312     Blood Culture, Routine Gram stain aer bottle: Gram positive cocci in clusters resembling Staph       Results called to and read back by: Luz Elena Flores      Gram stain balwinder bottle: Gram positive cocci in clusters resembling Staph       Positive results previously called      STAPHYLOCOCCUS COHNII SUBSPECIES UREALYTICUS  Organism is a probable contaminant      Narrative:       Aerobic and anaerobic

## 2020-04-13 VITALS
SYSTOLIC BLOOD PRESSURE: 117 MMHG | DIASTOLIC BLOOD PRESSURE: 68 MMHG | HEIGHT: 60 IN | RESPIRATION RATE: 18 BRPM | BODY MASS INDEX: 32.37 KG/M2 | OXYGEN SATURATION: 97 % | TEMPERATURE: 96 F | WEIGHT: 164.88 LBS | HEART RATE: 75 BPM

## 2020-04-13 LAB
BACTERIA BLD CULT: NORMAL
BACTERIA BLD CULT: NORMAL
POCT GLUCOSE: 77 MG/DL (ref 70–110)
POCT GLUCOSE: 82 MG/DL (ref 70–110)
POCT GLUCOSE: 98 MG/DL (ref 70–110)
VANCOMYCIN SERPL-MCNC: 19.2 UG/ML

## 2020-04-13 PROCEDURE — 80202 ASSAY OF VANCOMYCIN: CPT

## 2020-04-13 PROCEDURE — 36415 COLL VENOUS BLD VENIPUNCTURE: CPT

## 2020-04-13 PROCEDURE — 21400001 HC TELEMETRY ROOM

## 2020-04-13 PROCEDURE — C9113 INJ PANTOPRAZOLE SODIUM, VIA: HCPCS | Performed by: EMERGENCY MEDICINE

## 2020-04-13 PROCEDURE — 63600175 PHARM REV CODE 636 W HCPCS: Performed by: FAMILY MEDICINE

## 2020-04-13 PROCEDURE — 99900035 HC TECH TIME PER 15 MIN (STAT)

## 2020-04-13 PROCEDURE — 63600175 PHARM REV CODE 636 W HCPCS: Performed by: EMERGENCY MEDICINE

## 2020-04-13 PROCEDURE — 94761 N-INVAS EAR/PLS OXIMETRY MLT: CPT

## 2020-04-13 RX ADMIN — PANTOPRAZOLE SODIUM 40 MG: 40 INJECTION, POWDER, FOR SOLUTION INTRAVENOUS at 09:04

## 2020-04-13 RX ADMIN — HEPARIN SODIUM 5000 UNITS: 5000 INJECTION INTRAVENOUS; SUBCUTANEOUS at 05:04

## 2020-04-13 NOTE — PLAN OF CARE
04/13/20 0857   Vital Signs   SpO2 97 %   Pulse Oximetry Type Continuous   Flow (L/min) 2   O2 Device (Oxygen Therapy) nasal cannula     From flow sheet..Ayala Pandey RN, BSN, STN [facility]  4/13/2020

## 2020-04-13 NOTE — PROGRESS NOTES
TN informed Amrita of call report info [phone], [facility]. Room [address]..Ayala Pandey, RN, BSN, STN [facility]  4/13/2020  '

## 2020-04-13 NOTE — PROGRESS NOTES
ADT 30 order placed for Stretcher Transportation.  Requested  time: MIKE 3:00PM STRETCHER WITH OXYGEN, TO [facility]  If transportation does not arrive at ETA time nurse will be instructed to follow protocol for transportation below:   How can I get in touch directly with dispatch, if needed?                 · Non-emergent (stretcher): [phone]    · Emergent dispatch: [phone]    ++NURSING:  If Stretcher does not arrive at requested time please call the above Non Emergent Dispatcher.  If issue not resolved please escalate to your charge nurse for further instructions..Ayala Pandey, RN, BSN, STN [facility]  4/13/2020

## 2020-04-13 NOTE — PLAN OF CARE
04/13/20 1405   Final Note   Assessment Type Final Discharge Note   What phone number can be called within the next 1-3 days to see how you are doing after discharge?   ([phone])   Right Care Referral Info   Referral Type [facility]   Facility Name [facility]   Post-Acute Status   Post-Acute Authorization Placement   Post-Acute Placement Status Set-up Complete   Hospice Status Patient/Family Changed Mind   Discharge Delays None known at this time   .Ayala Pandey RN, BSN, STN [facility]  4/13/2020

## 2020-04-13 NOTE — PLAN OF CARE
04/13/20 1107   Post-Acute Status   Post-Acute Authorization Other  (TN awaiting call back from Corine at [facility].  Patient to go palliative Care at NH since sats are 2liter/nc at 97%)   Hospice Status Pending Clinical Review   Discharge Delays (!) Other   Discharge Plan   Discharge Plan A Return to nursing home   Discharge Plan B Return to Nursing Home   .Ayala Pandey, RN, BSN, STN [facility]  4/13/2020

## 2020-04-13 NOTE — PROGRESS NOTES
10:57AM TN spoke with Amrita regarding pt discharging back to [facility] with palliative care. Oxygen sate 2lnc sats 97%...Ayala Pandey RN, BSN, STN [facility]  4/13/2020    1:24PM.Call report information. Call [phone], [facility]. Patient to go to room 207A. Todd transport via stretcher..Ayala Pandey RN, BSN, [facility]  4/13/2020

## 2020-04-13 NOTE — PROGRESS NOTES
Renal Progress Note    Date of Admission:  4/6/2020  3:16 PM    Length of Stay: 6  Days    Subjective: n/a    Objective:    Current Facility-Administered Medications   Medication    acetaminophen tablet 650 mg    cefTRIAXone (ROCEPHIN) 1 g in dextrose 5 % 50 mL IVPB    dextrose 10 % infusion    heparin (porcine) injection 5,000 Units    hydrALAZINE injection 10 mg    morphine 12 hr tablet 15 mg    ondansetron injection 4 mg    pantoprazole injection 40 mg    sodium chloride 0.9% flush 10 mL    vancomycin - pharmacy to dose       Vitals:    04/13/20 0449 04/13/20 0738 04/13/20 0857 04/13/20 1135   BP: (!) 128/57 132/86  130/65   BP Location: Right arm Left arm  Left arm   Patient Position: Lying Lying  Lying   Pulse: 79 80  73   Resp: 18 18  18   Temp: 97.7 °F (36.5 °C) 96.4 °F (35.8 °C)  96.3 °F (35.7 °C)   TempSrc: Oral Axillary  Oral   SpO2: 96% 95% 97%    Weight:       Height:           I/O last 3 completed shifts:  In: 342.5 [I.V.:242.5; IV Piggyback:100]  Out: 1850 [Urine:1850]  No intake/output data recorded.      Physical Exam: Deferred / COVID-19 isolation protocol in progress      Laboratories:    No results for input(s): WBC, RBC, HGB, HCT, PLT, MCV, MCH, MCHC in the last 24 hours.    No results for input(s): GLUCOSE, CALCIUM, PROT, NA, K, CO2, CL, BUN, CREATININE, ALKPHOS, ALT, AST, BILITOT in the last 24 hours.    Invalid input(s):  ALBUMIN    No results for input(s): COLORU, CLARITYU, SPECGRAV, PHUR, PROTEINUA, GLUCOSEU, BLOODU, WBCU, RBCU, BACTERIA, MUCUS in the last 24 hours.    Invalid input(s):  BILIRUBINCON    Microbiology Results (last 7 days)     Procedure Component Value Units Date/Time    Blood culture [[id number]] Collected:  04/09/20 1151    Order Status:  Completed Specimen:  Blood Updated:  04/12/20 1503     Blood Culture, Routine No Growth to date      No Growth to date      No Growth to date      No Growth to date    Blood culture [[id number]] Collected:   04/09/20 1150    Order Status:  Completed Specimen:  Blood Updated:  04/12/20 1503     Blood Culture, Routine No Growth to date      No Growth to date      No Growth to date      No Growth to date    Blood culture x two cultures. Draw prior to antibiotics. [[id number]] Collected:  04/06/20 1545    Order Status:  Completed Specimen:  Blood from Peripheral, Forearm, Left Updated:  04/10/20 2303     Blood Culture, Routine No Growth after 4 days.     Narrative:       Aerobic and anaerobic    Blood culture x two cultures. Draw prior to antibiotics. [[id number]]  (Abnormal) Collected:  04/06/20 1728    Order Status:  Completed Specimen:  Blood from Line, Femoral, Left Updated:  04/09/20 1312     Blood Culture, Routine Gram stain aer bottle: Gram positive cocci in clusters resembling Staph       Results called to and read back by: Luz Elena Flores      Gram stain balwinder bottle: Gram positive cocci in clusters resembling Staph       Positive results previously called      STAPHYLOCOCCUS COHNII SUBSPECIES UREALYTICUS  Organism is a probable contaminant      Narrative:       Aerobic and anaerobic            Diagnostic Tests: n/a        Assessment:    - COVID-19 infection s/p PNA  - Bacteremia  - QUIRINO  - Metabolic acidosis  - Electrolyte imbalance resolved   - Anemia stable  - Proteinuria  - Hypoalbuminemia - protein malnutrition likely  - Hypoglycemia  - Dementia  - DNR        Plan:    - Transfer to NH palliative care in [facility]  - From Renal stand-point nothing else to offer      Will sign off the case

## 2020-04-14 ENCOUNTER — OUTPATIENT CASE MANAGEMENT (OUTPATIENT)
Dept: ADMINISTRATIVE | Facility: OTHER | Age: 75
End: 2020-04-14

## 2020-04-14 DIAGNOSIS — U07.1 COVID-19 VIRUS DETECTED: ICD-10-CM

## 2020-04-14 NOTE — PROGRESS NOTES
Outpatient Care Management  COVID-19 Patient Assessment    Patient: Keira Starkey  MRN: [id number]  Date of Service: 04/14/2020  Completed by: Lore Vasquez RN  Program: COVID-19    Reason for Visit   Patient presents with    OPCM Chart Review     4/14/20    COVID-19 Concerns     4/14/20    Case Closure     4/14/20-pt return to [facility]       Brief Summary: Pt dc back to [facility]. Closing case.    Assessment Documentation     COVID-19 Assessment    Nurse Assessment via Chart Review:  Nurse Assessment with Patient:  For Fever:  For Coughing:  For Difficulty Breathing:  Psycho/Social Assessment:         Problem List and History     Patient Active Problem List   Diagnosis    Open wound of left heel    Essential hypertension    Acute renal failure superimposed on stage 3 chronic kidney disease    Anemia of chronic disease    Dementia with behavioral disturbance    Depression    Chronic heel ulcer, left, with necrosis of muscle    Acute hematogenous osteomyelitis of left foot    Chronic heel ulcer, left, with necrosis of bone    Acute respiratory distress syndrome (ARDS) due to COVID-19 virus    Hemiplegia    Epilepsy    Schizophrenia    Acute metabolic encephalopathy    Palliative care encounter    Discharge planning issues       Reviewed Active Problem List with patient and/or Caregiver. The following were identified as areas of need:     Medical History:  Reviewed medical history with patient and/or caregiver    Social History:  Reviewed social history with patient and/or caregiver    Complex Care Plan    Care plan was discussed and completed today with input from patient and/or caregiver.    Patient Instructions     Instructions were provided via the [employer] patient resources and are available for the patient to view on the patient portal, if active.      No follow-ups on file.    Todays OPCM Self-Management Care Plan was developed with the patients/caregivers input and was based on  identified barriers from todays assessment.  Goals were written today with the patient/caregiver and the patient has agreed to work towards these goals to improve his/her overall well-being. Patient verbalized understanding of the care plan, goals, and all of today's instructions. Encouraged patient/caregiver to communicate with his/her physician and health care team about health conditions and the treatment plan.  Provided my contact information today and encouraged patient/caregiver to call me with any questions as needed.

## 2020-04-21 LAB — PATH REV BLD -IMP: NORMAL

## 2020-04-23 ENCOUNTER — TELEPHONE (OUTPATIENT)
Dept: ADMINISTRATIVE | Facility: CLINIC | Age: 75
End: 2020-04-23

## 2020-05-06 ENCOUNTER — TELEPHONE (OUTPATIENT)
Dept: ADMINISTRATIVE | Facility: HOSPITAL | Age: 75
End: 2020-05-06

## 2020-08-31 NOTE — PROGRESS NOTES
Pharmacokinetic Assessment Follow Up: IV Vancomycin    Vancomycin serum concentration assessment(s):    The random level was drawn correctly and can be used to guide therapy at this time. The measurement is within the desired definitive target range of 10 to 20 mcg/mL.    Vancomycin Regimen Plan:    No dose today.  Re-dose when the random level is less than 20 mcg/mL, next level to be drawn at 0400 on 4/14/2020     Drug levels (last 3 results):  Recent Labs   Lab Result Units 04/11/20  0409 04/12/20  0710 04/13/20  0457   Vancomycin, Random ug/mL 21.5 15.7 19.2       Pharmacy will continue to follow and monitor vancomycin.    Please contact pharmacy at extension [phone] for questions regarding this assessment.    Thank you for the consult,   Landon Stein Jr       Patient brief summary:  Keira Starkey is a 75 y.o. female initiated on antimicrobial therapy with IV Vancomycin for treatment of pneumonia     Drug Allergies:   Review of patient's allergies indicates:  No Known Allergies    Actual Body Weight:   74.8 kg    Renal Function:   Estimated Creatinine Clearance: 17.3 mL/min (A) (based on SCr of 2.5 mg/dL (H)).,     Dialysis Method (if applicable):  N/A    CBC (last 72 hours):  Recent Labs   Lab Result Units 04/11/20  0409   WBC K/uL 6.45   Hemoglobin g/dL 11.1*   Hematocrit % 38.3   Platelets K/uL 166   Gran% % 28.0*   Lymph% % 40.0   Mono% % 13.0   Eosinophil% % 2.0   Basophil% % 0.0   Differential Method  Manual       Metabolic Panel (last 72 hours):  Recent Labs   Lab Result Units 04/11/20  0409   Sodium mmol/L 145   Potassium mmol/L 4.1   Chloride mmol/L 109   CO2 mmol/L 26   Glucose mg/dL 80   BUN, Bld mg/dL 30*   Creatinine mg/dL 2.5*   Albumin g/dL 1.8*   Total Bilirubin mg/dL 0.2   Alkaline Phosphatase U/L 76   AST U/L 29   ALT U/L 24       Vancomycin Administrations:  vancomycin given in the last 96 hours                     vancomycin 500 mg in dextrose 5 % 100 mL IVPB (ready to mix system) (mg)  [FreeTextEntry1] : 54 y/o female present for Annual physical exam  500 mg New Bag 04/12/20 1438                      Microbiologic Results:  Microbiology Results (last 7 days)       Procedure Component Value Units Date/Time    Blood culture [[id number]] Collected:  04/09/20 1151    Order Status:  Completed Specimen:  Blood Updated:  04/12/20 1503     Blood Culture, Routine No Growth to date      No Growth to date      No Growth to date      No Growth to date    Blood culture [[id number]] Collected:  04/09/20 1150    Order Status:  Completed Specimen:  Blood Updated:  04/12/20 1503     Blood Culture, Routine No Growth to date      No Growth to date      No Growth to date      No Growth to date    Blood culture x two cultures. Draw prior to antibiotics. [[id number]] Collected:  04/06/20 1545    Order Status:  Completed Specimen:  Blood from Peripheral, Forearm, Left Updated:  04/10/20 2303     Blood Culture, Routine No Growth after 4 days.     Narrative:       Aerobic and anaerobic    Blood culture x two cultures. Draw prior to antibiotics. [[id number]]  (Abnormal) Collected:  04/06/20 1728    Order Status:  Completed Specimen:  Blood from Line, Femoral, Left Updated:  04/09/20 1312     Blood Culture, Routine Gram stain aer bottle: Gram positive cocci in clusters resembling Staph       Results called to and read back by: Luz Elena Flores      Gram stain balwinder bottle: Gram positive cocci in clusters resembling Staph       Positive results previously called      STAPHYLOCOCCUS COHNII SUBSPECIES UREALYTICUS  Organism is a probable contaminant      Narrative:       Aerobic and anaerobic           [de-identified] : Patient presents the office today for complete physical exam and fasting blood work\par Patient has been seeing GI as she is having issues with diarrhea\par Diarrhea has currently subsided\par Diet and exercise are fair

## 2021-08-17 NOTE — CONSULTS
Date of Admit: 4/6/2020  Length of Stay: 1   Days  Requesting consult: MD Ignacio  Date of Consult: 4/8/2020    Reason for Consultation     Acute renal failure    Subjective:      History of Present Illness:  Keira Starkey is a 75 y.o. year old female with a past medical history significant for ckd 3 (creatinine baseline 1.4), dementia, OM who presented to [facility] for concerns of ams and hypoxia with covid 19 infection. Pt exhibiting clonic jerks. Pt is not oriented. Per chart review, pt has signfiicant dementia with orientation only to name.  Pt now with hypothermia, ams, and clonic behavior. Pt has met  Acidosis with rebeca a bicarb for 15 and creatinine now 2.7. Pt's sodium is also up with 146 and a glucose of 62.      Past Medical History:  Past Medical History:   Diagnosis Date    Anemia     Chronic kidney disease     Dementia     Dysphagia     GERD (gastroesophageal reflux disease)     Hemiplegia     Hypertension     Osteoarthritis     Osteomyelitis        Past Surgical History:  Past Surgical History:   Procedure Laterality Date    DEBRIDEMENT OF FOOT Left 4/12/2019    Procedure: DEBRIDEMENT, FOOT, BONE BIOPSY, WOUND VAC PLACEMENT;  Surgeon: Warner Kramer DPM;  Location: [facility] OR;  Service: Podiatry;  Laterality: Left;       Allergies:  Review of patient's allergies indicates:  No Known Allergies    Home Medications:    No current facility-administered medications on file prior to encounter.      Current Outpatient Medications on File Prior to Encounter   Medication Sig Dispense Refill    ARIPiprazole (ABILIFY) 15 MG Tab Take 15 mg by mouth nightly.       ascorbic acid, vitamin C, (VITAMIN C) 100 MG tablet Take 500 mg by mouth once daily.       carvedilol (COREG) 6.25 MG tablet Take 6.25 mg by mouth 2 (two) times daily with meals.      donepezil (ARICEPT) 10 MG tablet Take 10 mg by mouth every evening.      escitalopram oxalate (LEXAPRO) 10 MG tablet Take 15 mg by mouth once daily.       Patient has been on tamsulosin and finasteride. Last PSA was completed in 2019 I do not see a PSA level completed in 2020. Orders provided at this time. He is usually followed per urology at [facility].  He is asymptomatic at this time. fluticasone (FLONASE) 50 mcg/actuation nasal spray 1 spray by Each Nare route once daily.      heparin sod,porcine/0.9 % NaCl (HEPARIN, PORCINE, IN 0.9% NACL) 10,000 unit/1,000 mL SolP Inject into the vein.      multivitamin capsule Take 1 capsule by mouth once daily.      protein supplement Liqd Take by mouth.      ranitidine (ZANTAC) 150 MG tablet Take 150 mg by mouth once daily.       traZODone (DESYREL) 50 MG tablet Take 25 mg by mouth every evening.      vitamin D (VITAMIN D3) 1000 units Tab Take 1,000 Units by mouth once daily.      zinc sulfate (ZINCATE) 220 (50) mg capsule Take 220 mg by mouth once daily.         Family History:  Cannot get from her  Social History:  Social History     Tobacco Use    Smoking status: Unknown If Ever Smoked   Substance Use Topics    Alcohol use: Not Currently    Drug use: Never     [place]    Review of Systems:    Unable to obtain, very altered, clonic jerks noted       Objective:     Scheduled Meds:   azithromycin  500 mg Intravenous Q24H    cefTRIAXone (ROCEPHIN) IVPB  1 g Intravenous Q24H    heparin (porcine)  5,000 Units Subcutaneous Q8H    pantoprazole  40 mg Intravenous Daily     Continuous Infusions:   lactated ringers 100 mL/hr at 04/08/20 1607     PRN Meds:.acetaminophen, hydrALAZINE, ondansetron, sodium chloride 0.9%, Pharmacy to dose Vancomycin consult **AND** vancomycin - pharmacy to dose      Physical Examination:    Vitals: /64 (BP Location: Left leg, Patient Position: Lying)   Pulse 105   Temp 98.4 °F (36.9 °C) (Axillary)   Resp 19   Ht 5' (1.524 m)   Wt 74.8 kg (164 lb 14.5 oz)   SpO2 99%   Breastfeeding? No   BMI 32.21 kg/m²    I/O last 3 completed shifts:  In: 566.7 [I.V.:266.7; IV Piggyback:300]  Out: 1050 [Urine:1050]  No intake/output data recorded.      General: elderly female frail in nad  Head: Normocephalic, atraumatic  Eyes: eyes closed  Neck: Supple  Cardiovascular: rr  Chest: no wheezes, decrease bases  Abdomen:  Soft, nondistended, nontender  Extremities: No edema of leg  Neurologic: not alert or oriented, clonic jerking motions of arms    Laboratory:  Recent Results (from the past 24 hour(s))   CBC auto differential    Collection Time: 04/08/20  5:04 AM   Result Value Ref Range    WBC 9.60 3.90 - 12.70 K/uL    RBC 4.46 4.00 - 5.40 M/uL    Hemoglobin 11.4 (L) 12.0 - 16.0 g/dL    Hematocrit 36.4 (L) 37.0 - 48.5 %    Mean Corpuscular Volume 82 82 - 98 fL    Mean Corpuscular Hemoglobin 25.6 (L) 27.0 - 31.0 pg    Mean Corpuscular Hemoglobin Conc 31.3 (L) 32.0 - 36.0 g/dL    RDW 14.8 (H) 11.5 - 14.5 %    Platelets 200 150 - 350 K/uL    MPV 9.3 9.2 - 12.9 fL    Immature Granulocytes CANCELED 0.0 - 0.5 %    Immature Grans (Abs) CANCELED 0.00 - 0.04 K/uL    Lymph # CANCELED 1.0 - 4.8 K/uL    Mono # CANCELED 0.3 - 1.0 K/uL    Eos # CANCELED 0.0 - 0.5 K/uL    Baso # CANCELED 0.00 - 0.20 K/uL    nRBC 1 (A) 0 /100 WBC    Gran% 51.0 38.0 - 73.0 %    Lymph% 35.0 18.0 - 48.0 %    Mono% 5.0 4.0 - 15.0 %    Eosinophil% 1.0 0.0 - 8.0 %    Basophil% 0.0 0.0 - 1.9 %    Metamyelocytes 2.0 %    Myelocytes 6.0 %    Differential Method Manual    Comprehensive metabolic panel    Collection Time: 04/08/20  5:04 AM   Result Value Ref Range    Sodium 146 (H) 136 - 145 mmol/L    Potassium 4.5 3.5 - 5.1 mmol/L    Chloride 118 (H) 95 - 110 mmol/L    CO2 15 (L) 23 - 29 mmol/L    Glucose 62 (L) 70 - 110 mg/dL    BUN, Bld 47 (H) 8 - 23 mg/dL    Creatinine 2.7 (H) 0.5 - 1.4 mg/dL    Calcium 8.7 8.7 - 10.5 mg/dL    Total Protein 6.6 6.0 - 8.4 g/dL    Albumin 2.1 (L) 3.5 - 5.2 g/dL    Total Bilirubin 0.2 0.1 - 1.0 mg/dL    Alkaline Phosphatase 59 55 - 135 U/L    AST 27 10 - 40 U/L    ALT 26 10 - 44 U/L    Anion Gap 13 8 - 16 mmol/L    eGFR if African American 19 (A) >60 mL/min/1.73 m^2    eGFR if non African American 17 (A) >60 mL/min/1.73 m^2   ISTAT PROCEDURE    Collection Time: 04/08/20  4:18 PM   Result Value Ref Range    POC PH 7.471 (H) 7.35 - 7.45     POC PCO2 21.7 (LL) 35 - 45 mmHg    POC PO2 60 (L) 80 - 100 mmHg    POC HCO3 15.8 (L) 24 - 28 mmol/L    POC BE -6 -2 to 2 mmol/L    POC SATURATED O2 93 (L) 95 - 100 %    POC TCO2 16 (L) 23 - 27 mmol/L    Sample ARTERIAL     Site LR     Allens Test Pass     DelSys Nasal Can     Mode SPONT     Flow 2     Sp02 90            Diagnostic Tests:     Narrative     EXAMINATION:  CT HEAD WITHOUT CONTRAST    CLINICAL HISTORY:  Confusion/delirium, altered LOC, unexplained;    TECHNIQUE:  Low dose axial images were obtained through the head.  Coronal and sagittal reformations were also performed. Contrast was not administered.    COMPARISON:  09/18/2010    FINDINGS:  There is generalized cerebral volume loss.  There is hypoattenuation in a periventricular fashion, likely sequela of chronic microvascular ischemic change.  There is no evidence of acute major vascular territory infarct, hemorrhage, or mass.  There is no hydrocephalus.  There are no abnormal extra-axial fluid collections.  The paranasal sinuses and mastoid air cells are clear, and there is no evidence of calvarial fracture.  The visualized soft tissues are unremarkable.      Impression       1. No acute intracranial abnormalities noting sequela of chronic microvascular ischemic change and senescent change.      Electronically signed by: Brigido Solis MD  Date: 04/06/2020  Time: 19:47       X-Ray Chest AP Portable   Order: [id number]   Status:  Final result   Visible to patient:  No (Not Released) Next appt:  None   Details     Reading Physician Reading Date Result Priority   Maddie Saenz MD 4/6/2020 STAT      Narrative     EXAMINATION:  XR CHEST AP PORTABLE    CLINICAL HISTORY:  hypoxia;    TECHNIQUE:  Single frontal view of the chest was performed.    COMPARISON:  April 2019    FINDINGS:  Heart size is not enlarged.  Devices external to the patient partly obscure the lower chest.  There is no pleural effusion.  Lungs are underinflated but appear clear.   Osseous structures are unremarkable.      Impression       As above      Electronically signed by: Maddie Saenz MD  Date: 04/06/2020  Time: 16:30             Last Resulted: 04/06/20 16:30 Order Details View Encounter Lab and Collection Details Routing Result History            External Result Report     External Result Report   Narrative     EXAMINATION:  XR CHEST AP PORTABLE    CLINICAL HISTORY:  hypoxia;    TECHNIQUE:  Single frontal view of the chest was performed.    COMPARISON:  April 2019    FINDINGS:  Heart size is not enlarged.  Devices external to the patient partly obscure the lower chest.  There is no pleural effusion.  Lungs are underinflated but appear clear.  Osseous structures are unremarkable.   Impression       As above      Electronically signed by: Maddie Saenz MD  Date: 04/06/2020  Time: 16:30               Assessment:     Keira Starkey is a 75 y.o. female admitted with:  rebeca on ckd 3  covid 19 infection  Hypernatremia  Metabolic acidosis  Dementia  Anemia  Proteinuria  Hypoglycemia  bacteremia         Plan:   1.start gentle ivfs.  2.consult palliative care. Pt looks poorly.  3.replete bicarb.  4.ck ddimer.  5.start some dextrose infusion. Beka glucose.  6.blood cx +. Caution with vanc.        Alanis Truong

## (undated) DEVICE — CANISTER SUCTION 2 LTR

## (undated) DEVICE — SEE MEDLINE ITEM 146313

## (undated) DEVICE — SEE MEDLINE ITEM 157117

## (undated) DEVICE — PAD PREP 50/CA

## (undated) DEVICE — SWAB CULTURETTE II DUAL

## (undated) DEVICE — SPONGE DERMACEA GAUZE 4X4

## (undated) DEVICE — SPONGE LAP 18X18 PREWASHED

## (undated) DEVICE — Device

## (undated) DEVICE — SEE MEDLINE ITEM 152622

## (undated) DEVICE — DRESSING INFOVAC SMALL BLK

## (undated) DEVICE — CANISTER INFOV.A.C WOUND 500ML

## (undated) DEVICE — SEE MEDLINE ITEM 146420

## (undated) DEVICE — SYR 10CC LUER LOCK

## (undated) DEVICE — PULSAVAC ZIMMER

## (undated) DEVICE — UNDERGLOVES BIOGEL PI SIZE 8

## (undated) DEVICE — SOL 9P NACL IRR PIC IL

## (undated) DEVICE — SOL IRR NACL .9% 3000ML

## (undated) DEVICE — COLLECTOR SPECIMEN ANAEROBIC

## (undated) DEVICE — SEE MEDLINE ITEM 152515

## (undated) DEVICE — BLANKET UPPER BODY 78.7X29.9IN

## (undated) DEVICE — PACK ARTHROSCOPY W/ISO BAC

## (undated) DEVICE — ELECTRODE REM PLYHSV RETURN 9

## (undated) DEVICE — SEE MEDLINE ITEM 154981